# Patient Record
Sex: FEMALE | Race: BLACK OR AFRICAN AMERICAN | NOT HISPANIC OR LATINO | Employment: PART TIME | URBAN - METROPOLITAN AREA
[De-identification: names, ages, dates, MRNs, and addresses within clinical notes are randomized per-mention and may not be internally consistent; named-entity substitution may affect disease eponyms.]

---

## 2020-03-25 ENCOUNTER — HOSPITAL ENCOUNTER (EMERGENCY)
Facility: HOSPITAL | Age: 35
Discharge: HOME | End: 2020-03-26
Attending: EMERGENCY MEDICINE
Payer: MEDICAID

## 2020-03-25 DIAGNOSIS — R06.02 SHORTNESS OF BREATH: ICD-10-CM

## 2020-03-25 DIAGNOSIS — J40 BRONCHITIS: Primary | ICD-10-CM

## 2020-03-25 LAB
B-HCG UR QL: NEGATIVE
CTP QC/QA: YES

## 2020-03-25 PROCEDURE — 87502 INFLUENZA DNA AMP PROBE: CPT

## 2020-03-25 PROCEDURE — 81025 URINE PREGNANCY TEST: CPT | Performed by: EMERGENCY MEDICINE

## 2020-03-25 PROCEDURE — U0002 COVID-19 LAB TEST NON-CDC: HCPCS

## 2020-03-25 PROCEDURE — 93005 ELECTROCARDIOGRAM TRACING: CPT

## 2020-03-25 PROCEDURE — 99283 EMERGENCY DEPT VISIT LOW MDM: CPT | Mod: 25

## 2020-03-26 VITALS
HEIGHT: 69 IN | OXYGEN SATURATION: 100 % | WEIGHT: 240 LBS | RESPIRATION RATE: 20 BRPM | DIASTOLIC BLOOD PRESSURE: 72 MMHG | HEART RATE: 107 BPM | BODY MASS INDEX: 35.55 KG/M2 | SYSTOLIC BLOOD PRESSURE: 123 MMHG | TEMPERATURE: 99 F

## 2020-03-26 LAB
ALBUMIN SERPL BCP-MCNC: 3.6 G/DL (ref 3.5–5.2)
ALP SERPL-CCNC: 69 U/L (ref 55–135)
ALT SERPL W/O P-5'-P-CCNC: 18 U/L (ref 10–44)
ANION GAP SERPL CALC-SCNC: 13 MMOL/L (ref 8–16)
AST SERPL-CCNC: 20 U/L (ref 10–40)
BASOPHILS # BLD AUTO: 0.04 K/UL (ref 0–0.2)
BASOPHILS NFR BLD: 1 % (ref 0–1.9)
BILIRUB SERPL-MCNC: 0.3 MG/DL (ref 0.1–1)
BNP SERPL-MCNC: <10 PG/ML (ref 0–99)
BUN SERPL-MCNC: 6 MG/DL (ref 6–20)
CALCIUM SERPL-MCNC: 9.1 MG/DL (ref 8.7–10.5)
CHLORIDE SERPL-SCNC: 107 MMOL/L (ref 95–110)
CO2 SERPL-SCNC: 20 MMOL/L (ref 23–29)
CREAT SERPL-MCNC: 0.8 MG/DL (ref 0.5–1.4)
CTP QC/QA: YES
D DIMER PPP IA.FEU-MCNC: 0.31 MG/L FEU
DIFFERENTIAL METHOD: ABNORMAL
EOSINOPHIL # BLD AUTO: 0.2 K/UL (ref 0–0.5)
EOSINOPHIL NFR BLD: 3.8 % (ref 0–8)
ERYTHROCYTE [DISTWIDTH] IN BLOOD BY AUTOMATED COUNT: 13.4 % (ref 11.5–14.5)
EST. GFR  (AFRICAN AMERICAN): >60 ML/MIN/1.73 M^2
EST. GFR  (NON AFRICAN AMERICAN): >60 ML/MIN/1.73 M^2
GLUCOSE SERPL-MCNC: 98 MG/DL (ref 70–110)
HCT VFR BLD AUTO: 42.1 % (ref 37–48.5)
HGB BLD-MCNC: 13.5 G/DL (ref 12–16)
IMM GRANULOCYTES # BLD AUTO: 0.01 K/UL (ref 0–0.04)
IMM GRANULOCYTES NFR BLD AUTO: 0.2 % (ref 0–0.5)
LYMPHOCYTES # BLD AUTO: 2 K/UL (ref 1–4.8)
LYMPHOCYTES NFR BLD: 47.6 % (ref 18–48)
MCH RBC QN AUTO: 24.6 PG (ref 27–31)
MCHC RBC AUTO-ENTMCNC: 32.1 G/DL (ref 32–36)
MCV RBC AUTO: 77 FL (ref 82–98)
MONOCYTES # BLD AUTO: 0.5 K/UL (ref 0.3–1)
MONOCYTES NFR BLD: 12.7 % (ref 4–15)
NEUTROPHILS # BLD AUTO: 1.4 K/UL (ref 1.8–7.7)
NEUTROPHILS NFR BLD: 34.7 % (ref 38–73)
NRBC BLD-RTO: 0 /100 WBC
PLATELET # BLD AUTO: 228 K/UL (ref 150–350)
PMV BLD AUTO: 10.1 FL (ref 9.2–12.9)
POC MOLECULAR INFLUENZA A AGN: NEGATIVE
POC MOLECULAR INFLUENZA B AGN: NEGATIVE
POTASSIUM SERPL-SCNC: 4.2 MMOL/L (ref 3.5–5.1)
PROT SERPL-MCNC: 7.2 G/DL (ref 6–8.4)
RBC # BLD AUTO: 5.49 M/UL (ref 4–5.4)
SODIUM SERPL-SCNC: 140 MMOL/L (ref 136–145)
TROPONIN I SERPL DL<=0.01 NG/ML-MCNC: <0.006 NG/ML (ref 0–0.03)
WBC # BLD AUTO: 4.16 K/UL (ref 3.9–12.7)

## 2020-03-26 PROCEDURE — 94761 N-INVAS EAR/PLS OXIMETRY MLT: CPT

## 2020-03-26 PROCEDURE — 25000242 PHARM REV CODE 250 ALT 637 W/ HCPCS: Performed by: EMERGENCY MEDICINE

## 2020-03-26 PROCEDURE — 85379 FIBRIN DEGRADATION QUANT: CPT

## 2020-03-26 PROCEDURE — 94640 AIRWAY INHALATION TREATMENT: CPT

## 2020-03-26 PROCEDURE — 80053 COMPREHEN METABOLIC PANEL: CPT

## 2020-03-26 PROCEDURE — 83880 ASSAY OF NATRIURETIC PEPTIDE: CPT

## 2020-03-26 PROCEDURE — 85025 COMPLETE CBC W/AUTO DIFF WBC: CPT

## 2020-03-26 PROCEDURE — 84484 ASSAY OF TROPONIN QUANT: CPT

## 2020-03-26 RX ORDER — ALBUTEROL SULFATE 90 UG/1
1-2 AEROSOL, METERED RESPIRATORY (INHALATION) EVERY 6 HOURS PRN
Qty: 8 G | Refills: 0 | Status: SHIPPED | OUTPATIENT
Start: 2020-03-26 | End: 2020-12-03

## 2020-03-26 RX ORDER — IPRATROPIUM BROMIDE AND ALBUTEROL SULFATE 2.5; .5 MG/3ML; MG/3ML
3 SOLUTION RESPIRATORY (INHALATION)
Status: COMPLETED | OUTPATIENT
Start: 2020-03-26 | End: 2020-03-26

## 2020-03-26 RX ADMIN — IPRATROPIUM BROMIDE AND ALBUTEROL SULFATE 3 ML: .5; 3 SOLUTION RESPIRATORY (INHALATION) at 12:03

## 2020-03-26 NOTE — ED TRIAGE NOTES
Pt presents to ED via POV with c/o shortness of breath and cough, both which started yesterday. Pt reports a productive cough with green sputum. She also c/o midsternal chest tightness with the cough. Denies nausea but had an episode of emesis with her cough earlier today. She has been drinking hot tea with lemon and peppermint without relief in symptoms as well as taking tylenol. Denies any sick contacts. Denies fevers. Denies respiratory history but reports hx of CHF though is not currently on diuretics. Pt breathless during HPI and intake.

## 2020-03-26 NOTE — ED NOTES
Pt states she feels much better after breathing treatment. Pt now speaking in full sentences without difficulty or shortness of breath. Respirations even and unlabored.

## 2020-03-26 NOTE — ED PROVIDER NOTES
Encounter Date: 3/25/2020       History     Chief Complaint   Patient presents with    Shortness of Breath     Pt here with reports of SOB, cough and wheezing since yesterday, denies fever. Pt only able to talk in short sentences, unable to ambulate.    Cough     35-year-old female presents with cough, shortness of breath.  She states her symptoms started yesterday.  She noticed some wheezing.  She states she is becoming short of breath with going up stairs.  She has not had a fever, myalgias.  She states her cough was productive of light colored sputum.  She denies any lower extremity edema.  She denies previous history of DVT or PE.  She does report history of congestive heart failure several years ago, she is unsure why she was diagnosed with this.  She states she has not been on any diuretics for the past several years.  She has no other complaints.        Review of patient's allergies indicates:  No Known Allergies  History reviewed. No pertinent past medical history.  Past Surgical History:   Procedure Laterality Date     SECTION, CLASSIC      CHOLECYSTECTOMY      TUBAL LIGATION       History reviewed. No pertinent family history.  Social History     Tobacco Use    Smoking status: Never Smoker   Substance Use Topics    Alcohol use: Yes    Drug use: No     Review of Systems   Constitutional: Negative for chills and fever.   HENT: Negative for congestion and sore throat.    Eyes: Negative for visual disturbance.   Respiratory: Positive for cough and shortness of breath.    Cardiovascular: Negative for chest pain and leg swelling.   Gastrointestinal: Negative for abdominal pain, nausea and vomiting.   Genitourinary: Negative for dysuria and vaginal discharge.   Skin: Negative for rash.   Neurological: Negative for headaches.   Psychiatric/Behavioral: Negative for decreased concentration.       Physical Exam     Initial Vitals [20 2314]   BP Pulse Resp Temp SpO2   (!) 132/90 96 18 98.6 °F (37  °C) 98 %      MAP       --         Physical Exam    Nursing note and vitals reviewed.  Constitutional: She appears well-developed and well-nourished. She is not diaphoretic. No distress.   HENT:   Mouth/Throat: Oropharynx is clear and moist.   Eyes: Pupils are equal, round, and reactive to light.   Neck: Neck supple.   Cardiovascular: Normal rate and regular rhythm.   Pulmonary/Chest: Breath sounds normal. No respiratory distress. She has no wheezes. She has no rhonchi. She has no rales.   Patient does have slight tachypnea after giving her history.   Abdominal: Soft. There is no tenderness.   Musculoskeletal: She exhibits no edema.   No lower extremity edema, no posterior calf tenderness.   Neurological: She is alert and oriented to person, place, and time.   Skin: Skin is warm and dry.   Psychiatric: She has a normal mood and affect.         ED Course   Procedures  Labs Reviewed   CBC W/ AUTO DIFFERENTIAL - Abnormal; Notable for the following components:       Result Value    RBC 5.49 (*)     Mean Corpuscular Volume 77 (*)     Mean Corpuscular Hemoglobin 24.6 (*)     Gran # (ANC) 1.4 (*)     Gran% 34.7 (*)     All other components within normal limits   COMPREHENSIVE METABOLIC PANEL - Abnormal; Notable for the following components:    CO2 20 (*)     All other components within normal limits   B-TYPE NATRIURETIC PEPTIDE   D DIMER, QUANTITATIVE   TROPONIN I   SARS-COV-2 (COVID-19) QUALITATIVE PCR   POCT URINE PREGNANCY   POCT INFLUENZA A/B MOLECULAR     EKG Readings: (Independently Interpreted)   Normal sinus rhythm, rate 98 beats per minute, normal OK interval,  milliseconds.  No STEMI, no malignant dysrhythmia       Imaging Results          X-Ray Chest AP Portable (Final result)  Result time 03/26/20 00:19:36    Final result by Landon Hanna MD (03/26/20 00:19:36)                 Impression:      No acute cardiopulmonary process identified.      Electronically signed by: Landon Hanna  MD  Date:    03/26/2020  Time:    00:19             Narrative:    EXAMINATION:  XR CHEST AP PORTABLE    CLINICAL HISTORY:  shortness of breath;    TECHNIQUE:  Single frontal view of the chest was performed.    COMPARISON:  June 2016.    FINDINGS:  Cardiac silhouette is normal in size.  Lungs are symmetrically expanded.  No evidence of focal consolidative process, pneumothorax, or significant effusion.  No acute osseous abnormality identified.                                 Medical Decision Making:   Initial Assessment:   35-year-old female presenting with shortness of breath, cough.  On exam, she does have slight tachypnea when giving history, but otherwise appears well and not in distress.  I do not appreciate any wheezing or rales.  Given her history congestive heart failure, differential includes CHF exacerbation, other considerations PE, ACS, pneumonia, bronchitis.  Workup initiated with basic labs including cardiac markers, EKG, chest x-ray.  Although I do not hear any wheezing on her exam, will try DuoNeb to see if she has any symptomatic improvement.  ED Management:  Patient's labs reviewed, cardiac markers are negative, D-dimer negative.  No anemia.  No leukocytosis.  Her chest x-ray is nonfocal.  On reassessment, she reports feeling improved after DuoNeb.  I suspect bronchitis.  She is afebrile.  I will prescribe an albuterol inhaler to use if needed over the next several days, I reviewed strict return precautions including any worsening breathing or development of new or worsening symptoms.  Patient states she understands and agrees with plan.    Additional MDM:   PERC Rule:   Age is greater than or equal to 50 = 0.0  Heart Rate is greater than or equal to 100 = 0.0  SaO2 on room air < 95% = 0.0  Unilateral leg swelling = 0.0  Hemoptysis = 0.0  Recent surgery or trauma = 0.0  Prior PE or DVT =  0.0  Hormone use = 0.00  PERC Score = 0    Well's Criteria Score:  -Clinical symptoms of DVT (leg swelling, pain  with palpation) = 0.0  -Other diagnosis less likely than pulmonary embolism =            3.0  -Heart Rate >100 =   0.0  -Immobilization (= or > than 3 days) or surgery in the previous 4 weeks = 0.0  -Previous DVT/PE = 0.0  -Hemoptysis =          0.0  -Malignancy =           0.0  Well's Probability Score =    3                                    Clinical Impression:       ICD-10-CM ICD-9-CM   1. Bronchitis J40 490   2. Shortness of breath R06.02 786.05     This dictation has been generated using M-Modal Fluency Direct dictation; some phonetic errors may occur.           ED Disposition Condition    Discharge Stable        ED Prescriptions     Medication Sig Dispense Start Date End Date Auth. Provider    albuterol (PROVENTIL/VENTOLIN HFA) 90 mcg/actuation inhaler Inhale 1-2 puffs into the lungs every 6 (six) hours as needed for Wheezing or Shortness of Breath. Rescue 8 g 3/26/2020 3/26/2021 Annabelle Edwards MD        Follow-up Information     Follow up With Specialties Details Why Contact Info    Harsh Palmer MD Internal Medicine, Family Medicine Schedule an appointment as soon as possible for a visit on 3/30/2020 To recheck your symptoms (for virtual or phone visit if possible) [address]  [address]  [place] [address]  [phone]      [facility]  As needed, If symptoms worsen [address]  [facility] [address] [phone]                                     Annabelle Edwards MD  03/26/20 0100

## 2020-03-27 LAB — SARS-COV-2 RNA RESP QL NAA+PROBE: NOT DETECTED

## 2020-09-09 ENCOUNTER — HOSPITAL ENCOUNTER (EMERGENCY)
Facility: HOSPITAL | Age: 35
Discharge: HOME | End: 2020-09-09
Attending: EMERGENCY MEDICINE
Payer: MEDICAID

## 2020-09-09 VITALS
BODY MASS INDEX: 37.03 KG/M2 | HEART RATE: 89 BPM | OXYGEN SATURATION: 100 % | TEMPERATURE: 98 F | WEIGHT: 250 LBS | DIASTOLIC BLOOD PRESSURE: 71 MMHG | HEIGHT: 69 IN | RESPIRATION RATE: 18 BRPM | SYSTOLIC BLOOD PRESSURE: 128 MMHG

## 2020-09-09 DIAGNOSIS — R60.0 PERIPHERAL EDEMA: ICD-10-CM

## 2020-09-09 DIAGNOSIS — R07.9 CHEST PAIN: ICD-10-CM

## 2020-09-09 DIAGNOSIS — K21.9 GASTROESOPHAGEAL REFLUX DISEASE, ESOPHAGITIS PRESENCE NOT SPECIFIED: Primary | ICD-10-CM

## 2020-09-09 LAB
ALBUMIN SERPL BCP-MCNC: 3.8 G/DL (ref 3.5–5.2)
ALP SERPL-CCNC: 63 U/L (ref 55–135)
ALT SERPL W/O P-5'-P-CCNC: 16 U/L (ref 10–44)
ANION GAP SERPL CALC-SCNC: 11 MMOL/L (ref 8–16)
AST SERPL-CCNC: 16 U/L (ref 10–40)
B-HCG UR QL: NEGATIVE
BACTERIA #/AREA URNS HPF: ABNORMAL /HPF
BASOPHILS # BLD AUTO: 0.05 K/UL (ref 0–0.2)
BASOPHILS NFR BLD: 0.8 % (ref 0–1.9)
BILIRUB SERPL-MCNC: 0.3 MG/DL (ref 0.1–1)
BILIRUB UR QL STRIP: NEGATIVE
BNP SERPL-MCNC: 20 PG/ML (ref 0–99)
BUN SERPL-MCNC: 11 MG/DL (ref 6–20)
CALCIUM SERPL-MCNC: 9.3 MG/DL (ref 8.7–10.5)
CHLORIDE SERPL-SCNC: 105 MMOL/L (ref 95–110)
CLARITY UR: CLEAR
CO2 SERPL-SCNC: 23 MMOL/L (ref 23–29)
COLOR UR: ABNORMAL
CREAT SERPL-MCNC: 0.9 MG/DL (ref 0.5–1.4)
CTP QC/QA: YES
DIFFERENTIAL METHOD: ABNORMAL
EOSINOPHIL # BLD AUTO: 0.2 K/UL (ref 0–0.5)
EOSINOPHIL NFR BLD: 2.9 % (ref 0–8)
ERYTHROCYTE [DISTWIDTH] IN BLOOD BY AUTOMATED COUNT: 13.5 % (ref 11.5–14.5)
EST. GFR  (AFRICAN AMERICAN): >60 ML/MIN/1.73 M^2
EST. GFR  (NON AFRICAN AMERICAN): >60 ML/MIN/1.73 M^2
GLUCOSE SERPL-MCNC: 71 MG/DL (ref 70–110)
GLUCOSE UR QL STRIP: NEGATIVE
HCT VFR BLD AUTO: 38.5 % (ref 37–48.5)
HGB BLD-MCNC: 12.6 G/DL (ref 12–16)
HGB UR QL STRIP: ABNORMAL
IMM GRANULOCYTES # BLD AUTO: 0.01 K/UL (ref 0–0.04)
IMM GRANULOCYTES NFR BLD AUTO: 0.2 % (ref 0–0.5)
KETONES UR QL STRIP: NEGATIVE
LEUKOCYTE ESTERASE UR QL STRIP: NEGATIVE
LYMPHOCYTES # BLD AUTO: 2.8 K/UL (ref 1–4.8)
LYMPHOCYTES NFR BLD: 43.2 % (ref 18–48)
MAGNESIUM SERPL-MCNC: 2.2 MG/DL (ref 1.6–2.6)
MCH RBC QN AUTO: 25 PG (ref 27–31)
MCHC RBC AUTO-ENTMCNC: 32.7 G/DL (ref 32–36)
MCV RBC AUTO: 76 FL (ref 82–98)
MICROSCOPIC COMMENT: ABNORMAL
MONOCYTES # BLD AUTO: 0.5 K/UL (ref 0.3–1)
MONOCYTES NFR BLD: 7.4 % (ref 4–15)
NEUTROPHILS # BLD AUTO: 2.9 K/UL (ref 1.8–7.7)
NEUTROPHILS NFR BLD: 45.5 % (ref 38–73)
NITRITE UR QL STRIP: NEGATIVE
NRBC BLD-RTO: 0 /100 WBC
PH UR STRIP: 7 [PH] (ref 5–8)
PLATELET # BLD AUTO: 278 K/UL (ref 150–350)
PMV BLD AUTO: 10.2 FL (ref 9.2–12.9)
POTASSIUM SERPL-SCNC: 3.8 MMOL/L (ref 3.5–5.1)
PROT SERPL-MCNC: 7.2 G/DL (ref 6–8.4)
PROT UR QL STRIP: NEGATIVE
RBC # BLD AUTO: 5.04 M/UL (ref 4–5.4)
RBC #/AREA URNS HPF: 2 /HPF (ref 0–4)
SODIUM SERPL-SCNC: 139 MMOL/L (ref 136–145)
SP GR UR STRIP: 1 (ref 1–1.03)
SQUAMOUS #/AREA URNS HPF: 1 /HPF
TROPONIN I SERPL DL<=0.01 NG/ML-MCNC: <0.006 NG/ML (ref 0–0.03)
TSH SERPL DL<=0.005 MIU/L-ACNC: 0.83 UIU/ML (ref 0.4–4)
URN SPEC COLLECT METH UR: ABNORMAL
UROBILINOGEN UR STRIP-ACNC: NEGATIVE EU/DL
WBC # BLD AUTO: 6.46 K/UL (ref 3.9–12.7)

## 2020-09-09 PROCEDURE — 81025 URINE PREGNANCY TEST: CPT | Performed by: EMERGENCY MEDICINE

## 2020-09-09 PROCEDURE — 93010 EKG 12-LEAD: ICD-10-PCS | Mod: ,,, | Performed by: INTERNAL MEDICINE

## 2020-09-09 PROCEDURE — 83735 ASSAY OF MAGNESIUM: CPT

## 2020-09-09 PROCEDURE — 84484 ASSAY OF TROPONIN QUANT: CPT

## 2020-09-09 PROCEDURE — 99284 EMERGENCY DEPT VISIT MOD MDM: CPT | Mod: 25

## 2020-09-09 PROCEDURE — 93005 ELECTROCARDIOGRAM TRACING: CPT

## 2020-09-09 PROCEDURE — 83880 ASSAY OF NATRIURETIC PEPTIDE: CPT

## 2020-09-09 PROCEDURE — 80053 COMPREHEN METABOLIC PANEL: CPT

## 2020-09-09 PROCEDURE — 25000003 PHARM REV CODE 250: Performed by: EMERGENCY MEDICINE

## 2020-09-09 PROCEDURE — 85025 COMPLETE CBC W/AUTO DIFF WBC: CPT

## 2020-09-09 PROCEDURE — 93010 ELECTROCARDIOGRAM REPORT: CPT | Mod: ,,, | Performed by: INTERNAL MEDICINE

## 2020-09-09 PROCEDURE — 84443 ASSAY THYROID STIM HORMONE: CPT

## 2020-09-09 PROCEDURE — 81000 URINALYSIS NONAUTO W/SCOPE: CPT

## 2020-09-09 RX ORDER — HYDROCHLOROTHIAZIDE 25 MG/1
12.5 TABLET ORAL DAILY
Qty: 30 TABLET | Refills: 0 | Status: SHIPPED | OUTPATIENT
Start: 2020-09-09 | End: 2021-07-19

## 2020-09-09 RX ORDER — HYDROCHLOROTHIAZIDE 25 MG/1
25 TABLET ORAL
Status: COMPLETED | OUTPATIENT
Start: 2020-09-09 | End: 2020-09-09

## 2020-09-09 RX ADMIN — HYDROCHLOROTHIAZIDE 25 MG: 25 TABLET ORAL at 10:09

## 2020-09-09 RX ADMIN — LIDOCAINE HYDROCHLORIDE: 20 SOLUTION ORAL; TOPICAL at 10:09

## 2020-09-10 NOTE — ED PROVIDER NOTES
"Encounter Date: 2020    SCRIBE #1 NOTE: I, Dreauche Cheney, am scribing for, and in the presence of,  Jairo Gallego MD. I have scribed the following portions of the note - Other sections scribed: HPI, ROS, PE.       History     Chief Complaint   Patient presents with    Shortness of Breath     when lying flat, w/ swelling to the ankles     CC: Orthopnea; Chest pain    Patient is a 35 year old female who presents to the ED complaining of orthopnea that began 5 days ago. She reports associated swelling to bilateral ankles and feet since onset. She states she consumed water without improvement.  Admits to high salt diet.  Patient was previosuly taking diuretics. She is no longer taking this medication. Patient reports sleeping with her feet elevated above her hips, however, her swelling worsens throughout the night. She admits to consuming BBQ foods 4 days ago after swelling began. Patient also complains of constant, lower sternal pain that feels like a "pulling". Chest discomfort began 4 days ago. Pain worsens with eating. She admits to taking Motrin, Tylenol, Aleve without relief. Reports cough with clear phlegm. No rhinorrhea. No fever or chills. No known sick contact. Denies emesis, diarrhea, and any other myalgias. No other associated symptoms.  She has been evaluated for similar complaints in the past.  She had a normal echocardiogram in 2018.      The history is provided by the patient.     Review of patient's allergies indicates:  No Known Allergies  History reviewed. No pertinent past medical history.  Past Surgical History:   Procedure Laterality Date     SECTION, CLASSIC      CHOLECYSTECTOMY      TUBAL LIGATION       History reviewed. No pertinent family history.  Social History     Tobacco Use    Smoking status: Never Smoker   Substance Use Topics    Alcohol use: Yes    Drug use: No     Review of Systems   Constitutional: Negative for chills, diaphoresis and fever.   HENT: Negative for " congestion and sore throat.    Respiratory: Positive for cough and shortness of breath (orthopnea). Negative for wheezing and stridor.    Cardiovascular: Positive for chest pain and leg swelling.   Gastrointestinal: Negative for abdominal pain, diarrhea, nausea and vomiting.        No melena.   Genitourinary: Negative for dysuria, flank pain and hematuria.   Musculoskeletal: Positive for joint swelling. Negative for myalgias.   Skin: Negative for rash.   Neurological: Positive for headaches. Negative for dizziness, speech difficulty, weakness, light-headedness and numbness.   Psychiatric/Behavioral: Negative for confusion.   All other systems reviewed and are negative.      Physical Exam     Initial Vitals [09/09/20 2020]   BP Pulse Resp Temp SpO2   136/84 91 16 98.2 °F (36.8 °C) 99 %      MAP       --         Physical Exam    Nursing note and vitals reviewed.  Constitutional: She appears well-developed and well-nourished. She is not diaphoretic. No distress.   HENT:   Head: Normocephalic and atraumatic.   Nose: Nose normal.   Mouth/Throat: Oropharynx is clear and moist.   Eyes: Conjunctivae and EOM are normal. Pupils are equal, round, and reactive to light. Right eye exhibits no discharge. Left eye exhibits no discharge. No scleral icterus.   Neck: Normal range of motion. Neck supple. No tracheal deviation present. No JVD present.   Cardiovascular: Normal rate, regular rhythm and normal heart sounds.   No murmur heard.  Pulmonary/Chest: Breath sounds normal. No stridor. No respiratory distress. She has no wheezes. She has no rhonchi. She has no rales. She exhibits no tenderness.   Abdominal: Soft. She exhibits no distension. There is no abdominal tenderness. There is no rebound and no guarding.   Musculoskeletal: Normal range of motion. Edema present. No tenderness.      Comments: 1+ non-pitting of bilateral feet and ankles.   Neurological: She is alert and oriented to person, place, and time. She has normal  strength. No cranial nerve deficit. GCS score is 15. GCS eye subscore is 4. GCS verbal subscore is 5. GCS motor subscore is 6.   Skin: Skin is warm and dry. No rash noted. No erythema.   Psychiatric: She has a normal mood and affect. Her behavior is normal. Judgment and thought content normal.         ED Course   Procedures  Labs Reviewed   CBC W/ AUTO DIFFERENTIAL - Abnormal; Notable for the following components:       Result Value    Mean Corpuscular Volume 76 (*)     Mean Corpuscular Hemoglobin 25.0 (*)     All other components within normal limits   COMPREHENSIVE METABOLIC PANEL   MAGNESIUM   URINALYSIS, REFLEX TO URINE CULTURE   TSH   B-TYPE NATRIURETIC PEPTIDE   TROPONIN I   URINALYSIS MICROSCOPIC   POCT URINE PREGNANCY     EKG Readings: (Independently Interpreted)   Initial Reading: No STEMI. Rhythm: Normal Sinus Rhythm. Heart Rate: 90. Ectopy: No Ectopy. Conduction: Normal. ST Segments: Non-Specific ST Segment Depression. T Waves: Normal. Axis: Normal.   No acute ischemic changes.       Imaging Results    None          Medical Decision Making:   History:   Old Medical Records: I decided to obtain old medical records.  Initial Assessment:   Patient presents for evaluation of peripheral edema and orthopnea.  She also complains of lower left chest discomfort associated with eating.  Normal pulmonary exam.  Benign abdominal exam.  EKG does not show ischemic changes.  Blood pressure is not markedly elevated.  Oxygen saturations normal.  Patient respiratory distress.  Will check basic lab work with BNP and urinalysis.  Suspect benign etiology.  Differential Diagnosis:   Peripheral edema, venous insufficiency, GERD, CHF, renal failure  Independently Interpreted Test(s):   I have ordered and independently interpreted EKG Reading(s) - see prior notes  Clinical Tests:   Lab Tests: Ordered and Reviewed  The following lab test(s) were unremarkable: CBC, CMP, Urinalysis, UPT, Troponin and BNP  Medical Tests: Ordered and  Reviewed  ED Management:  2210:  No significant lab abnormalities.  Chest x-ray unremarkable.  EKG unremarkable.  No evidence of ACS.  Will place on diuretic for benign peripheral edema.  No evidence of renal failure or CHF..  Patient may follow up with primary care as needed.  Patient may take over-the-counter antacids for GERD symptoms.            Scribe Attestation:   Scribe #1: I performed the above scribed service and the documentation accurately describes the services I performed. I attest to the accuracy of the note.                      Clinical Impression:       ICD-10-CM ICD-9-CM   1. Gastroesophageal reflux disease, esophagitis presence not specified  K21.9 530.81   2. Chest pain  R07.9 786.50   3. Peripheral edema  R60.9 782.3         Disposition:   Disposition: Discharged  Condition: Stable               I, Jairo Gallego MD, personally performed the services described in this documentation. All medical record entries made by the scribe were at my direction and in my presence.  I have reviewed the chart and agree that the record reflects my personal performance and is accurate and complete.             Jairo Gallego MD  09/09/20 2211

## 2020-09-10 NOTE — ED NOTES
No s/s of distress. Pain at a 0 after taking meds. VSS. IV D/C'd without difficulty. Family at bedside. Verbalizes understanding of meds/instructions/RX.

## 2020-09-10 NOTE — ED NOTES
In POV. A/O X4. Ambulatory to room. VSS. No S/S of distress.     C/O emanuel ankle swelling with SOB when lying flat for 3 days. Pt notes having to lay on 6 pillows at night to sleep. Admits to some substernal Chest pain for 3 days. Denies N/V.     Hooked to monitor. VSS. Will continue to closely monitor.

## 2020-12-03 ENCOUNTER — HOSPITAL ENCOUNTER (EMERGENCY)
Facility: HOSPITAL | Age: 35
Discharge: HOME | End: 2020-12-03
Attending: EMERGENCY MEDICINE
Payer: MEDICAID

## 2020-12-03 VITALS
WEIGHT: 250 LBS | DIASTOLIC BLOOD PRESSURE: 65 MMHG | HEART RATE: 89 BPM | BODY MASS INDEX: 37.03 KG/M2 | HEIGHT: 69 IN | TEMPERATURE: 99 F | OXYGEN SATURATION: 98 % | SYSTOLIC BLOOD PRESSURE: 116 MMHG | RESPIRATION RATE: 18 BRPM

## 2020-12-03 DIAGNOSIS — M54.50 ACUTE BILATERAL LOW BACK PAIN WITHOUT SCIATICA: ICD-10-CM

## 2020-12-03 DIAGNOSIS — R42 DIZZINESS: ICD-10-CM

## 2020-12-03 DIAGNOSIS — U07.1 COVID-19 VIRUS INFECTION: Primary | ICD-10-CM

## 2020-12-03 DIAGNOSIS — R51.9 ACUTE NONINTRACTABLE HEADACHE, UNSPECIFIED HEADACHE TYPE: ICD-10-CM

## 2020-12-03 DIAGNOSIS — R05.9 COUGH: ICD-10-CM

## 2020-12-03 LAB
B-HCG UR QL: NEGATIVE
BILIRUB UR QL STRIP: NEGATIVE
CLARITY UR: ABNORMAL
COLOR UR: YELLOW
CTP QC/QA: YES
CTP QC/QA: YES
GLUCOSE UR QL STRIP: NEGATIVE
HGB UR QL STRIP: NEGATIVE
KETONES UR QL STRIP: NEGATIVE
LEUKOCYTE ESTERASE UR QL STRIP: NEGATIVE
MICROSCOPIC COMMENT: NORMAL
NITRITE UR QL STRIP: NEGATIVE
PH UR STRIP: 7 [PH] (ref 5–8)
PROT UR QL STRIP: NEGATIVE
RBC #/AREA URNS HPF: 1 /HPF (ref 0–4)
SARS-COV-2 RDRP RESP QL NAA+PROBE: POSITIVE
SP GR UR STRIP: 1.02 (ref 1–1.03)
SQUAMOUS #/AREA URNS HPF: NORMAL /HPF
URN SPEC COLLECT METH UR: ABNORMAL
UROBILINOGEN UR STRIP-ACNC: ABNORMAL EU/DL
WBC #/AREA URNS HPF: 1 /HPF (ref 0–5)

## 2020-12-03 PROCEDURE — 96372 THER/PROPH/DIAG INJ SC/IM: CPT

## 2020-12-03 PROCEDURE — 25000003 PHARM REV CODE 250: Performed by: PHYSICIAN ASSISTANT

## 2020-12-03 PROCEDURE — 81025 URINE PREGNANCY TEST: CPT | Performed by: PHYSICIAN ASSISTANT

## 2020-12-03 PROCEDURE — 99284 EMERGENCY DEPT VISIT MOD MDM: CPT | Mod: 25

## 2020-12-03 PROCEDURE — 63600175 PHARM REV CODE 636 W HCPCS: Performed by: PHYSICIAN ASSISTANT

## 2020-12-03 PROCEDURE — U0002 COVID-19 LAB TEST NON-CDC: HCPCS | Performed by: PHYSICIAN ASSISTANT

## 2020-12-03 PROCEDURE — 81000 URINALYSIS NONAUTO W/SCOPE: CPT

## 2020-12-03 RX ORDER — L. ACIDOPHILUS/L.BULGARICUS 100MM CELL
1 GRANULES IN PACKET (EA) ORAL 2 TIMES DAILY
COMMUNITY
End: 2021-07-19

## 2020-12-03 RX ORDER — CETIRIZINE HYDROCHLORIDE 10 MG/1
10 TABLET ORAL DAILY
Qty: 30 TABLET | Refills: 0 | Status: SHIPPED | OUTPATIENT
Start: 2020-12-03 | End: 2022-04-19

## 2020-12-03 RX ORDER — LIDOCAINE 50 MG/G
1 PATCH TOPICAL DAILY
Qty: 15 PATCH | Refills: 0 | Status: ON HOLD | OUTPATIENT
Start: 2020-12-03 | End: 2020-12-16 | Stop reason: SDUPTHER

## 2020-12-03 RX ORDER — ITRACONAZOLE 100 MG/1
200 CAPSULE ORAL DAILY
COMMUNITY
End: 2021-07-19

## 2020-12-03 RX ORDER — CHOLECALCIFEROL (VITAMIN D3) 25 MCG
1000 TABLET ORAL DAILY
COMMUNITY
End: 2021-07-19

## 2020-12-03 RX ORDER — BENZONATATE 100 MG/1
100 CAPSULE ORAL 3 TIMES DAILY PRN
Qty: 20 CAPSULE | Refills: 0 | Status: ON HOLD | OUTPATIENT
Start: 2020-12-03 | End: 2020-12-16

## 2020-12-03 RX ORDER — VITAMIN A 3000 MCG
10000 CAPSULE ORAL DAILY
Status: ON HOLD | COMMUNITY
End: 2020-12-16 | Stop reason: HOSPADM

## 2020-12-03 RX ORDER — KETOROLAC TROMETHAMINE 30 MG/ML
30 INJECTION, SOLUTION INTRAMUSCULAR; INTRAVENOUS
Status: COMPLETED | OUTPATIENT
Start: 2020-12-03 | End: 2020-12-03

## 2020-12-03 RX ORDER — DEXAMETHASONE SODIUM PHOSPHATE 4 MG/ML
8 INJECTION, SOLUTION INTRA-ARTICULAR; INTRALESIONAL; INTRAMUSCULAR; INTRAVENOUS; SOFT TISSUE
Status: COMPLETED | OUTPATIENT
Start: 2020-12-03 | End: 2020-12-03

## 2020-12-03 RX ORDER — LIDOCAINE 50 MG/G
1 PATCH TOPICAL
Status: DISCONTINUED | OUTPATIENT
Start: 2020-12-03 | End: 2020-12-03 | Stop reason: HOSPADM

## 2020-12-03 RX ORDER — IBUPROFEN 100 MG/5ML
1000 SUSPENSION, ORAL (FINAL DOSE FORM) ORAL DAILY
COMMUNITY
End: 2021-07-19

## 2020-12-03 RX ORDER — ALBUTEROL SULFATE 90 UG/1
1-2 AEROSOL, METERED RESPIRATORY (INHALATION) EVERY 6 HOURS PRN
Qty: 8 G | Refills: 0 | Status: SHIPPED | OUTPATIENT
Start: 2020-12-03 | End: 2021-07-19

## 2020-12-03 RX ADMIN — KETOROLAC TROMETHAMINE 30 MG: 30 INJECTION, SOLUTION INTRAMUSCULAR at 02:12

## 2020-12-03 RX ADMIN — DEXAMETHASONE SODIUM PHOSPHATE 8 MG: 4 INJECTION, SOLUTION INTRAMUSCULAR; INTRAVENOUS at 02:12

## 2020-12-03 RX ADMIN — LIDOCAINE 1 PATCH: 50 PATCH TOPICAL at 02:12

## 2020-12-03 NOTE — ED PROVIDER NOTES
Encounter Date: 12/3/2020    SCRIBE #1 NOTE: I, Drea Cheney, am scribing for, and in the presence of,  Cesar Greenwood PA-C. I have scribed the following portions of the note - Other sections scribed: HPI, ROS, PE.       History     Chief Complaint   Patient presents with    COVID-19 Concerns      POSITIVE on TUESDAY     Cough     started yesterday    Back Pain    Dizziness    Headache     CC: COVID-19 concerns; Back pain    35-year-old female presents to the ED complaining of COVID-19 concerns. She reports cough and HA since yesterday. Reports sick contact with  who tested positive for COVID-19 on 2020. Pt also reports fall in bathtub 1 month ago. She reports back pain since fall, that has Increased over the last few days. She is unsure whether the back pain is due to fall or COVID-19.  She denies fever, sore throat, abdominal pain, CP, and n/v/d. She reports allergy to Penicillins. Pt takes HCTZ. She is currently taking an antifungal for toe fungal infection.     The history is provided by the patient.     Review of patient's allergies indicates:   Allergen Reactions    Penicillins      No past medical history on file.  Past Surgical History:   Procedure Laterality Date     SECTION, CLASSIC      CHOLECYSTECTOMY      TUBAL LIGATION       No family history on file.  Social History     Tobacco Use    Smoking status: Never Smoker   Substance Use Topics    Alcohol use: Yes    Drug use: No     Review of Systems   Constitutional: Negative for fever.   HENT: Negative for sore throat.    Eyes: Negative for visual disturbance.   Respiratory: Positive for cough. Negative for shortness of breath.    Cardiovascular: Negative for chest pain.   Gastrointestinal: Negative for abdominal pain, diarrhea, nausea and vomiting.   Genitourinary: Negative for dysuria.   Musculoskeletal: Positive for back pain.   Skin: Negative for rash.   Neurological: Positive for headaches.       Physical Exam      Initial Vitals [12/03/20 1329]   BP Pulse Resp Temp SpO2   130/82 105 20 98.8 °F (37.1 °C) 99 %      MAP       --         Physical Exam    Nursing note and vitals reviewed.  Constitutional: She appears well-developed and well-nourished. She is cooperative.  Non-toxic appearance. No distress.   HENT:   Head: Normocephalic and atraumatic.   Right Ear: Tympanic membrane normal.   Left Ear: Tympanic membrane normal.   Nose: Nose normal.   Mouth/Throat: Uvula is midline, oropharynx is clear and moist and mucous membranes are normal.   Eyes: EOM are normal. Pupils are equal, round, and reactive to light.   Neck: Normal range of motion. Neck supple.   Cardiovascular: Normal rate, regular rhythm and normal heart sounds.   Pulmonary/Chest: Effort normal and breath sounds normal. No accessory muscle usage. No tachypnea. No respiratory distress.   Abdominal: Soft. There is no abdominal tenderness. There is no rigidity, no rebound, no guarding and no CVA tenderness.   Musculoskeletal: Normal range of motion. Tenderness present. No edema.      Lumbar back: She exhibits tenderness and pain. She exhibits no bony tenderness and no deformity.        Back:       Comments: Bilateral lumbar tenderness to palpation.    Neurological: She is alert and oriented to person, place, and time. She has normal strength. No cranial nerve deficit or sensory deficit. Coordination and gait normal.   Skin: Skin is warm, dry and intact. Capillary refill takes less than 2 seconds. No rash noted.         ED Course   Procedures  Labs Reviewed   URINALYSIS, REFLEX TO URINE CULTURE - Abnormal; Notable for the following components:       Result Value    Appearance, UA Hazy (*)     Urobilinogen, UA 2.0-3.0 (*)     All other components within normal limits    Narrative:     Specimen Source->Urine   SARS-COV-2 RDRP GENE - Abnormal; Notable for the following components:    POC Rapid COVID Positive (*)     All other components within normal limits    URINALYSIS MICROSCOPIC    Narrative:     Specimen Source->Urine   POCT URINE PREGNANCY          Imaging Results          X-Ray Chest AP Portable (Final result)  Result time 12/03/20 15:15:18    Final result by Saad Light MD (12/03/20 15:15:18)                 Impression:      No convincing radiographic evidence of pneumonia or other source of cough/shortness of breath on this limited single view, noting that early/mild viral pneumonia may be radiographically occult.      Electronically signed by: Saad Light MD  Date:    12/03/2020  Time:    15:15             Narrative:    EXAMINATION:  XR CHEST AP PORTABLE    CLINICAL HISTORY:  covid positive;    TECHNIQUE:  Single frontal view of the chest was performed.    COMPARISON:  Chest radiograph 03/26/2020    FINDINGS:  Resolution is somewhat limited by body habitus with underpenetration.  No detrimental change.The lungs are clear, with normal appearance of pulmonary vasculature and no pleural effusion or pneumothorax.    The cardiac silhouette is normal in size. The hilar and mediastinal contours are unremarkable.    Bones are intact.                               X-Ray Lumbar Spine Ap And Lateral (Final result)  Result time 12/03/20 15:18:46    Final result by Saad Light MD (12/03/20 15:18:46)                 Impression:      No displaced fracture-dislocation identified.      Electronically signed by: Saad Light MD  Date:    12/03/2020  Time:    15:18             Narrative:    EXAMINATION:  XR LUMBAR SPINE AP AND LATERAL    CLINICAL HISTORY:  Back pain or radiculopathy, trauma;    TECHNIQUE:  AP, lateral and spot images were performed of the lumbar spine.    COMPARISON:  Chest radiograph same day and 06/16/2020; CT renal stone study 10/22/2020    FINDINGS:  Cholecystectomy clips noted.  Bones are well mineralized.  5 non-rib-bearing lumbar type vertebral bodies.  Slight dextrocurvature likely related to positioning or muscle strain.  Lumbar lordosis is  maintained.  No displaced fracture, dislocation or significant listhesis.  Vertebral body and intervertebral disc space heights appear relatively maintained.  Minimal degenerative change.  No subcutaneous emphysema.                                 Medical Decision Making:   Clinical Tests:   Lab Tests: Ordered and Reviewed  Radiological Study: Ordered and Reviewed  ED Management:   Hemodynamically stable.  Nontoxic in no acute distress.  Patient is overall well-appearing, pleasant, conversational.  COVID positive.  Chest x-ray without acute process.  Patient maintaining  Oxygen saturations at rest and while ambulating without tachypnea or distress.  Will discharge home COVID discharge instructions and strict ED return precautions for any worsening or additional concerning symptoms.  Patient rolled in COVID home symptom monitoring program.  Patient verbalizes understanding and is agreeable with plan.            Scribe Attestation:   Scribe #1: I performed the above scribed service and the documentation accurately describes the services I performed. I attest to the accuracy of the note.                      Clinical Impression:     ICD-10-CM ICD-9-CM   1. COVID-19 virus infection  U07.1 079.89   2. Acute bilateral low back pain without sciatica  M54.5 724.2     338.19   3. Dizziness  R42 780.4   4. Acute nonintractable headache, unspecified headache type  R51.9 784.0   5. Cough  R05 786.2                      Disposition:   Disposition: Discharged  Condition: Stable     ED Disposition Condition    Discharge Stable        ED Prescriptions     Medication Sig Dispense Start Date End Date Auth. Provider    benzonatate (TESSALON) 100 MG capsule Take 1 capsule (100 mg total) by mouth 3 (three) times daily as needed. 20 capsule 12/3/2020 12/13/2020 Cesar Greenwood PA-C    lidocaine (LIDODERM) 5 % Place 1 patch onto the skin once daily. Remove & Discard patch within 12 hours or as directed by MD 15 patch 12/3/2020  Cesar  NATHALIA Pizano PA-C    cetirizine (ZYRTEC) 10 MG tablet Take 1 tablet (10 mg total) by mouth once daily. 30 tablet 12/3/2020 12/3/2021 Cesar Pizano PA-C    albuterol (PROVENTIL/VENTOLIN HFA) 90 mcg/actuation inhaler Inhale 1-2 puffs into the lungs every 6 (six) hours as needed for Wheezing. Rescue 8 g 12/3/2020 12/3/2021 Cesar Pizano PA-C        Follow-up Information     Follow up With Specialties Details Why Contact Info    [facility] Schedule an appointment as soon as possible for a visit   [facility] [address] [phone]      [facility] Go to  If symptoms worsen [address]  [facility] [address] [phone]                        I,   CESAR PIZANO, personally performed the services described in this documentation. All medical record entries made by the scribe were at my direction and in my presence.  I have reviewed the chart and agree that the record reflects my personal performance and is accurate and complete.                 Cesar Pizano PA-C  12/04/20 0015

## 2020-12-03 NOTE — DISCHARGE INSTRUCTIONS
Please take new medication as directed and follow discharge instructions provided.  Please make sure to follow-up with PCP to discuss today's emergency department visit and for further evaluation and management.  Please return emergency department immediately if her symptoms worsen or you develop any additional concerning symptoms.

## 2020-12-03 NOTE — Clinical Note
"Aniket "Turner Ac was seen and treated in our emergency department on 12/3/2020.     COVID-19 is present in our communities across the state. There is limited testing for COVID at this time, so not all patients can be tested. In this situation, your employee meets the following criteria:    Aniket Ac has met the criteria for COVID-19 testing and has a POSITIVE result. She can return to work once they are asymptomatic for 72 hours without the use of fever reducing medications AND at least ten days from the first positive result.     If you have any questions or concerns, or if I can be of further assistance, please do not hesitate to contact me.    Sincerely,             Cesar Greenwood PA-C"

## 2020-12-04 ENCOUNTER — NURSE TRIAGE (OUTPATIENT)
Dept: ADMINISTRATIVE | Facility: CLINIC | Age: 35
End: 2020-12-04

## 2020-12-04 NOTE — TELEPHONE ENCOUNTER
PCP is not in the system.  No message sent.    Reason for Disposition   [1] COVID-19 diagnosed by positive lab test AND [2] mild symptoms (e.g., cough, fever, others) AND [3] no complications or SOB    Additional Information   Negative: SEVERE difficulty breathing (e.g., struggling for each breath, speaks in single words)   Negative: Difficult to awaken or acting confused (e.g., disoriented, slurred speech)   Negative: Bluish (or gray) lips or face now   Negative: Shock suspected (e.g., cold/pale/clammy skin, too weak to stand, low BP, rapid pulse)   Negative: Sounds like a life-threatening emergency to the triager   Negative: [1] COVID-19 exposure AND [2] no symptoms   Negative: [1] Lives with someone known to have influenza (flu test positive) AND [2] flu-like symptoms (e.g., cough, runny nose, sore throat, SOB; with or without fever)   Negative: [1] Adult with possible COVID-19 symptoms AND [2] triager concerned about severity of symptoms or other causes   Negative: Immunization reaction suspected (e.g., fever, headache, muscle aches occurring during days 1-3 days after immunization)   Negative: COVID-19 and breastfeeding, questions about   Negative: SEVERE or constant chest pain or pressure (Exception: mild central chest pain, present only when coughing)   Negative: MODERATE difficulty breathing (e.g., speaks in phrases, SOB even at rest, pulse 100-120)   Negative: [1] Headache AND [2] stiff neck (can't touch chin to chest)   Negative: MILD difficulty breathing (e.g., minimal/no SOB at rest, SOB with walking, pulse <100)   Negative: Chest pain or pressure   Negative: Patient sounds very sick or weak to the triager   Negative: Fever > 103 F (39.4 C)   Negative: [1] Fever > 101 F (38.3 C) AND [2] age > 60   Negative: [1] Fever > 100.0 F (37.8 C) AND [2] bedridden (e.g., nursing home patient, CVA, chronic illness, recovering from surgery)   Negative: [1] HIGH RISK patient (e.g., age > 64 years,  diabetes, heart or lung disease, weak immune system) AND [2] new or worsening symptoms   Negative: [1] HIGH RISK patient AND [2] influenza is widespread in the community AND [3] ONE OR MORE respiratory symptoms: cough, sore throat, runny or stuffy nose   Negative: [1] HIGH RISK patient AND [2] influenza exposure within the last 7 days AND [3] ONE OR MORE respiratory symptoms: cough, sore throat, runny or stuffy nose   Negative: Fever present > 3 days (72 hours)   Negative: [1] Fever returns after gone for over 24 hours AND [2] symptoms worse or not improved   Negative: [1] Continuous (nonstop) coughing interferes with work or school AND [2] no improvement using cough treatment per protocol   Negative: [1] COVID-19 infection suspected by caller or triager AND [2] mild symptoms (cough, fever, or others) AND [3] no complications or SOB   Negative: Cough present > 3 weeks    Protocols used: CORONAVIRUS (COVID-19) DIAGNOSED OR [id number]

## 2020-12-05 ENCOUNTER — NURSE TRIAGE (OUTPATIENT)
Dept: ADMINISTRATIVE | Facility: CLINIC | Age: 35
End: 2020-12-05

## 2020-12-05 NOTE — TELEPHONE ENCOUNTER
"[id number] Call placed to [phone]: COVID-19 positive patient;  patient's identity verified with name and ; patient denies SOB, or difficulty breathing; patient states that she is having chest pain when she coughs; advised per CV-19 Chest Pain protocol to go to the nearest ER to be seen now; patient states that she is "scared" to go back into the hospital so she doesn't want to go to ER; advised per alternate disposition to contact PCP but patient says she can't reach her PCP until Monday; advised [facility] for virtual visit as alternate disposition per protocol; patient downloaded that martinez and started the request for a virtual visit prior to ending call; Advised to call back if further assistance is needed; with chest pains, SOB, or difficulty breathing to call 911 or go to the nearest ED, wear a mask and call ahead to alert them of COVID-19 status Patient verbalizes understanding and agrees to follow care advice given.       Reason for Disposition   Chest pain or pressure    Additional Information   Negative: SEVERE difficulty breathing (e.g., struggling for each breath, speaks in single words)   Negative: Difficult to awaken or acting confused (e.g., disoriented, slurred speech)   Negative: Bluish (or gray) lips or face now   Negative: Shock suspected (e.g., cold/pale/clammy skin, too weak to stand, low BP, rapid pulse)   Negative: Sounds like a life-threatening emergency to the triager   Negative: [1] COVID-19 exposure AND [2] no symptoms   Negative: [1] Lives with someone known to have influenza (flu test positive) AND [2] flu-like symptoms (e.g., cough, runny nose, sore throat, SOB; with or without fever)   Negative: [1] Adult with possible COVID-19 symptoms AND [2] triager concerned about severity of symptoms or other causes   Negative: Immunization reaction suspected (e.g., fever, headache, muscle aches occurring during days 1-3 days after immunization)   Negative: COVID-19 and " breastfeeding, questions about   Negative: SEVERE or constant chest pain or pressure (Exception: mild central chest pain, present only when coughing)   Negative: MODERATE difficulty breathing (e.g., speaks in phrases, SOB even at rest, pulse 100-120)   Negative: [1] Headache AND [2] stiff neck (can't touch chin to chest)   Negative: MILD difficulty breathing (e.g., minimal/no SOB at rest, SOB with walking, pulse <100)    Protocols used: CORONAVIRUS (COVID-19) DIAGNOSED OR [id number]

## 2020-12-07 ENCOUNTER — NURSE TRIAGE (OUTPATIENT)
Dept: ADMINISTRATIVE | Facility: CLINIC | Age: 35
End: 2020-12-07

## 2020-12-07 NOTE — TELEPHONE ENCOUNTER
Calling due to cough and general aches. States promethazine causes diarrhea.    Reason for Disposition   [1] COVID-19 diagnosed by positive lab test AND [2] mild symptoms (e.g., cough, fever, others) AND [3] no complications or SOB    Additional Information   Negative: Severe difficulty breathing (e.g., struggling for each breath, speaks in single words)   Negative: Difficult to awaken or acting confused (e.g., disoriented, slurred speech)   Negative: Bluish (or gray) lips or face now   Negative: Shock suspected (e.g., cold/pale/clammy skin, too weak to stand, low BP, rapid pulse)   Negative: Sounds like a life-threatening emergency to the triager   Negative: [1] COVID-19 exposure AND [2] no symptoms   Negative: COVID-19 and Breastfeeding, questions about   Negative: [1] Adult with possible COVID-19 symptoms AND [2] triager concerned about severity of symptoms or other causes   Negative: SEVERE or constant chest pain or pressure (Exception: mild central chest pain, present only when coughing)   Negative: MODERATE difficulty breathing (e.g., speaks in phrases, SOB even at rest, pulse 100-120)   Negative: MILD difficulty breathing (e.g., minimal/no SOB at rest, SOB with walking, pulse <100)   Negative: Chest pain   Negative: Patient sounds very sick or weak to the triager   Negative: Fever > 103 F (39.4 C)   Negative: [1] Fever > 101 F (38.3 C) AND [2] age > 60   Negative: [1] Fever > 100.0 F (37.8 C) AND [2] bedridden (e.g., nursing home patient, CVA, chronic illness, recovering from surgery)   Negative: HIGH RISK patient (e.g., age > 64 years, diabetes, heart or lung disease, weak immune system) (Exception: has already been evaluated by healthcare provider and has no new or worsening symptoms)   Negative: [1] COVID-19 infection suspected by caller or triager AND [2] mild symptoms (cough, fever, or others) AND [3] no complications or SOB   Negative: Fever present > 3 days (72 hours)   Negative:  [1] Fever returns after gone for over 24 hours AND [2] symptoms worse or not improved   Negative: [1] Continuous (nonstop) coughing interferes with work or school AND [2] no improvement using cough treatment per protocol   Negative: Cough present > 3 weeks    Protocols used: CORONAVIRUS (COVID-19) DIAGNOSED OR [id number]

## 2020-12-10 ENCOUNTER — HOSPITAL ENCOUNTER (INPATIENT)
Facility: HOSPITAL | Age: 35
LOS: 6 days | Discharge: HOME | DRG: 177 | End: 2020-12-16
Attending: EMERGENCY MEDICINE | Admitting: HOSPITALIST
Payer: MEDICAID

## 2020-12-10 DIAGNOSIS — M54.9 BACK PAIN, UNSPECIFIED BACK LOCATION, UNSPECIFIED BACK PAIN LATERALITY, UNSPECIFIED CHRONICITY: ICD-10-CM

## 2020-12-10 DIAGNOSIS — U07.1 COVID-19 VIRUS INFECTION: Primary | ICD-10-CM

## 2020-12-10 DIAGNOSIS — J06.9 ACUTE RESPIRATORY DISEASE DUE TO COVID-19 VIRUS: ICD-10-CM

## 2020-12-10 DIAGNOSIS — U07.1 ACUTE RESPIRATORY DISEASE DUE TO COVID-19 VIRUS: ICD-10-CM

## 2020-12-10 DIAGNOSIS — R07.89 CHEST DISCOMFORT: ICD-10-CM

## 2020-12-10 DIAGNOSIS — J96.01 ACUTE RESPIRATORY FAILURE WITH HYPOXIA: ICD-10-CM

## 2020-12-10 DIAGNOSIS — R09.02 HYPOXIA: ICD-10-CM

## 2020-12-10 DIAGNOSIS — R07.9 CHEST PAIN: ICD-10-CM

## 2020-12-10 DIAGNOSIS — I10 ESSENTIAL HYPERTENSION: Chronic | ICD-10-CM

## 2020-12-10 PROCEDURE — 93005 ELECTROCARDIOGRAM TRACING: CPT

## 2020-12-10 PROCEDURE — 12000002 HC ACUTE/MED SURGE SEMI-PRIVATE ROOM

## 2020-12-10 PROCEDURE — 96361 HYDRATE IV INFUSION ADD-ON: CPT

## 2020-12-10 PROCEDURE — 63600175 PHARM REV CODE 636 W HCPCS: Performed by: STUDENT IN AN ORGANIZED HEALTH CARE EDUCATION/TRAINING PROGRAM

## 2020-12-10 PROCEDURE — 99285 EMERGENCY DEPT VISIT HI MDM: CPT | Mod: 25

## 2020-12-10 PROCEDURE — 93010 EKG 12-LEAD: ICD-10-PCS | Mod: ,,, | Performed by: INTERNAL MEDICINE

## 2020-12-10 PROCEDURE — 99285 PR EMERGENCY DEPT VISIT,LEVEL V: ICD-10-PCS | Mod: ,,, | Performed by: EMERGENCY MEDICINE

## 2020-12-10 PROCEDURE — 93010 ELECTROCARDIOGRAM REPORT: CPT | Mod: 59,,, | Performed by: INTERNAL MEDICINE

## 2020-12-10 PROCEDURE — 93010 ELECTROCARDIOGRAM REPORT: CPT | Mod: ,,, | Performed by: INTERNAL MEDICINE

## 2020-12-10 PROCEDURE — 99285 EMERGENCY DEPT VISIT HI MDM: CPT | Mod: ,,, | Performed by: EMERGENCY MEDICINE

## 2020-12-10 PROCEDURE — 87502 INFLUENZA DNA AMP PROBE: CPT | Mod: 59

## 2020-12-10 RX ADMIN — DEXAMETHASONE 6 MG: 4 TABLET ORAL at 11:12

## 2020-12-11 PROBLEM — U07.1 COVID-19 VIRUS INFECTION: Status: ACTIVE | Noted: 2020-12-11

## 2020-12-11 PROBLEM — I10 ESSENTIAL HYPERTENSION: Chronic | Status: ACTIVE | Noted: 2020-12-11

## 2020-12-11 LAB
25(OH)D3+25(OH)D2 SERPL-MCNC: 29 NG/ML (ref 30–96)
ALBUMIN SERPL BCP-MCNC: 3.1 G/DL (ref 3.5–5.2)
ALP SERPL-CCNC: 51 U/L (ref 55–135)
ALT SERPL W/O P-5'-P-CCNC: 44 U/L (ref 10–44)
ANION GAP SERPL CALC-SCNC: 13 MMOL/L (ref 8–16)
ANISOCYTOSIS BLD QL SMEAR: SLIGHT
AST SERPL-CCNC: 56 U/L (ref 10–40)
B-HCG UR QL: NEGATIVE
BACTERIA #/AREA URNS AUTO: NORMAL /HPF
BASOPHILS # BLD AUTO: 0.03 K/UL (ref 0–0.2)
BASOPHILS NFR BLD: 0.6 % (ref 0–1.9)
BILIRUB SERPL-MCNC: 0.4 MG/DL (ref 0.1–1)
BILIRUB UR QL STRIP: NEGATIVE
BNP SERPL-MCNC: <10 PG/ML (ref 0–99)
BUN SERPL-MCNC: 15 MG/DL (ref 6–20)
CALCIUM SERPL-MCNC: 8.7 MG/DL (ref 8.7–10.5)
CHLORIDE SERPL-SCNC: 96 MMOL/L (ref 95–110)
CK SERPL-CCNC: 157 U/L (ref 20–180)
CLARITY UR REFRACT.AUTO: ABNORMAL
CO2 SERPL-SCNC: 23 MMOL/L (ref 23–29)
COLOR UR AUTO: YELLOW
CREAT SERPL-MCNC: 0.9 MG/DL (ref 0.5–1.4)
CRP SERPL-MCNC: 126.2 MG/L (ref 0–8.2)
CTP QC/QA: YES
D DIMER PPP IA.FEU-MCNC: 0.64 MG/L FEU
DIFFERENTIAL METHOD: ABNORMAL
EOSINOPHIL # BLD AUTO: 0 K/UL (ref 0–0.5)
EOSINOPHIL NFR BLD: 0 % (ref 0–8)
ERYTHROCYTE [DISTWIDTH] IN BLOOD BY AUTOMATED COUNT: 14.4 % (ref 11.5–14.5)
ERYTHROCYTE [SEDIMENTATION RATE] IN BLOOD BY WESTERGREN METHOD: 89 MM/HR (ref 0–36)
EST. GFR  (AFRICAN AMERICAN): >60 ML/MIN/1.73 M^2
EST. GFR  (NON AFRICAN AMERICAN): >60 ML/MIN/1.73 M^2
FERRITIN SERPL-MCNC: 1900 NG/ML (ref 20–300)
GLUCOSE SERPL-MCNC: 153 MG/DL (ref 70–110)
GLUCOSE UR QL STRIP: NEGATIVE
HCT VFR BLD AUTO: 46.8 % (ref 37–48.5)
HGB BLD-MCNC: 15.3 G/DL (ref 12–16)
HGB UR QL STRIP: NEGATIVE
HYALINE CASTS UR QL AUTO: 0 /LPF
HYPOCHROMIA BLD QL SMEAR: ABNORMAL
IMM GRANULOCYTES # BLD AUTO: 0.01 K/UL (ref 0–0.04)
IMM GRANULOCYTES NFR BLD AUTO: 0.2 % (ref 0–0.5)
INFLUENZA A, MOLECULAR: NEGATIVE
INFLUENZA B, MOLECULAR: NEGATIVE
INR PPP: 0.9 (ref 0.8–1.2)
KETONES UR QL STRIP: ABNORMAL
LACTATE SERPL-SCNC: 0.7 MMOL/L (ref 0.5–2.2)
LACTATE SERPL-SCNC: 2.7 MMOL/L (ref 0.5–2.2)
LDH SERPL L TO P-CCNC: 508 U/L (ref 110–260)
LEUKOCYTE ESTERASE UR QL STRIP: NEGATIVE
LYMPHOCYTES # BLD AUTO: 1.6 K/UL (ref 1–4.8)
LYMPHOCYTES NFR BLD: 31.9 % (ref 18–48)
MCH RBC QN AUTO: 24.4 PG (ref 27–31)
MCHC RBC AUTO-ENTMCNC: 32.7 G/DL (ref 32–36)
MCV RBC AUTO: 75 FL (ref 82–98)
MICROSCOPIC COMMENT: NORMAL
MONOCYTES # BLD AUTO: 0.3 K/UL (ref 0.3–1)
MONOCYTES NFR BLD: 5.3 % (ref 4–15)
NEUTROPHILS # BLD AUTO: 3.1 K/UL (ref 1.8–7.7)
NEUTROPHILS NFR BLD: 62 % (ref 38–73)
NITRITE UR QL STRIP: NEGATIVE
NRBC BLD-RTO: 0 /100 WBC
PH UR STRIP: 6 [PH] (ref 5–8)
PLATELET # BLD AUTO: 227 K/UL (ref 150–350)
PLATELET BLD QL SMEAR: ABNORMAL
PMV BLD AUTO: 10.6 FL (ref 9.2–12.9)
POTASSIUM SERPL-SCNC: 3.5 MMOL/L (ref 3.5–5.1)
PROCALCITONIN SERPL IA-MCNC: 0.22 NG/ML
PROT SERPL-MCNC: 7.5 G/DL (ref 6–8.4)
PROT UR QL STRIP: ABNORMAL
PROTHROMBIN TIME: 10.2 SEC (ref 9–12.5)
RBC # BLD AUTO: 6.28 M/UL (ref 4–5.4)
RBC #/AREA URNS AUTO: 1 /HPF (ref 0–4)
SODIUM SERPL-SCNC: 132 MMOL/L (ref 136–145)
SP GR UR STRIP: 1.02 (ref 1–1.03)
SPECIMEN SOURCE: NORMAL
SQUAMOUS #/AREA URNS AUTO: 2 /HPF
TROPONIN I SERPL DL<=0.01 NG/ML-MCNC: 0.01 NG/ML (ref 0–0.03)
URN SPEC COLLECT METH UR: ABNORMAL
WBC # BLD AUTO: 4.95 K/UL (ref 3.9–12.7)
WBC #/AREA URNS AUTO: 2 /HPF (ref 0–5)

## 2020-12-11 PROCEDURE — 87040 BLOOD CULTURE FOR BACTERIA: CPT | Mod: 59

## 2020-12-11 PROCEDURE — 80053 COMPREHEN METABOLIC PANEL: CPT

## 2020-12-11 PROCEDURE — 25000003 PHARM REV CODE 250: Performed by: HOSPITALIST

## 2020-12-11 PROCEDURE — 81025 URINE PREGNANCY TEST: CPT | Performed by: EMERGENCY MEDICINE

## 2020-12-11 PROCEDURE — 85652 RBC SED RATE AUTOMATED: CPT

## 2020-12-11 PROCEDURE — 36415 COLL VENOUS BLD VENIPUNCTURE: CPT

## 2020-12-11 PROCEDURE — 83880 ASSAY OF NATRIURETIC PEPTIDE: CPT

## 2020-12-11 PROCEDURE — 85610 PROTHROMBIN TIME: CPT

## 2020-12-11 PROCEDURE — 96366 THER/PROPH/DIAG IV INF ADDON: CPT

## 2020-12-11 PROCEDURE — 82306 VITAMIN D 25 HYDROXY: CPT

## 2020-12-11 PROCEDURE — 20600001 HC STEP DOWN PRIVATE ROOM

## 2020-12-11 PROCEDURE — 99220 PR INITIAL OBSERVATION CARE,LEVL III: ICD-10-PCS | Mod: ,,, | Performed by: PHYSICIAN ASSISTANT

## 2020-12-11 PROCEDURE — 96376 TX/PRO/DX INJ SAME DRUG ADON: CPT

## 2020-12-11 PROCEDURE — 82550 ASSAY OF CK (CPK): CPT

## 2020-12-11 PROCEDURE — 94799 UNLISTED PULMONARY SVC/PX: CPT

## 2020-12-11 PROCEDURE — 27000646 HC AEROBIKA DEVICE

## 2020-12-11 PROCEDURE — 83605 ASSAY OF LACTIC ACID: CPT

## 2020-12-11 PROCEDURE — 85379 FIBRIN DEGRADATION QUANT: CPT

## 2020-12-11 PROCEDURE — 86140 C-REACTIVE PROTEIN: CPT

## 2020-12-11 PROCEDURE — 87070 CULTURE OTHR SPECIMN AEROBIC: CPT

## 2020-12-11 PROCEDURE — 94664 DEMO&/EVAL PT USE INHALER: CPT

## 2020-12-11 PROCEDURE — 85025 COMPLETE CBC W/AUTO DIFF WBC: CPT

## 2020-12-11 PROCEDURE — 84145 PROCALCITONIN (PCT): CPT

## 2020-12-11 PROCEDURE — G0378 HOSPITAL OBSERVATION PER HR: HCPCS

## 2020-12-11 PROCEDURE — 96375 TX/PRO/DX INJ NEW DRUG ADDON: CPT

## 2020-12-11 PROCEDURE — 25000003 PHARM REV CODE 250: Performed by: PHYSICIAN ASSISTANT

## 2020-12-11 PROCEDURE — 82728 ASSAY OF FERRITIN: CPT

## 2020-12-11 PROCEDURE — 87205 SMEAR GRAM STAIN: CPT

## 2020-12-11 PROCEDURE — 63600175 PHARM REV CODE 636 W HCPCS: Performed by: PHYSICIAN ASSISTANT

## 2020-12-11 PROCEDURE — 81001 URINALYSIS AUTO W/SCOPE: CPT

## 2020-12-11 PROCEDURE — 25000003 PHARM REV CODE 250: Performed by: EMERGENCY MEDICINE

## 2020-12-11 PROCEDURE — 83615 LACTATE (LD) (LDH) ENZYME: CPT

## 2020-12-11 PROCEDURE — 96361 HYDRATE IV INFUSION ADD-ON: CPT

## 2020-12-11 PROCEDURE — 96372 THER/PROPH/DIAG INJ SC/IM: CPT

## 2020-12-11 PROCEDURE — 83605 ASSAY OF LACTIC ACID: CPT | Mod: 91

## 2020-12-11 PROCEDURE — 87502 INFLUENZA DNA AMP PROBE: CPT

## 2020-12-11 PROCEDURE — 99900035 HC TECH TIME PER 15 MIN (STAT)

## 2020-12-11 PROCEDURE — 84484 ASSAY OF TROPONIN QUANT: CPT

## 2020-12-11 PROCEDURE — 27000221 HC OXYGEN, UP TO 24 HOURS

## 2020-12-11 PROCEDURE — 94761 N-INVAS EAR/PLS OXIMETRY MLT: CPT

## 2020-12-11 PROCEDURE — 63600175 PHARM REV CODE 636 W HCPCS: Performed by: EMERGENCY MEDICINE

## 2020-12-11 PROCEDURE — 99900031 HC PATIENT EDUCATION (STAT)

## 2020-12-11 PROCEDURE — A4216 STERILE WATER/SALINE, 10 ML: HCPCS | Performed by: PHYSICIAN ASSISTANT

## 2020-12-11 PROCEDURE — 99220 PR INITIAL OBSERVATION CARE,LEVL III: CPT | Mod: ,,, | Performed by: PHYSICIAN ASSISTANT

## 2020-12-11 PROCEDURE — 96365 THER/PROPH/DIAG IV INF INIT: CPT

## 2020-12-11 RX ORDER — CEFTRIAXONE 1 G/1
1 INJECTION, POWDER, FOR SOLUTION INTRAMUSCULAR; INTRAVENOUS
Status: COMPLETED | OUTPATIENT
Start: 2020-12-11 | End: 2020-12-11

## 2020-12-11 RX ORDER — ACETAMINOPHEN 325 MG/1
650 TABLET ORAL
Status: COMPLETED | OUTPATIENT
Start: 2020-12-11 | End: 2020-12-11

## 2020-12-11 RX ORDER — ASCORBIC ACID 500 MG
500 TABLET ORAL 2 TIMES DAILY
Status: DISCONTINUED | OUTPATIENT
Start: 2020-12-11 | End: 2020-12-16 | Stop reason: HOSPADM

## 2020-12-11 RX ORDER — ONDANSETRON 2 MG/ML
4 INJECTION INTRAMUSCULAR; INTRAVENOUS EVERY 8 HOURS PRN
Status: DISCONTINUED | OUTPATIENT
Start: 2020-12-11 | End: 2020-12-16 | Stop reason: HOSPADM

## 2020-12-11 RX ORDER — HYDROCHLOROTHIAZIDE 12.5 MG/1
12.5 TABLET ORAL DAILY
Status: DISCONTINUED | OUTPATIENT
Start: 2020-12-11 | End: 2020-12-15

## 2020-12-11 RX ORDER — CEFTRIAXONE 1 G/1
1 INJECTION, POWDER, FOR SOLUTION INTRAMUSCULAR; INTRAVENOUS NIGHTLY
Status: COMPLETED | OUTPATIENT
Start: 2020-12-12 | End: 2020-12-14

## 2020-12-11 RX ORDER — ENOXAPARIN SODIUM 100 MG/ML
40 INJECTION SUBCUTANEOUS EVERY 24 HOURS
Status: DISCONTINUED | OUTPATIENT
Start: 2020-12-11 | End: 2020-12-16 | Stop reason: HOSPADM

## 2020-12-11 RX ORDER — AZITHROMYCIN 250 MG/1
500 TABLET, FILM COATED ORAL
Status: DISCONTINUED | OUTPATIENT
Start: 2020-12-12 | End: 2020-12-11

## 2020-12-11 RX ORDER — AZITHROMYCIN 250 MG/1
500 TABLET, FILM COATED ORAL
Status: COMPLETED | OUTPATIENT
Start: 2020-12-12 | End: 2020-12-13

## 2020-12-11 RX ORDER — CETIRIZINE HYDROCHLORIDE 10 MG/1
10 TABLET ORAL DAILY
Status: DISCONTINUED | OUTPATIENT
Start: 2020-12-11 | End: 2020-12-16 | Stop reason: HOSPADM

## 2020-12-11 RX ORDER — ALBUTEROL SULFATE 90 UG/1
2 AEROSOL, METERED RESPIRATORY (INHALATION) EVERY 6 HOURS PRN
Status: DISCONTINUED | OUTPATIENT
Start: 2020-12-11 | End: 2020-12-11

## 2020-12-11 RX ORDER — LIDOCAINE 50 MG/G
1 PATCH TOPICAL
Status: DISCONTINUED | OUTPATIENT
Start: 2020-12-11 | End: 2020-12-16 | Stop reason: HOSPADM

## 2020-12-11 RX ORDER — KETOROLAC TROMETHAMINE 30 MG/ML
15 INJECTION, SOLUTION INTRAMUSCULAR; INTRAVENOUS
Status: COMPLETED | OUTPATIENT
Start: 2020-12-11 | End: 2020-12-11

## 2020-12-11 RX ORDER — IBUPROFEN 200 MG
24 TABLET ORAL
Status: DISCONTINUED | OUTPATIENT
Start: 2020-12-11 | End: 2020-12-16 | Stop reason: HOSPADM

## 2020-12-11 RX ORDER — KETOROLAC TROMETHAMINE 30 MG/ML
15 INJECTION, SOLUTION INTRAMUSCULAR; INTRAVENOUS EVERY 6 HOURS PRN
Status: DISPENSED | OUTPATIENT
Start: 2020-12-11 | End: 2020-12-14

## 2020-12-11 RX ORDER — BENZONATATE 100 MG/1
100 CAPSULE ORAL 3 TIMES DAILY PRN
Status: DISCONTINUED | OUTPATIENT
Start: 2020-12-11 | End: 2020-12-16 | Stop reason: HOSPADM

## 2020-12-11 RX ORDER — HYDROXYZINE HYDROCHLORIDE 25 MG/1
25 TABLET, FILM COATED ORAL 3 TIMES DAILY PRN
Status: DISCONTINUED | OUTPATIENT
Start: 2020-12-11 | End: 2020-12-16 | Stop reason: HOSPADM

## 2020-12-11 RX ORDER — SODIUM CHLORIDE 0.9 % (FLUSH) 0.9 %
10 SYRINGE (ML) INJECTION
Status: DISCONTINUED | OUTPATIENT
Start: 2020-12-11 | End: 2020-12-16 | Stop reason: HOSPADM

## 2020-12-11 RX ORDER — GLUCAGON 1 MG
1 KIT INJECTION
Status: DISCONTINUED | OUTPATIENT
Start: 2020-12-11 | End: 2020-12-16 | Stop reason: HOSPADM

## 2020-12-11 RX ORDER — LOPERAMIDE HYDROCHLORIDE 2 MG/1
2 CAPSULE ORAL EVERY 6 HOURS PRN
Status: DISCONTINUED | OUTPATIENT
Start: 2020-12-11 | End: 2020-12-16 | Stop reason: HOSPADM

## 2020-12-11 RX ORDER — BENZONATATE 100 MG/1
200 CAPSULE ORAL
Status: COMPLETED | OUTPATIENT
Start: 2020-12-11 | End: 2020-12-11

## 2020-12-11 RX ORDER — ACETAMINOPHEN 325 MG/1
650 TABLET ORAL EVERY 4 HOURS PRN
Status: DISCONTINUED | OUTPATIENT
Start: 2020-12-11 | End: 2020-12-16 | Stop reason: HOSPADM

## 2020-12-11 RX ORDER — GUAIFENESIN/DEXTROMETHORPHAN 100-10MG/5
5 SYRUP ORAL EVERY 4 HOURS PRN
Status: DISCONTINUED | OUTPATIENT
Start: 2020-12-11 | End: 2020-12-16 | Stop reason: HOSPADM

## 2020-12-11 RX ORDER — CHOLECALCIFEROL (VITAMIN D3) 25 MCG
1000 TABLET ORAL DAILY
Status: DISCONTINUED | OUTPATIENT
Start: 2020-12-11 | End: 2020-12-16 | Stop reason: HOSPADM

## 2020-12-11 RX ORDER — TALC
6 POWDER (GRAM) TOPICAL NIGHTLY PRN
Status: DISCONTINUED | OUTPATIENT
Start: 2020-12-11 | End: 2020-12-16 | Stop reason: HOSPADM

## 2020-12-11 RX ORDER — ALBUTEROL SULFATE 90 UG/1
2 AEROSOL, METERED RESPIRATORY (INHALATION) EVERY 6 HOURS PRN
Status: DISCONTINUED | OUTPATIENT
Start: 2020-12-11 | End: 2020-12-16 | Stop reason: HOSPADM

## 2020-12-11 RX ORDER — IBUPROFEN 200 MG
16 TABLET ORAL
Status: DISCONTINUED | OUTPATIENT
Start: 2020-12-11 | End: 2020-12-16 | Stop reason: HOSPADM

## 2020-12-11 RX ADMIN — HYDROCHLOROTHIAZIDE 12.5 MG: 12.5 TABLET ORAL at 08:12

## 2020-12-11 RX ADMIN — GUAIFENESIN AND DEXTROMETHORPHAN 5 ML: 100; 10 SYRUP ORAL at 10:12

## 2020-12-11 RX ADMIN — KETOROLAC TROMETHAMINE 15 MG: 30 INJECTION, SOLUTION INTRAMUSCULAR at 08:12

## 2020-12-11 RX ADMIN — SODIUM CHLORIDE 1000 ML: 0.9 INJECTION, SOLUTION INTRAVENOUS at 12:12

## 2020-12-11 RX ADMIN — CEFTRIAXONE SODIUM 1 G: 1 INJECTION, POWDER, FOR SOLUTION INTRAMUSCULAR; INTRAVENOUS at 01:12

## 2020-12-11 RX ADMIN — KETOROLAC TROMETHAMINE 15 MG: 30 INJECTION, SOLUTION INTRAMUSCULAR at 02:12

## 2020-12-11 RX ADMIN — OXYCODONE HYDROCHLORIDE AND ACETAMINOPHEN 500 MG: 500 TABLET ORAL at 08:12

## 2020-12-11 RX ADMIN — CHOLECALCIFEROL (VITAMIN D3) 25 MCG (1,000 UNIT) TABLET 1000 UNITS: TABLET at 08:12

## 2020-12-11 RX ADMIN — CETIRIZINE HYDROCHLORIDE 10 MG: 10 TABLET, FILM COATED ORAL at 08:12

## 2020-12-11 RX ADMIN — KETOROLAC TROMETHAMINE 15 MG: 30 INJECTION, SOLUTION INTRAMUSCULAR at 10:12

## 2020-12-11 RX ADMIN — Medication 10 ML: at 08:12

## 2020-12-11 RX ADMIN — Medication 1 TABLET: at 08:12

## 2020-12-11 RX ADMIN — BENZONATATE 200 MG: 100 CAPSULE ORAL at 01:12

## 2020-12-11 RX ADMIN — MELATONIN TAB 3 MG 6 MG: 3 TAB at 04:12

## 2020-12-11 RX ADMIN — ENOXAPARIN SODIUM 40 MG: 40 INJECTION SUBCUTANEOUS at 08:12

## 2020-12-11 RX ADMIN — LIDOCAINE 1 PATCH: 50 PATCH TOPICAL at 04:12

## 2020-12-11 RX ADMIN — HYDROXYZINE HYDROCHLORIDE 25 MG: 25 TABLET, FILM COATED ORAL at 04:12

## 2020-12-11 RX ADMIN — ACETAMINOPHEN 650 MG: 325 TABLET ORAL at 01:12

## 2020-12-11 RX ADMIN — DEXAMETHASONE 6 MG: 4 TABLET ORAL at 08:12

## 2020-12-11 RX ADMIN — REMDESIVIR 200 MG: 100 INJECTION, POWDER, LYOPHILIZED, FOR SOLUTION INTRAVENOUS at 10:12

## 2020-12-11 RX ADMIN — GUAIFENESIN AND DEXTROMETHORPHAN 5 ML: 100; 10 SYRUP ORAL at 08:12

## 2020-12-11 RX ADMIN — AZITHROMYCIN MONOHYDRATE 500 MG: 500 INJECTION, POWDER, LYOPHILIZED, FOR SOLUTION INTRAVENOUS at 02:12

## 2020-12-11 NOTE — H&P
[facility] Medicine  History and Physical  [facility]      Patient Name: Aniket Ac  MRN:  [id number]  Hospital Medicine Team: Networked reference to record PCT  Alisa Xiong PA-C  Date of Admission:  12/10/2020     Length of Stay:  LOS: 0 days     Principal Problem: COVID-19 Virus Infection    Chief complaint    SOB    HPI    Patient is a 35 year old lady with a h/o HTN.  She presents with worsening SOB and cough.  She was diagnosed with COVID on 12/3/2020 after experiencing cough and HA in the setting of her SO's positive COVID test.  On initial diagnosis, she received steroids and was discharged with an inhaler.  She states she initially felt better, but over the past few days symptoms have worsened.  She states the albuterol inhaler has not helped, but made her have palpitations.  She complains of pleuritic chest pain, back pain, N/V/D, fatigue, fever/chills, decreased appetite, decreased sense of smell and taste.  She is tearful on exam.    Review of Systems    Constitutional: Positive for fever, chills, fatigue, poor appetite   HENT: Negative for trouble swallowing.    Eyes: Negative for photophobia, visual disturbance.   Respiratory: Positive for cough, shortness of breath  Cardiovascular: Negative for chest pain, palpitations, leg swelling.   Gastrointestinal: Positive for nausea, vomiting, and diarrhea. Negative for abdominal pain, constipation.   Endocrine: Negative for cold intolerance, heat intolerance.   Genitourinary: Negative for dysuria, frequency.   Musculoskeletal: Negative for arthralgias, myalgias.   Skin: Negative for rash  Neurological: Negative for dizziness, syncope, light-headedness.   Psychiatric/Behavioral: Negative for confusion, hallucinations, anxiety    Past Medical History:   Diagnosis Date    Essential hypertension 2020     Past Surgical History:   Procedure Laterality Date     SECTION, CLASSIC      CHOLECYSTECTOMY      TUBAL  LIGATION       History reviewed. No pertinent family history.  Social History     Socioeconomic History    Marital status:      Spouse name: Not on file    Number of children: Not on file    Years of education: Not on file    Highest education level: Not on file   Occupational History    Not on file   Social Needs    Financial resource strain: Not on file    Food insecurity     Worry: Not on file     Inability: Not on file    Transportation needs     Medical: Not on file     Non-medical: Not on file   Tobacco Use    Smoking status: Never Smoker   Substance and Sexual Activity    Alcohol use: Yes    Drug use: No    Sexual activity: Not on file   Lifestyle    Physical activity     Days per week: Not on file     Minutes per session: Not on file    Stress: Not on file   Relationships    Social connections     Talks on phone: Not on file     Gets together: Not on file     Attends [employer] service: Not on file     Active member of club or organization: Not on file     Attends meetings of clubs or organizations: Not on file     Relationship status: Not on file   Other Topics Concern    Not on file   Social History Narrative    Not on file       Medications  No current facility-administered medications on file prior to encounter.      Current Outpatient Medications on File Prior to Encounter   Medication Sig Dispense Refill    ACETAMINOPHEN WITH CODEINE (TYLENOL-CODEINE #3 ORAL) Take by mouth.      albuterol (PROVENTIL/VENTOLIN HFA) 90 mcg/actuation inhaler Inhale 1-2 puffs into the lungs every 6 (six) hours as needed for Wheezing. Rescue 8 g 0    ascorbic acid, vitamin C, (VITAMIN C) 1000 MG tablet Take 1,000 mg by mouth once daily.      benzonatate (TESSALON) 100 MG capsule Take 1 capsule (100 mg total) by mouth 3 (three) times daily as needed. 20 capsule 0    cetirizine (ZYRTEC) 10 MG tablet Take 1 tablet (10 mg total) by mouth once daily. 30 tablet 0    hydroCHLOROthiazide (HYDRODIURIL) 25  MG tablet Take 0.5 tablets (12.5 mg total) by mouth once daily. 30 tablet 0    itraconazole (SPORANOX) 100 mg Cap Take 200 mg by mouth once daily.      lactobacillus acidophilus & bulgar (LACTINEX) 100 million cell packet Take 1 tablet by mouth 2 (two) times daily.      lidocaine (LIDODERM) 5 % Place 1 patch onto the skin once daily. Remove & Discard patch within 12 hours or as directed by MD 15 patch 0    TURMERIC ROOT EXTRACT ORAL Take 500 mg by mouth.      vitamin A 89468 UNIT capsule Take 10,000 Units by mouth once daily.      vitamin D (VITAMIN D3) 1000 units Tab Take 1,000 Units by mouth once daily.         Allergies  Penicillins    Physical Examination  Temp:  [100.3 °F (37.9 °C)]   Pulse:  []   Resp:  [18-21]   BP: (106-141)/(66-91)   SpO2:  [93 %-100 %]     Gen: NAD, conversant, anxious  Head: NC, AT  Eyes: PERRLA, EOMI  Throat: MMM, OP clear  CV: RRR, no M/R/G, no peripheral edema, no JVD  Resp: coarse bilateral breath sounds, no increased work of breathing.  GI: Soft, NT, ND, +BS  Ext: MAEW, no c/c/e  Neuro: AAOx3, CN grossly intact, no focal neurologic deficits  Psychiatry: Normal mood, normal affect    Laboratory:  Recent Labs   Lab 12/11/20  0018   WBC 4.95   LYMPH 31.9  1.6   HGB 15.3   HCT 46.8        No results for input(s): NA, K, CL, CO2, BUN, CREATININE, GLU, CALCIUM, MG, PHOS in the last 168 hours.  No results for input(s): ALKPHOS, ALT, AST, ALBUMIN, PROT, BILITOT, INR in the last 168 hours.   Recent Labs     12/11/20  0018   *   LACTATE 2.7*       All labs within the last 24 hours were reviewed.     Microbiology:  Lab Results   Component Value Date    [id number] Positive (A) 12/03/2020       Microbiology Results (last 7 days)     ** No results found for the last 168 hours. **            Imaging      No results found for this or any previous visit.    X-Ray Chest AP Portable  Narrative: EXAMINATION:  AP PORTABLE CHEST    CLINICAL HISTORY:  covid;    TECHNIQUE:  AP  portable chest radiograph was submitted.    COMPARISON:  12/03/2020    FINDINGS:  AP portable chest radiograph demonstrates mild enlargement of the cardiac silhouette.  There is subtle asymmetric density in the left retrocardiac region.  Findings may represent atelectasis or infiltrate.  There is no pneumothorax or pleural effusion.  Cholecystectomy clips are present.  Impression: As above described.    Electronically signed by: Sahara Lee  Date:    12/11/2020  Time:    00:41      All imaging within the last 24 hours was reviewed.       Assessment and Plan:    Active Hospital Problems    Diagnosis  POA    COVID-19 virus infection [U07.1]  Yes    Essential hypertension [I10]  Yes     Chronic      Resolved Hospital Problems   No resolved problems to display.       COVID-19 Virus Infection  Viral Pneumonia due to COVID-19  - COVID-19 testing: Collection Date: 3/25/2020 Collection Time:  11:39 PM   - Isolation: Airborne/Droplet. Surgical mask on patient. Notify Infection Control  - Diagnostics: Trend Q48hrs if stable, more frequently if patient decompensating       - CBC:  See above       - CMP:  Pending       - Mg:  Pending       - D-dimer:  Pending       - Ferritin:  Pending       - CRP:  Pending       - CPK:  Pending       - LDH:  508       - Vitamin D:  Pending       - BNP:  Pending       - Troponin:  Pending       - Procalcitonin:  Pending       - Rapid influenza:  Pending       - Legionella antigen:  Pending       - Sputum Culture:  Pending       - Blood Culture:  Pending       - Urinalysis with reflex culture:  Pending       - ECG:  Sinus tachycardia, HR 103bpm       - CXR:  AP portable chest radiograph demonstrates mild enlargement of the cardiac silhouette.  There is subtle asymmetric density in the left retrocardiac region.  Findings may represent atelectasis or infiltrate.  There is no pneumothorax or pleural effusion.  Cholecystectomy clips are present.  Lymphopenia, hyponatremia, hyperferritinemia,  "elevated troponin, elevated d-dimer, age, and medical comorbidities are significant predictors of poor clinical outcome    - Management: Per [facility] COVID Treatment Protocol    - Telemetry & Continuous Pulse Oximetry    - Nutrition:        - Multivitamin PO daily       - Boost supplement       - Vitamin D 1000IU daily if deficient       - Ascorbic acid 500mg PO bid    - Supportive Care:       - acetaminophen 650mg PO Q6hr PRN fever/headache       - loperamide PRN viral diarrhea       - IVF if indicated, restrictive strategy preferred, no maintenance IV if able       - VTE PPx: enoxaparin or heparin SQ unless contraindicated    - Antibiotics:       - ceftriaxone 1g Q24h x 5 days       - azithromycin 500mg po x1, then 250mg po daily x 4 days        - Dexamethasone 6mg daily    Acute Hypoxemic Respiratory Failure    - Order RT consult via Respiratory Communication for COVID Protocols    - Incentive Spirometer Q4h, Flutter Valve Q4h    - Continuous Pulse Oximetry, goal SpO2 92-96%    - Supplemental O2 via LFNC, VentiMask, or HFNC (see Respiratory Support Oxygen Therapies)    - If wheezing, albuterol INH Q6h scheduled & PRN    - Proning Protocol if patient is a candidate (see  Proning Protocol)   - GCS >13, able to self-prone    - If deterioration, may warrant trial of NIPPV in neg pressure room or immediate ICU consult    HTN  - Continue HCTZ 12.5mg daily.    Advance Care Planning  Full code  If patient transitions to Comfort-Focused Care, please place "Nurse Communication: End of Life Care, family members allowed to visit, including spouse/partner and adult children [please list names]. Please ask family to visit as a group and leave as a group.         VTE High Risk Prophylaxis:   VTE Risk Mitigation (From admission, onward)         Ordered     enoxaparin injection 40 mg  Every 24 hours      12/11/20 0241     IP VTE HIGH RISK PATIENT  Once      12/11/20 0241     Place INOCENCIO hose  Until discontinued      12/11/20 0241 "     Place sequential compression device  Until discontinued      12/11/20 1371              Alisa Xiong, [facility], PA-C  Hospital Medicine Department  Staff:  Dr. Lee

## 2020-12-11 NOTE — NURSING
Per report from E.D nurse pt slipped and fell in the E.D while going to the bedside toilet and landed on her sacrum. He stated they administered a dose of toradol and were not going to xray her at that time. When pt arrived to room 14666 she c/o pain 10/10. She states she has baseline back pain at home and takes tylenol normally but it is a lot worse now. She also states she is having new tingling to her feet that began throughout the night. Med team B juventino   Spoke with Dr Chahal at 0752, she states she will order an Xray

## 2020-12-11 NOTE — ED PROVIDER NOTES
Encounter Date: 12/10/2020       History     Chief Complaint   Patient presents with    COVID-19 Concerns     pt tested positive for COVID on 12/3/2020. pt c/o fever, fatigue, dehydration, chest pain, and lower back pain.      36 y/o F presents to the ED complaining of worsening cough over the the past week. She tested positive for covid 7 days ago, given decadron, and felt better initially. Then began worsening with cough, pleuritic chest pain, and back pain. She also complains of nausea, vomiting, and diarrhea. She reports subjective fever and generalized weakness.  She reports high levels of anxiety and hopelessness related to her covid diagnosis.        Review of patient's allergies indicates:   Allergen Reactions    Penicillins      Past Medical History:   Diagnosis Date    Essential hypertension 2020     Past Surgical History:   Procedure Laterality Date     SECTION, CLASSIC      CHOLECYSTECTOMY      TUBAL LIGATION       History reviewed. No pertinent family history.  Social History     Tobacco Use    Smoking status: Never Smoker   Substance Use Topics    Alcohol use: Yes    Drug use: No     Review of Systems   Constitutional: Positive for appetite change and fever.   HENT: Negative for congestion and trouble swallowing.    Respiratory: Positive for cough. Negative for shortness of breath.    Cardiovascular: Positive for chest pain. Negative for palpitations and leg swelling.   Gastrointestinal: Positive for diarrhea, nausea and vomiting. Negative for abdominal distention and abdominal pain.   Genitourinary: Negative for difficulty urinating.   Musculoskeletal: Positive for back pain. Negative for myalgias.   Neurological: Positive for weakness. Negative for dizziness.   Psychiatric/Behavioral: Negative for agitation and confusion. The patient is nervous/anxious.    All other systems reviewed and are negative.      Physical Exam     Initial Vitals [12/10/20 2139]   BP Pulse Resp Temp  SpO2   106/66 (!) 116 18 100.3 °F (37.9 °C) (!) 94 %      MAP       --         Physical Exam    Nursing note and vitals reviewed.  Constitutional: She appears well-developed and well-nourished. She is not diaphoretic. She appears distressed (tearful, anxious, actively coughing).   HENT:   Head: Normocephalic and atraumatic.   Right Ear: External ear normal.   Left Ear: External ear normal.   Eyes: Conjunctivae are normal.   Cardiovascular: Regular rhythm, normal heart sounds and intact distal pulses. Tachycardia present.    No murmur heard.  Pulmonary/Chest: Breath sounds normal. No respiratory distress. She has no wheezes. She has no rhonchi. She has no rales.   Lung exam difficult due to habitus   Abdominal: Soft. She exhibits no distension. There is no abdominal tenderness. There is no rebound and no guarding.   Musculoskeletal: No edema.   Lymphadenopathy:     She has no cervical adenopathy.   Neurological: She is alert and oriented to person, place, and time. GCS score is 15. GCS eye subscore is 4. GCS verbal subscore is 5. GCS motor subscore is 6.   Able to ambulate but does not put much effort into it.   Skin: Skin is warm and dry.   Psychiatric: Her mood appears anxious. She exhibits a depressed mood.         ED Course   Procedures  Labs Reviewed   CBC W/ AUTO DIFFERENTIAL - Abnormal; Notable for the following components:       Result Value    RBC 6.28 (*)     MCV 75 (*)     MCH 24.4 (*)     All other components within normal limits   C-REACTIVE PROTEIN - Abnormal; Notable for the following components:    .2 (*)     All other components within normal limits   LACTATE DEHYDROGENASE - Abnormal; Notable for the following components:     (*)     All other components within normal limits   LACTIC ACID, PLASMA - Abnormal; Notable for the following components:    Lactate (Lactic Acid) 2.7 (*)     All other components within normal limits   CULTURE, RESPIRATORY   CK   TROPONIN I   POCT URINE PREGNANCY         ECG Results          EKG 12-lead (Final result)  Result time 12/11/20 15:51:01    Final result by Interface, Lab In [facility] (12/11/20 15:51:01)                 Narrative:    Test Reason : R07.9,    Vent. Rate : 103 BPM     Atrial Rate : 103 BPM     P-R Int : 166 ms          QRS Dur : 090 ms      QT Int : 336 ms       P-R-T Axes : 075 084 047 degrees     QTc Int : 440 ms    Sinus tachycardia  Otherwise normal ECG  When compared with ECG of 10-DEC-2020 21:53,  No significant change was found  Confirmed by SARAH MUÑOZ MD ([id number]) on 12/11/2020 3:50:50 PM    Referred By: [email or web address]   SELF           Confirmed By:SARAH MUÑOZ MD                             EKG 12-lead (Final result)  Result time 12/11/20 15:47:54    Final result by Interface, Lab In [facility] (12/11/20 15:47:54)                 Narrative:    Test Reason : U07.1,J06.9,    Vent. Rate : 110 BPM     Atrial Rate : 110 BPM     P-R Int : 156 ms          QRS Dur : 084 ms      QT Int : 310 ms       P-R-T Axes : 065 092 021 degrees     QTc Int : 419 ms    Sinus tachycardia  Right atrial enlargement  Rightward axis  Borderline Abnormal ECG  When compared with ECG of 09-SEP-2020 20:18,  Nonspecific T wave abnormality no longer evident in Lateral leads  Confirmed by SARAH MUÑOZ MD ([id number]) on 12/11/2020 3:47:45 PM    Referred By: [email or web address]   SELF           Confirmed By:SARAH MUÑOZ MD                            Imaging Results          X-Ray Chest AP Portable (Final result)  Result time 12/11/20 00:41:50    Final result by Sahara Lee MD (12/11/20 00:41:50)                 Impression:      As above described.      Electronically signed by: Sahara Lee  Date:    12/11/2020  Time:    00:41             Narrative:    EXAMINATION:  AP PORTABLE CHEST    CLINICAL HISTORY:  covid;    TECHNIQUE:  AP portable chest radiograph was submitted.    COMPARISON:  12/03/2020    FINDINGS:  AP portable chest radiograph demonstrates mild enlargement of the cardiac  silhouette.  There is subtle asymmetric density in the left retrocardiac region.  Findings may represent atelectasis or infiltrate.  There is no pneumothorax or pleural effusion.  Cholecystectomy clips are present.                                 Medical Decision Making:   History:   Old Medical Records: I decided to obtain old medical records.  Old Records Summarized: other records.       <> Summary of Records: Diagnosed with COVID-19 on 12/03/2020  Initial Assessment:   36 y/o F presents with cough, chest pain, and back pain associated with covid dx 7 days ago. She is hypoxic upon ambulation. Will admit.  Differential Diagnosis:   Covid, flu, pneumonia, URI, bronchitis  Independently Interpreted Test(s):   I have ordered and independently interpreted X-rays - see summary below.       <> Summary of X-Ray Reading(s): CXR with bilateral infiltrates on my read.  Clinical Tests:   Lab Tests: Ordered and Reviewed  Radiological Study: Ordered and Reviewed  Medical Tests: Ordered and Reviewed  Other:   I have discussed this case with another health care provider.       <> Summary of the Discussion: Hospital medicine       APC / Resident Notes:   Patient seen and examined, will give decadron, obtain basic labs, crp, ck, trop, flu swab.    0103 Will admit for hypoxia 2/2 covid. Started abx for patchy infiltrates on cxr.         Attending Attestation:   Physician Attestation Statement for Resident:  As the supervising MD   Physician Attestation Statement: I have personally seen and examined this patient.   I agree with the above history. -:   As the supervising MD I agree with the above PE.    As the supervising MD I agree with the above treatment, course, plan, and disposition.   -:     Tachycardic.  Afebrile.  Here with worsening COVID-19.  Has had decreased p.o. intake.  Appears very depressed.  Does not seem to have much interest in getting better.  However she does not appear to be acute threat to self.  She is not  suicidal.  I personally ambulated the patient to obtain O2 sats.  They dropped to 91% when she sat back down.  However she only briefly walk around as she did not wish to put in the effort.  Labs obtained.  1 L normal saline fluid bolus given.  Decadron IV given.  CXR obtained as well.    CXR with bilateral infiltrates.  Rocephin and azithromycin given as well.  Per the nurse, patient fell when trying to use the bedside commode.  Complaining of tailbone pain.  Given Tylenol and Toradol.    Discussed with hospital medicine who admitted patient.        I have reviewed and agree with the residents interpretation of the following: lab data and x-rays.  I have reviewed the following: old records at this facility.                    ED Course as of Dec 11 1742   Thu Dec 10, 2020   2219 SpO2(!): 94 % [DE]      ED Course User Index  [DE] Valeri Swanson MD            Clinical Impression:       ICD-10-CM ICD-9-CM   1. COVID-19 virus infection  U07.1 079.89   2. Chest pain  R07.9 786.50   3. Acute respiratory disease due to COVID-19 virus  U07.1 465.9    J06.9 079.89   4. Hypoxia  R09.02 799.02                      Disposition:   Disposition: Placed in Observation  Condition: Stable     ED Disposition Condition    Observation                             Valeri Swanson MD  Resident  12/11/20 0107       Valeri Swanson MD  Resident  12/11/20 0112       Philippe Roman MD  12/11/20 1742

## 2020-12-11 NOTE — ED TRIAGE NOTES
Pt presents to ED tonight with SOB and fatigue. Pt states  works at [employer] and a few students tested positive for COVID. Pt states she was tested a few days ago and was positive. Pt states that tonight she just became more fatigued and SOB. Pt denies C/P.

## 2020-12-11 NOTE — PROGRESS NOTES
Hospital Medicine  Progress Note  [facility]      Patient Name: Aniket Ac  MRN:  [id number]  Hospital Medicine Team: [facility] [place], [place] HOSP MED B Katheryn Chahal MD  Date of Admission:  12/10/2020     Length of Stay:  LOS: 0 days     Principal Problem:    Shortness of breath    Summary: Patient is a 35 year old F with a h/o HTN who presented with worsening SOB and cough.  She was diagnosed with COVID on 12/3/2020 after experiencing cough and HA in the setting of her SO's positive COVID test.  On initial diagnosis, she received steroids and was discharged with an inhaler.  She states she initially felt better, but over the past few days symptoms have worsened.  She states the albuterol inhaler has not helped, but made her have palpitations.  She complains of pleuritic chest pain, back pain, N/V/D, fatigue, fever/chills, decreased appetite, decreased sense of smell and taste.  She is tearful on exam. Patient admitted for evaluation of acute respiratory failure with hypoxia secondary to COVID-19.  Patient started on dexamethasone, remdesivir, azithromycin, ceftriaxone, breathing treatments.  Patient reports she suffered a fall while walking to the bathroom in the ED, and since then has had acute on chronic worsening back pain.    Interval History:     Patient noted to be hemodynamically stable. Overnight, remained stable on 1 L O2 via nasal cannula but saturing around 92-92% on it.  Patient continues to complain of some shortness of breath, especially dyspnea on exertion.  Patient continues to have cough with minimal sputum production.  Appetite has been low, but patient has been able to tolerate p.o. intake without any nausea, vomiting.  Pt would benefit from ongoing tx with remdesivir, steroids, CAP abx. Encourage use of incentive spirometer, albuterol inhaler, p.r.n. antitussives.  Attempt to get out of bed, sit up in chair and walk around the room if possible.  Complains of lower back pain, Order  x-ray hip/pelvis. Her numbness and tingling has now resolved.     Review of Systems:  ROS (Positive in Bold, otherwise negative)  Constitutional: fever, chills, night sweats, malaise, weakness  CV: chest pain, edema, palpitations  Resp: SOB, cough, sputum production  GI: changes in appetite, NVDC, pain, melena, hematochezia, GERD, hematemesis  : Dysuria, hematuria, urinary urgency, frequency  MSK: arthralgia/myalgia, joint swelling  Neuro/Psych: anxiety, depression    Inpatient Medications:    Current Facility-Administered Medications:     acetaminophen tablet 650 mg, 650 mg, Oral, Q4H PRN, Alisa Xiong PA-C    albuterol inhaler 2 puff, 2 puff, Inhalation, Q6H PRN, Katheryn Chahal MD    ascorbic acid (vitamin C) tablet 500 mg, 500 mg, Oral, BID, Alisa Xiong PA-C    [START ON 12/12/2020] azithromycin tablet 500 mg, 500 mg, Oral, Q24H, Katheryn Chahal MD    benzonatate capsule 100 mg, 100 mg, Oral, TID PRN, Alisa Xiong PA-C    [START ON 12/12/2020] cefTRIAXone injection 1 g, 1 g, Intravenous, QHS, Alisa Xiong PA-C    cetirizine tablet 10 mg, 10 mg, Oral, Daily, Alisa Xiong PA-C    dexAMETHasone tablet 6 mg, 6 mg, Oral, Daily, Alisa Xiong PA-C    dextromethorphan-guaifenesin  mg/5 ml liquid 5 mL, 5 mL, Oral, Q4H PRN, Katheryn Chahal MD    dextrose 50% injection 12.5 g, 12.5 g, Intravenous, PRN, Alisa Xiong PA-C    dextrose 50% injection 25 g, 25 g, Intravenous, PRN, Alisa Xiong PA-C    enoxaparin injection 40 mg, 40 mg, Subcutaneous, Q24H, Alisa Xiong PA-C    glucagon (human recombinant) injection 1 mg, 1 mg, Intramuscular, PRN, Alisa Xiong PA-C    glucose chewable tablet 16 g, 16 g, Oral, PRN, Alisa Xiong PA-C    glucose chewable tablet 24 g, 24 g, Oral, PRN, Alisa Xiong PA-C    hydroCHLOROthiazide tablet 12.5 mg, 12.5 mg, Oral, Daily, Alisa Xiong PA-C    hydrOXYzine HCL  "tablet 25 mg, 25 mg, Oral, TID PRN, Alisa Xiong PA-C, 25 mg at 12/11/20 0436    ketorolac injection 15 mg, 15 mg, Intravenous, Q6H PRN, Alisa Xiong PA-C    lidocaine 5 % patch 1 patch, 1 patch, Transdermal, Q24H, Alisa Xiong PA-C, 1 patch at 12/11/20 0435    loperamide capsule 2 mg, 2 mg, Oral, Q6H PRN, Alisa Xiong PA-C    melatonin tablet 6 mg, 6 mg, Oral, Nightly PRN, Alisa Xiong PA-C, 6 mg at 12/11/20 0435    multivitamin tablet, 1 tablet, Oral, Daily, Alisa Xiong PA-C    ondansetron injection 4 mg, 4 mg, Intravenous, Q8H PRN, Alisa Xiong PA-C    promethazine (PHENERGAN) 6.25 mg in dextrose 5 % 50 mL IVPB, 6.25 mg, Intravenous, Q6H PRN, Alisa iXong PA-C    remdesivir 200 mg in sodium chloride 0.9% 250 mL infusion, 200 mg, Intravenous, Q24H **FOLLOWED BY** [START ON 12/12/2020] remdesivir 100 mg in sodium chloride 0.9% 250 mL infusion, 100 mg, Intravenous, Q24H, Katheryn Chahal MD    sodium chloride 0.9% flush 10 mL, 10 mL, Intravenous, PRN, Alisa Xiong PA-C    vitamin D 1000 units tablet 1,000 Units, 1,000 Units, Oral, Daily, Alisa Xiong PA-C      Physical Exam:    No intake or output data in the 24 hours ending 12/11/20 0847  Wt Readings from Last 3 Encounters:   12/11/20 112.3 kg (247 lb 9.2 oz)   12/03/20 113.4 kg (250 lb)   09/09/20 113.4 kg (250 lb)       /84   Pulse 78   Temp 97.8 °F (36.6 °C) (Oral)   Resp 20   Ht 5' 9" (1.753 m)   Wt 112.3 kg (247 lb 9.2 oz)   SpO2 96%   Breastfeeding No   BMI 36.56 kg/m²     GEN: NAD, conversant  Resp: coarse bilateral breath sounds, no wheezes or rales, normal work of breathing   CV: RRR, no m/r/g, no edema  GI: soft, NTND  Skin: no rash    Laboratory:  Lab Results   Component Value Date    [id number] Positive (A) 12/03/2020       Recent Labs   Lab 12/11/20  0018   WBC 4.95   LYMPH 31.9  1.6   HGB 15.3   HCT 46.8        No results for input(s): " NA, K, CL, CO2, BUN, CREATININE, GLU, CALCIUM, MG, PHOS, LIPASE, AMYLASE in the last 168 hours.  No results for input(s): ALKPHOS, ALT, AST, ALBUMIN, PROT, BILITOT, INR in the last 168 hours.     Recent Labs     12/11/20  0018   FERRITIN 1,900*   .2*   *   TROPONINI 0.009       All labs within the last 24 hours were reviewed.     Microbiology:  Microbiology Results (last 7 days)     Procedure Component Value Units Date/Time    Culture, Respiratory with Gram Stain [[id number]]     Order Status: No result Specimen: Sputum, Expectorated     Influenza A & B by Molecular [[id number]]     Order Status: No result Specimen: Nasopharyngeal Swab     Blood culture (site 1) [[id number]]     Order Status: Sent Specimen: Blood     Blood culture (site 2) [[id number]]     Order Status: Sent Specimen: Blood             Imaging  ECG Results          EKG 12-lead (In process)  Result time 12/11/20 07:46:05    In process by Interface, Lab In [facility] (12/11/20 07:46:05)                 Narrative:    Test Reason : R07.9,    Vent. Rate : 103 BPM     Atrial Rate : 103 BPM     P-R Int : 166 ms          QRS Dur : 090 ms      QT Int : 336 ms       P-R-T Axes : 075 084 047 degrees     QTc Int : 440 ms    Sinus tachycardia  Otherwise normal ECG  When compared with ECG of 10-DEC-2020 21:53,  No significant change was found    Referred By: [email or web address]   SELF           Confirmed By:                              EKG 12-lead (In process)  Result time 12/11/20 08:23:39    In process by Interface, Lab In [facility] (12/11/20 08:23:39)                 Narrative:    Test Reason : U07.1,J06.9,    Vent. Rate : 110 BPM     Atrial Rate : 110 BPM     P-R Int : 156 ms          QRS Dur : 084 ms      QT Int : 310 ms       P-R-T Axes : 065 092 021 degrees     QTc Int : 419 ms    Sinus tachycardia  Right atrial enlargement  Rightward axis  Borderline Abnormal ECG  When compared with ECG of 09-SEP-2020 20:18,  Nonspecific T wave abnormality no longer evident  in Lateral leads    Referred By: AAAREFERR   SELF           Confirmed By:                               No results found for this or any previous visit.    X-Ray Chest AP Portable  Narrative: EXAMINATION:  AP PORTABLE CHEST    CLINICAL HISTORY:  covid;    TECHNIQUE:  AP portable chest radiograph was submitted.    COMPARISON:  12/03/2020    FINDINGS:  AP portable chest radiograph demonstrates mild enlargement of the cardiac silhouette.  There is subtle asymmetric density in the left retrocardiac region.  Findings may represent atelectasis or infiltrate.  There is no pneumothorax or pleural effusion.  Cholecystectomy clips are present.  Impression: As above described.    Electronically signed by: Sahara Lee  Date:    12/11/2020  Time:    00:41      All imaging within the last 24 hours was reviewed.     Assessment and Plan:    Active Hospital Problems    Diagnosis  POA    COVID-19 virus infection [U07.1]  Yes    Essential hypertension [I10]  Yes     Chronic      Resolved Hospital Problems   No resolved problems to display.       COVID-19 Virus Infection:  Viral Pneumonia due to COVID-19:  -Pt tested for COVID-19 and noted to be positive on 12/3  -Isolation: Airborne/Droplet. Surgical mask on patient. Notify Infection Control  -Management: per [facility] COVID Treatment Protocol (4/15/20)  -Monitoring: Telemetry & Continuous Pulse Oximetry  -Antibiotics: started on ceftriaxone 1g Q24h x 5 days and azithromycin 500mg po x1, then 250mg po daily x 4 days  -Nutrition:    -Multivitamin PO daily   -Add Boost supplement   -Vitamin D 1000IU daily if deficient   -Ascorbic acid 500mg PO bid  -Supportive Care:   -Acetaminophen 650mg PO Q6hr PRN fever/headache   -Loperamide PRN viral diarrhea   -Robitussin, tessalon perls for cough   -IVF if indicated, restrictive strategy preferred, no maintenance IV if able   -Investigational Therapies:   -Due to hypoxia, pt started on dexamethasone 6mg PO daily up to 10 days or until pt is  discharged from hospital (whichever is sooner)   -Pt meets criteria for remdesivir. Pt provided verbal consent to start this medication and it being ordered/dosed per ID.     Acute Hypoxemic Respiratory Failure:  -currently requiring 1 L O2 via nasal cannula  -RT consult via Respiratory Communication for COVID Protocols  -If wheezing, albuterol INH Q6h scheduled & PRN  -Incentive Spirometer Q4h  -Flutter Valve Q4h  -Continuous pulse oximetry; titrate oxygen to keep sats between 92-96%  -Wean off O2 as tolerated    -Supplemental O2 via LFNC, VentiMask, or HFNC (see Respiratory Support Oxygen Therapies)  -Proning Protocol if patient is a candidate with GCS >13 and able to self-prone (see HM Proning Protocol)  -If deterioration, may warrant trial of NIPPV and transfer to [facility] pressure room or immediate ICU consult    Hypertension:  -stable  -Continue PTA  hydrochlorothiazide 12.5 mg p.o. daily  -monitor vitals q4h  -SBP goal of <160 in hospital    Fall/back pain:  -reports she suffered a mechanical fall while walking to the bathroom in the ED and broke the fall with her hip  -will order x-ray pelvis/hip  -consider MRI if symptoms persist  -pain control with lidocaine patch, Tylenol, Toradol p.r.n.    Goals of care, counseling/discussion  -Reviewed the typical clinical course of COVID19 with pt, including the potential for acute decompensation requiring intubation and mechanical ventilation  -Discussed again as part of routine daily evaluation, patient/POA maintains code status of full      VTE High Risk Prophylaxis: enoxaparin 40mg sq QHS @ 2100 (bundled care) if GFR >30    Dispo: DC home once medically ready.     Subsequent Hospital Care:   Level 3 53830 Total visit time was 35 minutes or greater with greater than 50% of time spent in counseling and coordination of care.     Katheryn Chahal MD  12/11/2020   Department of Hospital medicine

## 2020-12-12 PROBLEM — M54.9 BACK PAIN: Status: ACTIVE | Noted: 2020-12-12

## 2020-12-12 PROBLEM — E66.09 CLASS 2 OBESITY DUE TO EXCESS CALORIES WITH BODY MASS INDEX (BMI) OF 36.0 TO 36.9 IN ADULT: Status: ACTIVE | Noted: 2019-01-15

## 2020-12-12 LAB
ALBUMIN SERPL BCP-MCNC: 3.2 G/DL (ref 3.5–5.2)
ALP SERPL-CCNC: 60 U/L (ref 55–135)
ALT SERPL W/O P-5'-P-CCNC: 42 U/L (ref 10–44)
ANION GAP SERPL CALC-SCNC: 16 MMOL/L (ref 8–16)
AST SERPL-CCNC: 46 U/L (ref 10–40)
BILIRUB SERPL-MCNC: 0.3 MG/DL (ref 0.1–1)
BUN SERPL-MCNC: 22 MG/DL (ref 6–20)
CALCIUM SERPL-MCNC: 8.7 MG/DL (ref 8.7–10.5)
CHLORIDE SERPL-SCNC: 100 MMOL/L (ref 95–110)
CO2 SERPL-SCNC: 22 MMOL/L (ref 23–29)
CREAT SERPL-MCNC: 0.8 MG/DL (ref 0.5–1.4)
EST. GFR  (AFRICAN AMERICAN): >60 ML/MIN/1.73 M^2
EST. GFR  (NON AFRICAN AMERICAN): >60 ML/MIN/1.73 M^2
GLUCOSE SERPL-MCNC: 107 MG/DL (ref 70–110)
MAGNESIUM SERPL-MCNC: 3.2 MG/DL (ref 1.6–2.6)
PHOSPHATE SERPL-MCNC: 4.5 MG/DL (ref 2.7–4.5)
POTASSIUM SERPL-SCNC: 3.3 MMOL/L (ref 3.5–5.1)
PROT SERPL-MCNC: 7.8 G/DL (ref 6–8.4)
SODIUM SERPL-SCNC: 138 MMOL/L (ref 136–145)

## 2020-12-12 PROCEDURE — 99900035 HC TECH TIME PER 15 MIN (STAT)

## 2020-12-12 PROCEDURE — 83735 ASSAY OF MAGNESIUM: CPT

## 2020-12-12 PROCEDURE — 80053 COMPREHEN METABOLIC PANEL: CPT

## 2020-12-12 PROCEDURE — 27000221 HC OXYGEN, UP TO 24 HOURS

## 2020-12-12 PROCEDURE — 25000003 PHARM REV CODE 250: Performed by: INTERNAL MEDICINE

## 2020-12-12 PROCEDURE — G0378 HOSPITAL OBSERVATION PER HR: HCPCS

## 2020-12-12 PROCEDURE — 63700000 PHARM REV CODE 250 ALT 637 W/O HCPCS: Performed by: HOSPITALIST

## 2020-12-12 PROCEDURE — 63600175 PHARM REV CODE 636 W HCPCS: Performed by: PHYSICIAN ASSISTANT

## 2020-12-12 PROCEDURE — 25000003 PHARM REV CODE 250: Performed by: PHYSICIAN ASSISTANT

## 2020-12-12 PROCEDURE — 94761 N-INVAS EAR/PLS OXIMETRY MLT: CPT

## 2020-12-12 PROCEDURE — 99226 PR SUBSEQUENT OBSERVATION CARE,LEVEL III: CPT | Mod: 95,,, | Performed by: INTERNAL MEDICINE

## 2020-12-12 PROCEDURE — 84100 ASSAY OF PHOSPHORUS: CPT

## 2020-12-12 PROCEDURE — 94664 DEMO&/EVAL PT USE INHALER: CPT

## 2020-12-12 PROCEDURE — 36415 COLL VENOUS BLD VENIPUNCTURE: CPT

## 2020-12-12 PROCEDURE — 20600001 HC STEP DOWN PRIVATE ROOM

## 2020-12-12 PROCEDURE — 99226 PR SUBSEQUENT OBSERVATION CARE,LEVEL III: ICD-10-PCS | Mod: 95,,, | Performed by: INTERNAL MEDICINE

## 2020-12-12 PROCEDURE — 25000003 PHARM REV CODE 250: Performed by: HOSPITALIST

## 2020-12-12 RX ORDER — TRAMADOL HYDROCHLORIDE 50 MG/1
50 TABLET ORAL EVERY 6 HOURS PRN
Status: DISCONTINUED | OUTPATIENT
Start: 2020-12-12 | End: 2020-12-14

## 2020-12-12 RX ORDER — PANTOPRAZOLE SODIUM 40 MG/1
40 TABLET, DELAYED RELEASE ORAL DAILY
Status: DISCONTINUED | OUTPATIENT
Start: 2020-12-12 | End: 2020-12-16 | Stop reason: HOSPADM

## 2020-12-12 RX ORDER — METHOCARBAMOL 500 MG/1
500 TABLET, FILM COATED ORAL 4 TIMES DAILY
Status: DISCONTINUED | OUTPATIENT
Start: 2020-12-12 | End: 2020-12-16 | Stop reason: HOSPADM

## 2020-12-12 RX ADMIN — CETIRIZINE HYDROCHLORIDE 10 MG: 10 TABLET, FILM COATED ORAL at 08:12

## 2020-12-12 RX ADMIN — METHOCARBAMOL TABLETS 500 MG: 500 TABLET, COATED ORAL at 12:12

## 2020-12-12 RX ADMIN — CEFTRIAXONE SODIUM 1 G: 1 INJECTION, POWDER, FOR SOLUTION INTRAMUSCULAR; INTRAVENOUS at 08:12

## 2020-12-12 RX ADMIN — DEXAMETHASONE 6 MG: 4 TABLET ORAL at 12:12

## 2020-12-12 RX ADMIN — OXYCODONE HYDROCHLORIDE AND ACETAMINOPHEN 500 MG: 500 TABLET ORAL at 08:12

## 2020-12-12 RX ADMIN — METHOCARBAMOL TABLETS 500 MG: 500 TABLET, COATED ORAL at 04:12

## 2020-12-12 RX ADMIN — METHOCARBAMOL TABLETS 500 MG: 500 TABLET, COATED ORAL at 08:12

## 2020-12-12 RX ADMIN — ENOXAPARIN SODIUM 40 MG: 40 INJECTION SUBCUTANEOUS at 08:12

## 2020-12-12 RX ADMIN — Medication 1 TABLET: at 08:12

## 2020-12-12 RX ADMIN — REMDESIVIR 100 MG: 100 INJECTION, POWDER, LYOPHILIZED, FOR SOLUTION INTRAVENOUS at 04:12

## 2020-12-12 RX ADMIN — PANTOPRAZOLE SODIUM 40 MG: 40 TABLET, DELAYED RELEASE ORAL at 04:12

## 2020-12-12 RX ADMIN — HYDROCHLOROTHIAZIDE 12.5 MG: 12.5 TABLET ORAL at 08:12

## 2020-12-12 RX ADMIN — AZITHROMYCIN MONOHYDRATE 500 MG: 250 TABLET ORAL at 06:12

## 2020-12-12 RX ADMIN — KETOROLAC TROMETHAMINE 15 MG: 30 INJECTION, SOLUTION INTRAMUSCULAR at 04:12

## 2020-12-12 RX ADMIN — CHOLECALCIFEROL (VITAMIN D3) 25 MCG (1,000 UNIT) TABLET 1000 UNITS: TABLET at 08:12

## 2020-12-12 RX ADMIN — CEFTRIAXONE SODIUM 0.5 G: 1 INJECTION, POWDER, FOR SOLUTION INTRAMUSCULAR; INTRAVENOUS at 03:12

## 2020-12-12 RX ADMIN — TRAMADOL HYDROCHLORIDE 50 MG: 50 TABLET, FILM COATED ORAL at 10:12

## 2020-12-12 NOTE — CONSULTS
[facility] Medicine  Telemedicine Consult Note    Patient Name: Aniket Ac  MRN: [id number]  Admission Date: 12/10/2020  Hospital Length of Stay: 0 days  Attending Physician: Katheryn Chahal MD   Primary Care Provider: LINDA García           Aniket Ac has been accepted for transfer to [facility] and will be followed through telemedicine services beginning 12/12/20 at 7 AM.          Shirley Ta MD  Department of Hospital Medicine   [facility]

## 2020-12-12 NOTE — PLAN OF CARE
Pt AAOx4. Pt has called for assistance before exiting the bed. No new inpatient falls while on unit.Pt continues to receive Remdesivir for tx.

## 2020-12-12 NOTE — PROGRESS NOTES
[facility] - ICU 15 [facility] Medicine  Telemedicine Progress Note    Patient Name: Aniket Ac  MRN: [id number]  Patient Class: OP- Observation   Admission Date: 12/10/2020  Length of Stay: 0 days  Attending Physician: Shirley Ta MD  Primary Care Provider: ESTEFANÍA GarcíaKOFFI    [facility] Medicine Team: [facility] VIRTUAL TEAM 10 Shirley Ta MD  Virtual Telemedicine Progress Note  Patient was transferred to the telemedicine service on: 12/12/2020  Chief complaint: <principal problem not specified>  The patient location is: [id number]  The patient arrived at: 12/10/2020  9:47 PM  Start time: 1517  End time:  1526  Total time spent with patient: 9 min  Present with the patient at the time of the telemed/virtual assessment: n/a  I have assessed findings virtually using a telemedicine platform and with assistance of the bedside nurse or telemedicine presenter.  The attending portion of this evaluation, treatment, and documentation was performed per Shirley Ta MD via audiovisual.    Subjective:     Admission CC:   Chief Complaint   Patient presents with    COVID-19 Concerns     pt tested positive for COVID on 12/3/2020. pt c/o fever, fatigue, dehydration, chest pain, and lower back pain.      Follow up visit for: COVID-19 virus infection    Interval History / Events Overnight:   The patient is able to provide adequate history. Additional history was obtained from past medical records and chart review. No significant events reported by Nursing.  Patient complains of back pain. Symptoms have been unchanged since yesterday. Associated symptoms include: pleuritic chest pain and fatigue. Symptoms are stable. Alleviating factors include: rest.  SpO2 92% on 1 L NC    Data reviewed 12/12/2020: Lab test(s) reviewed: hypokalemia    Review of Systems   Constitutional: Positive for fatigue. Negative for fever.   Respiratory: Positive for shortness of breath.    Cardiovascular: Positive for  chest pain.     Objective:     Vital Signs (Most Recent):  Temp: 98.1 °F (36.7 °C) (12/12/20 0745)  Pulse: 76 (12/12/20 1500)  Resp: 18 (12/12/20 1017)  BP: 117/73 (12/12/20 0745)  SpO2: (!) 92 % (12/12/20 0745) Vital Signs (24h Range):  Temp:  [97.7 °F (36.5 °C)-98.6 °F (37 °C)] 98.1 °F (36.7 °C)  Pulse:  [76-92] 76  Resp:  [18-20] 18  SpO2:  [91 %-98 %] 92 %  BP: ()/(60-77) 117/73     Weight: 113.2 kg (249 lb 9 oz)  Body mass index is 36.85 kg/m².    Intake/Output Summary (Last 24 hours) at 12/12/2020 1527  Last data filed at 12/12/2020 1017  Gross per 24 hour   Intake 340 ml   Output 850 ml   Net -510 ml      Physical Exam  Constitutional:       General: She is not in acute distress.     Appearance: Normal appearance. She is not diaphoretic.   Eyes:      General: Lids are normal. No scleral icterus.        Right eye: No discharge.         Left eye: No discharge.      Conjunctiva/sclera: Conjunctivae normal.   Neck:      Trachea: Phonation normal.   Cardiovascular:      Rate and Rhythm: Normal rate.   Pulmonary:      Effort: Pulmonary effort is normal. No tachypnea, accessory muscle usage or respiratory distress.   Abdominal:      General: There is no distension.   Neurological:      Mental Status: She is alert. She is not disoriented.   Psychiatric:         Attention and Perception: Attention normal.         Mood and Affect: Affect normal.         Behavior: Behavior is cooperative.         Significant Labs:   .  Recent Labs   Lab 12/11/20  0018   WBC 4.95   HGB 15.3   HCT 46.8        Recent Labs   Lab 12/11/20  0018   GRAN 62.0  3.1   LYMPH 31.9  1.6   MONO 5.3  0.3   EOS 0.0     Recent Labs   Lab 12/11/20  0901 12/12/20  0622   * 138   K 3.5 3.3*   CL 96 100   CO2 23 22*   BUN 15 22*   CREATININE 0.9 0.8   * 107   CALCIUM 8.7 8.7   ALBUMIN 3.1* 3.2*   MG  --  3.2*   PHOS  --  4.5     Recent Labs   Lab 12/11/20  0901 12/11/20  0902 12/12/20  0622   ALKPHOS 51*  --  60   ALT 44  --  42    AST 56*  --  46*   PROT 7.5  --  7.8   BILITOT 0.4  --  0.3   INR  --  0.9  --      Procalcitonin (ng/mL)   Date Value   12/11/2020 0.22     Lactate (Lactic Acid) (mmol/L)   Date Value   12/11/2020 0.7   12/11/2020 2.7 (H)     BNP (pg/mL)   Date Value   12/11/2020 <10   09/09/2020 20   03/26/2020 <10     CRP (mg/L)   Date Value   12/11/2020 126.2 (H)     Sed Rate (mm/Hr)   Date Value   12/11/2020 89 (H)     D-Dimer (mg/L FEU)   Date Value   12/11/2020 0.64 (H)   03/26/2020 0.31     Ferritin (ng/mL)   Date Value   12/11/2020 1,900 (H)     LD (U/L)   Date Value   12/11/2020 508 (H)     Troponin I (ng/mL)   Date Value   12/11/2020 0.009   09/09/2020 <0.006   03/26/2020 <0.006     CPK (U/L)   Date Value   12/11/2020 157     Results for orders placed or performed during the hospital encounter of 12/10/20   Vitamin D   Result Value Ref Range    Vit D, 25-Hydroxy 29 (L) 30 - 96 ng/mL     SARS-CoV2 (COVID-19) Qualitative PCR (no units)   Date Value   03/25/2020 Not Detected     POC Rapid COVID (no units)   Date Value   12/03/2020 Positive (A)     ECG Results          EKG 12-lead (Final result)  Result time 12/11/20 15:51:01    Final result by Interface, Lab In [facility] (12/11/20 15:51:01)                 Narrative:    Test Reason : R07.9,    Vent. Rate : 103 BPM     Atrial Rate : 103 BPM     P-R Int : 166 ms          QRS Dur : 090 ms      QT Int : 336 ms       P-R-T Axes : 075 084 047 degrees     QTc Int : 440 ms    Sinus tachycardia  Otherwise normal ECG  When compared with ECG of 10-DEC-2020 21:53,  No significant change was found  Confirmed by SARAH MUÑOZ MD ([id number]) on 12/11/2020 3:50:50 PM    Referred By: [email or web address]   SELF           Confirmed By:SARAH MUÑOZ MD                             EKG 12-lead (Final result)  Result time 12/11/20 15:47:54    Final result by Interface, Lab In [facility] (12/11/20 15:47:54)                 Narrative:    Test Reason : U07.1,J06.9,    Vent. Rate : 110 BPM     Atrial Rate : 110 BPM      P-R Int : 156 ms          QRS Dur : 084 ms      QT Int : 310 ms       P-R-T Axes : 065 092 021 degrees     QTc Int : 419 ms    Sinus tachycardia  Right atrial enlargement  Rightward axis  Borderline Abnormal ECG  When compared with ECG of 09-SEP-2020 20:18,  Nonspecific T wave abnormality no longer evident in Lateral leads  Confirmed by SARAH MUÑOZ MD ([id number]) on 12/11/2020 3:47:45 PM    Referred By: [email or web address]   SELF           Confirmed By:SARAH MUÑOZ MD                            X-Ray Hips Bilateral 2 View Inc AP Pelvis  Narrative: EXAMINATION:  XR HIPS BILATERAL 2 VIEW INCL AP PELVIS    CLINICAL HISTORY:  fall, lower back pain;    TECHNIQUE:  AP view of the pelvis and frogleg lateral views of both hips were performed.    COMPARISON:  None.    FINDINGS:  No acute fractures.  No malalignment.  Preserved right and left SI joints and hip joint spaces.  Preserved right and left femoral head contours.  Impression: No fracture.    Electronically signed by: Gurmeet Lozano  Date:    12/11/2020  Time:    11:46  X-Ray Chest AP Portable  Narrative: EXAMINATION:  AP PORTABLE CHEST    CLINICAL HISTORY:  covid;    TECHNIQUE:  AP portable chest radiograph was submitted.    COMPARISON:  12/03/2020    FINDINGS:  AP portable chest radiograph demonstrates mild enlargement of the cardiac silhouette.  There is subtle asymmetric density in the left retrocardiac region.  Findings may represent atelectasis or infiltrate.  There is no pneumothorax or pleural effusion.  Cholecystectomy clips are present.  Impression: As above described.    Electronically signed by: Sahara Lee  Date:    12/11/2020  Time:    00:41      Assessment/Plan:      Active Diagnoses:    Diagnosis Date Noted POA    COVID-19 virus infection [U07.1] 12/11/2020 Yes    Essential hypertension [I10] 12/11/2020 Yes     Chronic      Problems Resolved During this Admission:       Overview / ICU Course:    Aniket Ac is a 35 y.o. female admitted for COVID-19 virus  infection.    Inpatient Medications Prescribed for Management of Current Problems:     Scheduled Meds:    ascorbic acid (vitamin C)  500 mg Oral BID    azithromycin  500 mg Oral Q24H    cefTRIAXone (ROCEPHIN) IVPB  1 g Intravenous QHS    cetirizine  10 mg Oral Daily    dexAMETHasone  6 mg Oral Daily    enoxaparin  40 mg Subcutaneous Q24H    hydroCHLOROthiazide  12.5 mg Oral Daily    lidocaine  1 patch Transdermal Q24H    methocarbamoL  500 mg Oral QID    multivitamin  1 tablet Oral Daily    pantoprazole  40 mg Oral Daily    remdesivir infusion  100 mg Intravenous Q24H    vitamin D  1,000 Units Oral Daily     Continuous Infusions:   As Needed: acetaminophen, albuterol, benzonatate, dextromethorphan-guaifenesin  mg/5 ml, dextrose 50%, dextrose 50%, glucagon (human recombinant), glucose, glucose, hydrOXYzine HCL, ketorolac, loperamide, melatonin, ondansetron, sodium chloride 0.9%, traMADoL    Assessment and Plan by Problem    COVID-19 Virus Infection:  Viral Pneumonia due to COVID-19:  -Pt tested for COVID-19 and noted to be positive on 12/3  -Isolation: Airborne/Droplet. Surgical mask on patient. Notify Infection Control  -[facility]  -Monitoring: Telemetry & Continuous Pulse Oximetry  -Antibiotics: started on ceftriaxone 1g Q24h x 5 days and azithromycin 500mg po x1, then 250mg po daily x 4 days  -Nutrition:               -Multivitamin PO daily              -Add Boost supplement              -Vitamin D 1000IU daily if deficient              -Ascorbic acid 500mg PO bid  -Supportive Care:              -Acetaminophen 650mg PO Q6hr PRN fever/headache              -Loperamide PRN viral diarrhea              -Robitussin, tessalon perls for cough              -IVF if indicated, restrictive strategy preferred, no maintenance IV if able   -Investigational Therapies:              -Due to hypoxia, pt started on dexamethasone 6mg PO daily up to 10 days or until  pt is discharged from hospital (whichever is sooner)              -Pt meets criteria for remdesivir.     Acute Hypoxemic Respiratory Failure:  -currently requiring 1 L O2 via nasal cannula  -RT consult via Respiratory Communication for COVID Protocols  -If wheezing, albuterol INH Q6h scheduled & PRN  -Incentive Spirometer Q4h  -Flutter Valve Q4h  -Continuous pulse oximetry; titrate oxygen to keep sats between 92-96%  -Wean off O2 as tolerated    -Supplemental O2 via LFNC, VentiMask, or HFNC (see Respiratory Support Oxygen Therapies)  -Proning Protocol if patient is a candidate with GCS >13 and able to self-prone (see HM Proning Protocol)  -If deterioration, may warrant trial of NIPPV and transfer to [facility] pressure room or immediate ICU consult     Hypertension:  -stable  -Continue hydrochlorothiazide 12.5 mg p.o. daily  -monitor vitals q4h  -SBP goal of <160 in hospital     Fall/back pain:  -reports she suffered a mechanical fall while walking to the bathroom in the ED and broke the fall with her hip  -will order x-ray pelvis/hip  -consider MRI if symptoms persist  -pain control with lidocaine patch, Tylenol, Toradol p.r.n.       Diet: Diet Adult Regular (IDDSI Level 7)  GI Prophylaxis: Indicated due to multiple minor risk factors  Significant LDAs:   IV Access Type: Peripheral  Urinary Catheter Indication if present: Patient Does Not Have Urinary Catheter  Other Lines/Tubes/Drains:    HIGH RISK CONDITION(S):   Patient has a condition that poses threat to life and bodily function: Severe Respiratory Distress     Goals of Care:    Previous admission:  12/3/20  Likely prognosis:  Fair  Code Status: Full Code  Comfort Only: No  Hospice: No  Goals at discharge: remain at home, with physician follow-up    Discharge Planning   XIAO: 12/15/2020     Code Status: Full Code   Is the patient medically ready for discharge?: No    Reason for patient still in hospital (select all that apply): Patient trending condition and  Treatment  Discharge Plan A: Home        VTE Risk Mitigation (From admission, onward)         Ordered     enoxaparin injection 40 mg  Every 24 hours      12/11/20 0241     IP VTE HIGH RISK PATIENT  Once      12/11/20 0241     Place INOCENCIO hose  Until discontinued      12/11/20 0241     Place sequential compression device  Until discontinued      12/11/20 0241                   Shirley Ta MD  Department of Hospital Medicine   [facility] - ICU 15 [facility]

## 2020-12-12 NOTE — NURSING
Paged med team B to notify pt lost IV access and we have been unable to start new IV after 2 nurses assessment and attempts. Pt only received half dose of ceftriaxone this a.m due to this issue.  I have consulted Midline team

## 2020-12-12 NOTE — NURSING
"Pt states she "has been in a lot of pain". I asked her if she had called as I had not been notified and she said she had not. She stated she was frustrated that we had taken her IV out and she isn't able to get IV pain med and states she does not want any pills for pain because they take too long to work and "pills don't help". I educated her that the IV was unsafe to keep in and reminded her that midline team is consulted and should see her this morning. I asked again if she wanted me to call to get a stronger pain med than tylenol and she said no. I reminded her that a lidocaine patch is ordered and offered again to apply it and she said she did not want it. Pt repositioned herself and denied wanting any other interventions.   "

## 2020-12-13 PROBLEM — J96.01 ACUTE RESPIRATORY FAILURE WITH HYPOXIA: Status: ACTIVE | Noted: 2020-12-13

## 2020-12-13 LAB
ALBUMIN SERPL BCP-MCNC: 3 G/DL (ref 3.5–5.2)
ALP SERPL-CCNC: 54 U/L (ref 55–135)
ALT SERPL W/O P-5'-P-CCNC: 34 U/L (ref 10–44)
ANION GAP SERPL CALC-SCNC: 16 MMOL/L (ref 8–16)
ANISOCYTOSIS BLD QL SMEAR: SLIGHT
AST SERPL-CCNC: 30 U/L (ref 10–40)
BASOPHILS # BLD AUTO: 0.02 K/UL (ref 0–0.2)
BASOPHILS NFR BLD: 0.3 % (ref 0–1.9)
BILIRUB SERPL-MCNC: 0.4 MG/DL (ref 0.1–1)
BUN SERPL-MCNC: 25 MG/DL (ref 6–20)
BURR CELLS BLD QL SMEAR: ABNORMAL
CALCIUM SERPL-MCNC: 8.9 MG/DL (ref 8.7–10.5)
CHLORIDE SERPL-SCNC: 100 MMOL/L (ref 95–110)
CO2 SERPL-SCNC: 22 MMOL/L (ref 23–29)
CREAT SERPL-MCNC: 0.9 MG/DL (ref 0.5–1.4)
CRP SERPL-MCNC: 68 MG/L (ref 0–8.2)
DIFFERENTIAL METHOD: ABNORMAL
EOSINOPHIL # BLD AUTO: 0 K/UL (ref 0–0.5)
EOSINOPHIL NFR BLD: 0 % (ref 0–8)
ERYTHROCYTE [DISTWIDTH] IN BLOOD BY AUTOMATED COUNT: 14 % (ref 11.5–14.5)
EST. GFR  (AFRICAN AMERICAN): >60 ML/MIN/1.73 M^2
EST. GFR  (NON AFRICAN AMERICAN): >60 ML/MIN/1.73 M^2
GLUCOSE SERPL-MCNC: 100 MG/DL (ref 70–110)
HCT VFR BLD AUTO: 42.8 % (ref 37–48.5)
HGB BLD-MCNC: 14 G/DL (ref 12–16)
HYPOCHROMIA BLD QL SMEAR: ABNORMAL
IMM GRANULOCYTES # BLD AUTO: 0.02 K/UL (ref 0–0.04)
IMM GRANULOCYTES NFR BLD AUTO: 0.3 % (ref 0–0.5)
LYMPHOCYTES # BLD AUTO: 1.2 K/UL (ref 1–4.8)
LYMPHOCYTES NFR BLD: 21.5 % (ref 18–48)
MAGNESIUM SERPL-MCNC: 2.8 MG/DL (ref 1.6–2.6)
MCH RBC QN AUTO: 24.3 PG (ref 27–31)
MCHC RBC AUTO-ENTMCNC: 32.7 G/DL (ref 32–36)
MCV RBC AUTO: 74 FL (ref 82–98)
MONOCYTES # BLD AUTO: 0.3 K/UL (ref 0.3–1)
MONOCYTES NFR BLD: 4.5 % (ref 4–15)
NEUTROPHILS # BLD AUTO: 4.2 K/UL (ref 1.8–7.7)
NEUTROPHILS NFR BLD: 73.4 % (ref 38–73)
NRBC BLD-RTO: 0 /100 WBC
OVALOCYTES BLD QL SMEAR: ABNORMAL
PHOSPHATE SERPL-MCNC: 5.4 MG/DL (ref 2.7–4.5)
PLATELET # BLD AUTO: 300 K/UL (ref 150–350)
PLATELET BLD QL SMEAR: ABNORMAL
PMV BLD AUTO: 10.6 FL (ref 9.2–12.9)
POIKILOCYTOSIS BLD QL SMEAR: SLIGHT
POTASSIUM SERPL-SCNC: 3.1 MMOL/L (ref 3.5–5.1)
PROT SERPL-MCNC: 7.4 G/DL (ref 6–8.4)
RBC # BLD AUTO: 5.75 M/UL (ref 4–5.4)
SODIUM SERPL-SCNC: 138 MMOL/L (ref 136–145)
WBC # BLD AUTO: 5.72 K/UL (ref 3.9–12.7)

## 2020-12-13 PROCEDURE — 83735 ASSAY OF MAGNESIUM: CPT

## 2020-12-13 PROCEDURE — 85025 COMPLETE CBC W/AUTO DIFF WBC: CPT

## 2020-12-13 PROCEDURE — 25000003 PHARM REV CODE 250: Performed by: HOSPITALIST

## 2020-12-13 PROCEDURE — 99900035 HC TECH TIME PER 15 MIN (STAT)

## 2020-12-13 PROCEDURE — 63700000 PHARM REV CODE 250 ALT 637 W/O HCPCS: Performed by: HOSPITALIST

## 2020-12-13 PROCEDURE — 25000003 PHARM REV CODE 250: Performed by: INTERNAL MEDICINE

## 2020-12-13 PROCEDURE — 84100 ASSAY OF PHOSPHORUS: CPT

## 2020-12-13 PROCEDURE — 94761 N-INVAS EAR/PLS OXIMETRY MLT: CPT

## 2020-12-13 PROCEDURE — 20600001 HC STEP DOWN PRIVATE ROOM

## 2020-12-13 PROCEDURE — 63600175 PHARM REV CODE 636 W HCPCS: Performed by: PHYSICIAN ASSISTANT

## 2020-12-13 PROCEDURE — 99233 SBSQ HOSP IP/OBS HIGH 50: CPT | Mod: 95,,, | Performed by: INTERNAL MEDICINE

## 2020-12-13 PROCEDURE — 25000003 PHARM REV CODE 250: Performed by: PHYSICIAN ASSISTANT

## 2020-12-13 PROCEDURE — 94664 DEMO&/EVAL PT USE INHALER: CPT

## 2020-12-13 PROCEDURE — 86140 C-REACTIVE PROTEIN: CPT

## 2020-12-13 PROCEDURE — 36415 COLL VENOUS BLD VENIPUNCTURE: CPT

## 2020-12-13 PROCEDURE — 99233 PR SUBSEQUENT HOSPITAL CARE,LEVL III: ICD-10-PCS | Mod: 95,,, | Performed by: INTERNAL MEDICINE

## 2020-12-13 PROCEDURE — 80053 COMPREHEN METABOLIC PANEL: CPT

## 2020-12-13 RX ORDER — CALCIUM CARBONATE 200(500)MG
1000 TABLET,CHEWABLE ORAL 3 TIMES DAILY PRN
Status: DISCONTINUED | OUTPATIENT
Start: 2020-12-13 | End: 2020-12-16 | Stop reason: HOSPADM

## 2020-12-13 RX ADMIN — POTASSIUM BICARBONATE 50 MEQ: 978 TABLET, EFFERVESCENT ORAL at 08:12

## 2020-12-13 RX ADMIN — METHOCARBAMOL TABLETS 500 MG: 500 TABLET, COATED ORAL at 08:12

## 2020-12-13 RX ADMIN — METHOCARBAMOL TABLETS 500 MG: 500 TABLET, COATED ORAL at 11:12

## 2020-12-13 RX ADMIN — METHOCARBAMOL TABLETS 500 MG: 500 TABLET, COATED ORAL at 04:12

## 2020-12-13 RX ADMIN — OXYCODONE HYDROCHLORIDE AND ACETAMINOPHEN 500 MG: 500 TABLET ORAL at 08:12

## 2020-12-13 RX ADMIN — KETOROLAC TROMETHAMINE 15 MG: 30 INJECTION, SOLUTION INTRAMUSCULAR at 08:12

## 2020-12-13 RX ADMIN — AZITHROMYCIN MONOHYDRATE 500 MG: 250 TABLET ORAL at 06:12

## 2020-12-13 RX ADMIN — REMDESIVIR 100 MG: 100 INJECTION, POWDER, LYOPHILIZED, FOR SOLUTION INTRAVENOUS at 04:12

## 2020-12-13 RX ADMIN — KETOROLAC TROMETHAMINE 15 MG: 30 INJECTION, SOLUTION INTRAMUSCULAR at 11:12

## 2020-12-13 RX ADMIN — TRAMADOL HYDROCHLORIDE 50 MG: 50 TABLET, FILM COATED ORAL at 07:12

## 2020-12-13 RX ADMIN — LIDOCAINE 1 PATCH: 50 PATCH TOPICAL at 03:12

## 2020-12-13 RX ADMIN — ENOXAPARIN SODIUM 40 MG: 40 INJECTION SUBCUTANEOUS at 08:12

## 2020-12-13 RX ADMIN — CALCIUM CARBONATE (ANTACID) CHEW TAB 500 MG 1000 MG: 500 CHEW TAB at 06:12

## 2020-12-13 RX ADMIN — CETIRIZINE HYDROCHLORIDE 10 MG: 10 TABLET, FILM COATED ORAL at 09:12

## 2020-12-13 RX ADMIN — HYDROCHLOROTHIAZIDE 12.5 MG: 12.5 TABLET ORAL at 08:12

## 2020-12-13 RX ADMIN — DEXAMETHASONE 6 MG: 4 TABLET ORAL at 08:12

## 2020-12-13 RX ADMIN — CHOLECALCIFEROL (VITAMIN D3) 25 MCG (1,000 UNIT) TABLET 1000 UNITS: TABLET at 08:12

## 2020-12-13 RX ADMIN — CEFTRIAXONE SODIUM 1 G: 1 INJECTION, POWDER, FOR SOLUTION INTRAMUSCULAR; INTRAVENOUS at 08:12

## 2020-12-13 RX ADMIN — KETOROLAC TROMETHAMINE 15 MG: 30 INJECTION, SOLUTION INTRAMUSCULAR at 01:12

## 2020-12-13 RX ADMIN — PANTOPRAZOLE SODIUM 40 MG: 40 TABLET, DELAYED RELEASE ORAL at 08:12

## 2020-12-13 RX ADMIN — Medication 1 TABLET: at 09:12

## 2020-12-13 NOTE — PLAN OF CARE
Pt AAOx4. Remains free of hospital acquired illness and no recent falls. Plan of care reviewed with pt; verbalized understanding.

## 2020-12-13 NOTE — PROGRESS NOTES
[facility] - ICU 15 [facility] Medicine  Telemedicine Progress Note    Patient Name: Aniket Ac  MRN: [id number]  Patient Class: IP- Inpatient   Admission Date: 12/10/2020  Length of Stay: 0 days  Attending Physician: Shirley Ta MD  Primary Care Provider: ESTEFANÍA GarcíaKOFFI    [facility] Medicine Team: [facility] [place] VIRTUAL TEAM 10 Shirley Ta MD  Virtual Telemedicine Progress Note  Patient was transferred to the telemedicine service on: 12/12/2020  Chief complaint: COVID-19 virus infection  The patient location is: [facility]/[id number] A  The patient arrived at: 12/10/2020  9:47 PM  Start time: 1529  End time:  1534  Total time spent with patient: 5 min  Present with the patient at the time of the telemed/virtual assessment: n/a  I have assessed findings virtually using a telemedicine platform and with assistance of the bedside nurse or telemedicine presenter.  The attending portion of this evaluation, treatment, and documentation was performed per Shirley Ta MD via audiovisual.    Subjective:     Admission CC:   Chief Complaint   Patient presents with    COVID-19 Concerns     pt tested positive for COVID on 12/3/2020. pt c/o fever, fatigue, dehydration, chest pain, and lower back pain.      Follow up visit for: COVID-19 virus infection    Interval History / Events Overnight:   The patient is able to provide adequate history. Additional history was obtained from past medical records and chart review. No significant events reported by Nursing.  Patient complains of back pain. Symptoms have improved since yesterday. Associated symptoms include: pleuritic chest pain and fatigue. Symptoms are stable. Alleviating factors include: medication: Robaxin and rest.  SpO2 96% on 1 L NC    Data reviewed 12/13/2020: Lab test(s) reviewed: hypokalemia    Review of Systems   Constitutional: Positive for fatigue. Negative for fever.   Respiratory: Positive for shortness of breath.    Cardiovascular: Positive  for chest pain.     Objective:     Vital Signs (Most Recent):  Temp: 98 °F (36.7 °C) (12/13/20 1155)  Pulse: 68 (12/13/20 1155)  Resp: 20 (12/13/20 1155)  BP: 101/64 (12/13/20 1155)  SpO2: 96 % (12/13/20 1155) Vital Signs (24h Range):  Temp:  [97.7 °F (36.5 °C)-98.7 °F (37.1 °C)] 98 °F (36.7 °C)  Pulse:  [58-83] 68  Resp:  [18-20] 20  SpO2:  [92 %-98 %] 96 %  BP: ()/(64-69) 101/64     Weight: 114 kg (251 lb 6.4 oz)  Body mass index is 37.13 kg/m².    Intake/Output Summary (Last 24 hours) at 12/13/2020 1536  Last data filed at 12/13/2020 0805  Gross per 24 hour   Intake 480 ml   Output --   Net 480 ml      Physical Exam  Constitutional:       General: She is not in acute distress.     Appearance: Normal appearance. She is not diaphoretic.   Eyes:      General: Lids are normal. No scleral icterus.        Right eye: No discharge.         Left eye: No discharge.      Conjunctiva/sclera: Conjunctivae normal.   Neck:      Trachea: Phonation normal.   Cardiovascular:      Rate and Rhythm: Normal rate.   Pulmonary:      Effort: Pulmonary effort is normal. No tachypnea, accessory muscle usage or respiratory distress.   Abdominal:      General: There is no distension.   Neurological:      Mental Status: She is alert. She is not disoriented.   Psychiatric:         Attention and Perception: Attention normal.         Mood and Affect: Affect normal.         Behavior: Behavior is cooperative.         Significant Labs:   .  Recent Labs   Lab 12/11/20  0018 12/13/20  0035   WBC 4.95 5.72   HGB 15.3 14.0   HCT 46.8 42.8    300     Recent Labs   Lab 12/11/20  0018 12/13/20  0035   GRAN 62.0  3.1 73.4*  4.2   LYMPH 31.9  1.6 21.5  1.2   MONO 5.3  0.3 4.5  0.3   EOS 0.0 0.0     Recent Labs   Lab 12/11/20  0901 12/12/20  0622 12/13/20  0510   * 138 138   K 3.5 3.3* 3.1*   CL 96 100 100   CO2 23 22* 22*   BUN 15 22* 25*   CREATININE 0.9 0.8 0.9   * 107 100   CALCIUM 8.7 8.7 8.9   ALBUMIN 3.1* 3.2* 3.0*   MG   --  3.2* 2.8*   PHOS  --  4.5 5.4*     Recent Labs   Lab 12/11/20  0901 12/11/20  0902 12/12/20  0622 12/13/20  0510   ALKPHOS 51*  --  60 54*   ALT 44  --  42 34   AST 56*  --  46* 30   PROT 7.5  --  7.8 7.4   BILITOT 0.4  --  0.3 0.4   INR  --  0.9  --   --      Procalcitonin (ng/mL)   Date Value   12/11/2020 0.22     Lactate (Lactic Acid) (mmol/L)   Date Value   12/11/2020 0.7   12/11/2020 2.7 (H)     BNP (pg/mL)   Date Value   12/11/2020 <10   09/09/2020 20   03/26/2020 <10     CRP (mg/L)   Date Value   12/13/2020 68.0 (H)   12/11/2020 126.2 (H)     Sed Rate (mm/Hr)   Date Value   12/11/2020 89 (H)     D-Dimer (mg/L FEU)   Date Value   12/11/2020 0.64 (H)   03/26/2020 0.31     Ferritin (ng/mL)   Date Value   12/11/2020 1,900 (H)     LD (U/L)   Date Value   12/11/2020 508 (H)     Troponin I (ng/mL)   Date Value   12/11/2020 0.009   09/09/2020 <0.006   03/26/2020 <0.006     CPK (U/L)   Date Value   12/11/2020 157     Results for orders placed or performed during the hospital encounter of 12/10/20   Vitamin D   Result Value Ref Range    Vit D, 25-Hydroxy 29 (L) 30 - 96 ng/mL     SARS-CoV2 (COVID-19) Qualitative PCR (no units)   Date Value   03/25/2020 Not Detected     POC Rapid COVID (no units)   Date Value   12/03/2020 Positive (A)     ECG Results          EKG 12-lead (Final result)  Result time 12/11/20 15:51:01    Final result by Interface, Lab In [facility] (12/11/20 15:51:01)                 Narrative:    Test Reason : R07.9,    Vent. Rate : 103 BPM     Atrial Rate : 103 BPM     P-R Int : 166 ms          QRS Dur : 090 ms      QT Int : 336 ms       P-R-T Axes : 075 084 047 degrees     QTc Int : 440 ms    Sinus tachycardia  Otherwise normal ECG  When compared with ECG of 10-DEC-2020 21:53,  No significant change was found  Confirmed by SARAH MUÑOZ MD ([id number]) on 12/11/2020 3:50:50 PM    Referred By: [email or web address]   SELF           Confirmed By:SARAH MUÑOZ MD                             EKG 12-lead (Final result)   Result time 12/11/20 15:47:54    Final result by Interface, Lab In [facility] (12/11/20 15:47:54)                 Narrative:    Test Reason : U07.1,J06.9,    Vent. Rate : 110 BPM     Atrial Rate : 110 BPM     P-R Int : 156 ms          QRS Dur : 084 ms      QT Int : 310 ms       P-R-T Axes : 065 092 021 degrees     QTc Int : 419 ms    Sinus tachycardia  Right atrial enlargement  Rightward axis  Borderline Abnormal ECG  When compared with ECG of 09-SEP-2020 20:18,  Nonspecific T wave abnormality no longer evident in Lateral leads  Confirmed by SARAH MUÑOZ MD ([id number]) on 12/11/2020 3:47:45 PM    Referred By: [email or web address]   SELF           Confirmed By:SARAH MUÑOZ MD                            X-Ray Hips Bilateral 2 View Inc AP Pelvis  Narrative: EXAMINATION:  XR HIPS BILATERAL 2 VIEW INCL AP PELVIS    CLINICAL HISTORY:  fall, lower back pain;    TECHNIQUE:  AP view of the pelvis and frogleg lateral views of both hips were performed.    COMPARISON:  None.    FINDINGS:  No acute fractures.  No malalignment.  Preserved right and left SI joints and hip joint spaces.  Preserved right and left femoral head contours.  Impression: No fracture.    Electronically signed by: Gurmeet Lozano  Date:    12/11/2020  Time:    11:46  X-Ray Chest AP Portable  Narrative: EXAMINATION:  AP PORTABLE CHEST    CLINICAL HISTORY:  covid;    TECHNIQUE:  AP portable chest radiograph was submitted.    COMPARISON:  12/03/2020    FINDINGS:  AP portable chest radiograph demonstrates mild enlargement of the cardiac silhouette.  There is subtle asymmetric density in the left retrocardiac region.  Findings may represent atelectasis or infiltrate.  There is no pneumothorax or pleural effusion.  Cholecystectomy clips are present.  Impression: As above described.    Electronically signed by: Sahara Lee  Date:    12/11/2020  Time:    00:41      Assessment/Plan:      Active Diagnoses:    Diagnosis Date Noted POA    PRINCIPAL PROBLEM:  COVID-19 virus infection  [U07.1] 12/11/2020 Yes    Acute respiratory failure with hypoxia [J96.01] 12/13/2020 Yes    Back pain [M54.9] 12/12/2020 Yes    Essential hypertension [I10] 12/11/2020 Yes     Chronic      Problems Resolved During this Admission:       Overview / ICU Course:    Aniket Ac is a 35 y.o. female admitted for COVID-19 virus infection.    Inpatient Medications Prescribed for Management of Current Problems:     Scheduled Meds:    ascorbic acid (vitamin C)  500 mg Oral BID    cefTRIAXone (ROCEPHIN) IVPB  1 g Intravenous QHS    cetirizine  10 mg Oral Daily    dexAMETHasone  6 mg Oral Daily    enoxaparin  40 mg Subcutaneous Q24H    hydroCHLOROthiazide  12.5 mg Oral Daily    lidocaine  1 patch Transdermal Q24H    methocarbamoL  500 mg Oral QID    multivitamin  1 tablet Oral Daily    pantoprazole  40 mg Oral Daily    remdesivir infusion  100 mg Intravenous Q24H    vitamin D  1,000 Units Oral Daily     Continuous Infusions:   As Needed: acetaminophen, albuterol, benzonatate, calcium carbonate, dextromethorphan-guaifenesin  mg/5 ml, dextrose 50%, dextrose 50%, glucagon (human recombinant), glucose, glucose, hydrOXYzine HCL, ketorolac, loperamide, melatonin, ondansetron, sodium chloride 0.9%, traMADoL    Assessment and Plan by Problem    COVID-19 Virus Infection:  Viral Pneumonia due to COVID-19:  -Pt tested for COVID-19 and noted to be positive on 12/3  -Isolation: Airborne/Droplet. Surgical mask on patient. Notify Infection Control  -Management: per [facility] COVID Treatment Protocol (4/15/20)  -Monitoring: Telemetry & Continuous Pulse Oximetry  -Antibiotics: started on ceftriaxone 1g Q24h x 5 days and azithromycin 500mg po x1, then 250mg po daily x 4 days  -Nutrition:               -Multivitamin PO daily              -Add Boost supplement              -Vitamin D 1000IU daily if deficient              -Ascorbic acid 500mg PO bid  -Supportive Care:              -Acetaminophen 650mg PO Q6hr PRN  fever/headache              -Loperamide PRN viral diarrhea              -Robitussin, tessalon perls for cough              -IVF if indicated, restrictive strategy preferred, no maintenance IV if able   -Investigational Therapies:              -Due to hypoxia, pt started on dexamethasone 6mg PO daily up to 10 days or until pt is discharged from hospital (whichever is sooner)              -Pt meets criteria for remdesivir - started     Acute Hypoxemic Respiratory Failure:  -currently requiring 1 L O2 via nasal cannula  -RT consult via Respiratory Communication for COVID Protocols  -If wheezing, albuterol INH Q6h scheduled & PRN  -Incentive Spirometer Q4h  -Flutter Valve Q4h  -Continuous pulse oximetry; titrate oxygen to keep sats between 92-96%  -Wean off O2 as tolerated    -Supplemental O2 via LFNC, VentiMask, or HFNC (see Respiratory Support Oxygen Therapies)  -Proning Protocol if patient is a candidate with GCS >13 and able to self-prone (see HM Proning Protocol)  -If deterioration, may warrant trial of NIPPV and transfer to neg pressure room or immediate ICU consult     Hypertension:  -stable  -Continue hydrochlorothiazide 12.5 mg p.o. daily  -monitor vitals q4h  -SBP goal of <160 in hospital     Fall/back pain:  -reports she suffered a mechanical fall while walking to the bathroom in the ED and broke the fall with her hip  -will order x-ray pelvis/hip  -consider MRI if symptoms persist  -pain control with lidocaine patch, Tylenol, Toradol p.r.n.  -Improved with Robaxin.   -OT/PT       Diet: Diet Adult Regular (IDDSI Level 7)  GI Prophylaxis: Indicated due to multiple minor risk factors  Significant LDAs:   IV Access Type: Peripheral  Urinary Catheter Indication if present: Patient Does Not Have Urinary Catheter  Other Lines/Tubes/Drains:    HIGH RISK CONDITION(S):   Patient has a condition that poses threat to life and bodily function: Severe Respiratory Distress     Goals of Care:    Previous admission:   12/3/20  Likely prognosis:  Fair  Code Status: Full Code  Comfort Only: No  Hospice: No  Goals at discharge: remain at home, with physician follow-up    Discharge Planning   XIAO: 12/15/2020     Code Status: Full Code   Is the patient medically ready for discharge?: No    Reason for patient still in hospital (select all that apply): Patient trending condition and Treatment  Discharge Plan A: Home        VTE Risk Mitigation (From admission, onward)         Ordered     enoxaparin injection 40 mg  Every 24 hours      12/11/20 0241     IP VTE HIGH RISK PATIENT  Once      12/11/20 0241     Place INOCENCIO hose  Until discontinued      12/11/20 0241     Place sequential compression device  Until discontinued      12/11/20 0241                   Shirley Ta MD  Department of Hospital Medicine   [facility] - ICU 15 [facility]

## 2020-12-13 NOTE — PLAN OF CARE
Problem: Fall Injury Risk  Goal: Absence of Fall and Fall-Related Injury  Outcome: Ongoing, Progressing     Patient reports pain in her left flank prn pain med given per orders. No distress noted. Rested well once pain had subsided. Free from falls. Wheels locked. Bed in lowest position. Call bell in reach.

## 2020-12-14 LAB
ALBUMIN SERPL BCP-MCNC: 3 G/DL (ref 3.5–5.2)
ALP SERPL-CCNC: 49 U/L (ref 55–135)
ALT SERPL W/O P-5'-P-CCNC: 29 U/L (ref 10–44)
ANION GAP SERPL CALC-SCNC: 13 MMOL/L (ref 8–16)
ANISOCYTOSIS BLD QL SMEAR: SLIGHT
AST SERPL-CCNC: 28 U/L (ref 10–40)
BACTERIA SPEC AEROBE CULT: NORMAL
BACTERIA SPEC AEROBE CULT: NORMAL
BASOPHILS # BLD AUTO: 0 K/UL (ref 0–0.2)
BASOPHILS NFR BLD: 0 % (ref 0–1.9)
BILIRUB SERPL-MCNC: 0.4 MG/DL (ref 0.1–1)
BUN SERPL-MCNC: 24 MG/DL (ref 6–20)
CALCIUM SERPL-MCNC: 8.8 MG/DL (ref 8.7–10.5)
CHLORIDE SERPL-SCNC: 99 MMOL/L (ref 95–110)
CO2 SERPL-SCNC: 28 MMOL/L (ref 23–29)
CREAT SERPL-MCNC: 1 MG/DL (ref 0.5–1.4)
CRP SERPL-MCNC: 36.1 MG/L (ref 0–8.2)
D DIMER PPP IA.FEU-MCNC: 0.65 MG/L FEU
DIFFERENTIAL METHOD: ABNORMAL
EOSINOPHIL # BLD AUTO: 0 K/UL (ref 0–0.5)
EOSINOPHIL NFR BLD: 0 % (ref 0–8)
ERYTHROCYTE [DISTWIDTH] IN BLOOD BY AUTOMATED COUNT: 13.9 % (ref 11.5–14.5)
EST. GFR  (AFRICAN AMERICAN): >60 ML/MIN/1.73 M^2
EST. GFR  (NON AFRICAN AMERICAN): >60 ML/MIN/1.73 M^2
FERRITIN SERPL-MCNC: 647 NG/ML (ref 20–300)
GLUCOSE SERPL-MCNC: 87 MG/DL (ref 70–110)
GRAM STN SPEC: NORMAL
HCT VFR BLD AUTO: 41.7 % (ref 37–48.5)
HGB BLD-MCNC: 13.4 G/DL (ref 12–16)
IMM GRANULOCYTES # BLD AUTO: 0.03 K/UL (ref 0–0.04)
IMM GRANULOCYTES NFR BLD AUTO: 0.6 % (ref 0–0.5)
LYMPHOCYTES # BLD AUTO: 1.7 K/UL (ref 1–4.8)
LYMPHOCYTES NFR BLD: 37.4 % (ref 18–48)
MAGNESIUM SERPL-MCNC: 2.9 MG/DL (ref 1.6–2.6)
MCH RBC QN AUTO: 24.2 PG (ref 27–31)
MCHC RBC AUTO-ENTMCNC: 32.1 G/DL (ref 32–36)
MCV RBC AUTO: 75 FL (ref 82–98)
MONOCYTES # BLD AUTO: 0.3 K/UL (ref 0.3–1)
MONOCYTES NFR BLD: 6.9 % (ref 4–15)
NEUTROPHILS # BLD AUTO: 2.5 K/UL (ref 1.8–7.7)
NEUTROPHILS NFR BLD: 55.1 % (ref 38–73)
NRBC BLD-RTO: 0 /100 WBC
OVALOCYTES BLD QL SMEAR: ABNORMAL
PHOSPHATE SERPL-MCNC: 5 MG/DL (ref 2.7–4.5)
PLATELET # BLD AUTO: 335 K/UL (ref 150–350)
PLATELET BLD QL SMEAR: ABNORMAL
PMV BLD AUTO: 10.8 FL (ref 9.2–12.9)
POIKILOCYTOSIS BLD QL SMEAR: SLIGHT
POTASSIUM SERPL-SCNC: 3.5 MMOL/L (ref 3.5–5.1)
PROT SERPL-MCNC: 7.5 G/DL (ref 6–8.4)
RBC # BLD AUTO: 5.53 M/UL (ref 4–5.4)
SODIUM SERPL-SCNC: 140 MMOL/L (ref 136–145)
WBC # BLD AUTO: 4.62 K/UL (ref 3.9–12.7)

## 2020-12-14 PROCEDURE — 84100 ASSAY OF PHOSPHORUS: CPT

## 2020-12-14 PROCEDURE — 25000003 PHARM REV CODE 250: Performed by: INTERNAL MEDICINE

## 2020-12-14 PROCEDURE — 25000003 PHARM REV CODE 250: Performed by: PHYSICIAN ASSISTANT

## 2020-12-14 PROCEDURE — 82728 ASSAY OF FERRITIN: CPT

## 2020-12-14 PROCEDURE — 97162 PT EVAL MOD COMPLEX 30 MIN: CPT

## 2020-12-14 PROCEDURE — 94799 UNLISTED PULMONARY SVC/PX: CPT

## 2020-12-14 PROCEDURE — 86140 C-REACTIVE PROTEIN: CPT

## 2020-12-14 PROCEDURE — 94761 N-INVAS EAR/PLS OXIMETRY MLT: CPT

## 2020-12-14 PROCEDURE — 80053 COMPREHEN METABOLIC PANEL: CPT

## 2020-12-14 PROCEDURE — 36415 COLL VENOUS BLD VENIPUNCTURE: CPT

## 2020-12-14 PROCEDURE — 97165 OT EVAL LOW COMPLEX 30 MIN: CPT

## 2020-12-14 PROCEDURE — 83735 ASSAY OF MAGNESIUM: CPT

## 2020-12-14 PROCEDURE — 63600175 PHARM REV CODE 636 W HCPCS: Performed by: INTERNAL MEDICINE

## 2020-12-14 PROCEDURE — 99233 PR SUBSEQUENT HOSPITAL CARE,LEVL III: ICD-10-PCS | Mod: 95,,, | Performed by: INTERNAL MEDICINE

## 2020-12-14 PROCEDURE — 25000003 PHARM REV CODE 250: Performed by: HOSPITALIST

## 2020-12-14 PROCEDURE — 20600001 HC STEP DOWN PRIVATE ROOM

## 2020-12-14 PROCEDURE — 85025 COMPLETE CBC W/AUTO DIFF WBC: CPT

## 2020-12-14 PROCEDURE — 94664 DEMO&/EVAL PT USE INHALER: CPT

## 2020-12-14 PROCEDURE — 99233 SBSQ HOSP IP/OBS HIGH 50: CPT | Mod: 95,,, | Performed by: INTERNAL MEDICINE

## 2020-12-14 PROCEDURE — 27000221 HC OXYGEN, UP TO 24 HOURS

## 2020-12-14 PROCEDURE — 99900035 HC TECH TIME PER 15 MIN (STAT)

## 2020-12-14 PROCEDURE — 63600175 PHARM REV CODE 636 W HCPCS: Performed by: PHYSICIAN ASSISTANT

## 2020-12-14 PROCEDURE — 85379 FIBRIN DEGRADATION QUANT: CPT

## 2020-12-14 RX ORDER — MUPIROCIN 20 MG/G
OINTMENT TOPICAL 2 TIMES DAILY
Status: DISCONTINUED | OUTPATIENT
Start: 2020-12-14 | End: 2020-12-16 | Stop reason: HOSPADM

## 2020-12-14 RX ORDER — KETOROLAC TROMETHAMINE 15 MG/ML
15 INJECTION, SOLUTION INTRAMUSCULAR; INTRAVENOUS EVERY 6 HOURS PRN
Status: DISCONTINUED | OUTPATIENT
Start: 2020-12-14 | End: 2020-12-16 | Stop reason: HOSPADM

## 2020-12-14 RX ADMIN — CHOLECALCIFEROL (VITAMIN D3) 25 MCG (1,000 UNIT) TABLET 1000 UNITS: TABLET at 08:12

## 2020-12-14 RX ADMIN — TRAMADOL HYDROCHLORIDE 50 MG: 50 TABLET, FILM COATED ORAL at 08:12

## 2020-12-14 RX ADMIN — CETIRIZINE HYDROCHLORIDE 10 MG: 10 TABLET, FILM COATED ORAL at 08:12

## 2020-12-14 RX ADMIN — OXYCODONE HYDROCHLORIDE AND ACETAMINOPHEN 500 MG: 500 TABLET ORAL at 08:12

## 2020-12-14 RX ADMIN — DEXAMETHASONE 6 MG: 4 TABLET ORAL at 08:12

## 2020-12-14 RX ADMIN — ENOXAPARIN SODIUM 40 MG: 40 INJECTION SUBCUTANEOUS at 08:12

## 2020-12-14 RX ADMIN — METHOCARBAMOL TABLETS 500 MG: 500 TABLET, COATED ORAL at 08:12

## 2020-12-14 RX ADMIN — METHOCARBAMOL TABLETS 500 MG: 500 TABLET, COATED ORAL at 04:12

## 2020-12-14 RX ADMIN — PANTOPRAZOLE SODIUM 40 MG: 40 TABLET, DELAYED RELEASE ORAL at 08:12

## 2020-12-14 RX ADMIN — REMDESIVIR 100 MG: 100 INJECTION, POWDER, LYOPHILIZED, FOR SOLUTION INTRAVENOUS at 04:12

## 2020-12-14 RX ADMIN — KETOROLAC TROMETHAMINE 15 MG: 15 INJECTION, SOLUTION INTRAMUSCULAR; INTRAVENOUS at 10:12

## 2020-12-14 RX ADMIN — METHOCARBAMOL TABLETS 500 MG: 500 TABLET, COATED ORAL at 01:12

## 2020-12-14 RX ADMIN — MUPIROCIN: 20 OINTMENT TOPICAL at 08:12

## 2020-12-14 RX ADMIN — MUPIROCIN: 20 OINTMENT TOPICAL at 11:12

## 2020-12-14 RX ADMIN — CEFTRIAXONE SODIUM 1 G: 1 INJECTION, POWDER, FOR SOLUTION INTRAMUSCULAR; INTRAVENOUS at 08:12

## 2020-12-14 RX ADMIN — TRAMADOL HYDROCHLORIDE 50 MG: 50 TABLET, FILM COATED ORAL at 12:12

## 2020-12-14 RX ADMIN — KETOROLAC TROMETHAMINE 15 MG: 15 INJECTION, SOLUTION INTRAMUSCULAR; INTRAVENOUS at 01:12

## 2020-12-14 RX ADMIN — Medication 1 TABLET: at 08:12

## 2020-12-14 RX ADMIN — HYDROCHLOROTHIAZIDE 12.5 MG: 12.5 TABLET ORAL at 08:12

## 2020-12-14 NOTE — PLAN OF CARE
Problem: Fall Injury Risk  Goal: Absence of Fall and Fall-Related Injury  Outcome: Ongoing, Progressing     Patient rested well through the night. Free from falls. Wheels locked. Bed in lowest position. Call bell in reach.

## 2020-12-14 NOTE — PLAN OF CARE
Eval completed and POC established     Luca Schofield, PT, DPT  2020     Problem: Physical Therapy Goal  Goal: Physical Therapy Goal  Description: Goals to be met by: 2021    Patient will increase functional independence with mobility by performin. Supine to sit with Modified [place]  2. Sit to supine with Modified [place]  3. Sit to stand transfer with Modified [place]  4. Gait  x 100 feet with Stand-by Assistance using LRAD  5. Lower extremity exercise program x15 reps, with independence     Outcome: Ongoing, Progressing

## 2020-12-14 NOTE — PROGRESS NOTES
[facility] Medicine  Telemedicine Progress Note    Patient Name: Aniket Ac  MRN: [id number]  Patient Class: IP- Inpatient   Admission Date: 12/10/2020  Length of Stay: 1 days  Attending Physician: Shirley Ta MD  Primary Care Provider: ESTEFANÍA GarcíaKOFFI    [facility] Medicine Team: [facility] [place] 10 Shirley Ta MD  Virtual Telemedicine Progress Note  Patient was transferred to the telemedicine service on: 12/12/2020  Chief complaint: COVID-19 virus infection  The [facility] location is: [facility]  The patient arrived at: 12/10/2020  9:47 PM  Start time: 1356  End time:  1406  Total time spent with patient: 10 min  Present with the patient at the time of the telemed/virtual assessment: n/a  I have assessed findings virtually using a telemedicine platform and with assistance of the bedside nurse or telemedicine presenter.  The attending portion of this evaluation, treatment, and documentation was performed per Shirley Ta MD via audiovisual.    Subjective:     Admission CC:   Chief Complaint   Patient presents with    COVID-19 Concerns     pt tested positive for COVID on 12/3/2020. pt c/o fever, fatigue, dehydration, chest pain, and lower back pain.      Follow up visit for: COVID-19 virus infection    Interval History / Events Overnight:   The patient is able to provide adequate history. Additional history was obtained from past medical records and chart review. No significant events reported by Nursing.  Patient complains of back pain. Symptoms have improved since yesterday. Associated symptoms include: pleuritic chest pain and fatigue. Symptoms are stable. Alleviating factors include: medication: Robaxin and rest.  SpO2 98% on 1 L NC    Data reviewed 12/14/2020: Lab test(s) reviewed: CRP improved    Review of Systems   Constitutional: Positive for fatigue. Negative for fever.   Respiratory: Positive for shortness of breath.    Cardiovascular: Positive  for chest pain.     Objective:     Vital Signs (Most Recent):  Temp: 97.5 °F (36.4 °C) (12/14/20 1151)  Pulse: 73 (12/14/20 1151)  Resp: 18 (12/14/20 1151)  BP: 124/63 (12/14/20 1151)  SpO2: 96 % (12/14/20 1151) Vital Signs (24h Range):  Temp:  [97.5 °F (36.4 °C)-98.8 °F (37.1 °C)] 97.5 °F (36.4 °C)  Pulse:  [54-85] 73  Resp:  [18-20] 18  SpO2:  [95 %-100 %] 96 %  BP: ()/(55-64) 124/63     Weight: 114 kg (251 lb 5.2 oz)  Body mass index is 37.11 kg/m².    Intake/Output Summary (Last 24 hours) at 12/14/2020 1440  Last data filed at 12/14/2020 1240  Gross per 24 hour   Intake 480 ml   Output --   Net 480 ml      Physical Exam  Constitutional:       General: She is not in acute distress.     Appearance: Normal appearance. She is not diaphoretic.   Eyes:      General: Lids are normal. No scleral icterus.        Right eye: No discharge.         Left eye: No discharge.      Conjunctiva/sclera: Conjunctivae normal.   Neck:      Trachea: Phonation normal.   Cardiovascular:      Rate and Rhythm: Normal rate.   Pulmonary:      Effort: Pulmonary effort is normal. No tachypnea, accessory muscle usage or respiratory distress.   Abdominal:      General: There is no distension.   Neurological:      Mental Status: She is alert. She is not disoriented.   Psychiatric:         Attention and Perception: Attention normal.         Mood and Affect: Affect normal.         Behavior: Behavior is cooperative.         Significant Labs:   .  Recent Labs   Lab 12/11/20  0018 12/13/20  0035 12/14/20  0422   WBC 4.95 5.72 4.62   HGB 15.3 14.0 13.4   HCT 46.8 42.8 41.7    300 335     Recent Labs   Lab 12/11/20  0018 12/13/20  0035 12/14/20  0422   GRAN 62.0  3.1 73.4*  4.2 55.1  2.5   LYMPH 31.9  1.6 21.5  1.2 37.4  1.7   MONO 5.3  0.3 4.5  0.3 6.9  0.3   EOS 0.0 0.0 0.0     Recent Labs   Lab 12/12/20  0622 12/13/20  0510 12/14/20  0422    138 140   K 3.3* 3.1* 3.5    100 99   CO2 22* 22* 28   BUN 22* 25* 24*    CREATININE 0.8 0.9 1.0    100 87   CALCIUM 8.7 8.9 8.8   ALBUMIN 3.2* 3.0* 3.0*   MG 3.2* 2.8* 2.9*   PHOS 4.5 5.4* 5.0*     Recent Labs   Lab 12/11/20  0902 12/12/20  0622 12/13/20  0510 12/14/20  0422   ALKPHOS  --  60 54* 49*   ALT  --  42 34 29   AST  --  46* 30 28   PROT  --  7.8 7.4 7.5   BILITOT  --  0.3 0.4 0.4   INR 0.9  --   --   --      Procalcitonin (ng/mL)   Date Value   12/11/2020 0.22     Lactate (Lactic Acid) (mmol/L)   Date Value   12/11/2020 0.7   12/11/2020 2.7 (H)     BNP (pg/mL)   Date Value   12/11/2020 <10   09/09/2020 20   03/26/2020 <10     CRP (mg/L)   Date Value   12/14/2020 36.1 (H)   12/13/2020 68.0 (H)   12/11/2020 126.2 (H)     Sed Rate (mm/Hr)   Date Value   12/11/2020 89 (H)     D-Dimer (mg/L FEU)   Date Value   12/14/2020 0.65 (H)   12/11/2020 0.64 (H)   03/26/2020 0.31     Ferritin (ng/mL)   Date Value   12/14/2020 647 (H)   12/11/2020 1,900 (H)     LD (U/L)   Date Value   12/11/2020 508 (H)     Troponin I (ng/mL)   Date Value   12/11/2020 0.009   09/09/2020 <0.006   03/26/2020 <0.006     CPK (U/L)   Date Value   12/11/2020 157     Results for orders placed or performed during the hospital encounter of 12/10/20   Vitamin D   Result Value Ref Range    Vit D, 25-Hydroxy 29 (L) 30 - 96 ng/mL     SARS-CoV2 (COVID-19) Qualitative PCR (no units)   Date Value   03/25/2020 Not Detected     POC Rapid COVID (no units)   Date Value   12/03/2020 Positive (A)     ECG Results          EKG 12-lead (Final result)  Result time 12/11/20 15:51:01    Final result by Interface, Lab In [facility] (12/11/20 15:51:01)                 Narrative:    Test Reason : R07.9,    Vent. Rate : 103 BPM     Atrial Rate : 103 BPM     P-R Int : 166 ms          QRS Dur : 090 ms      QT Int : 336 ms       P-R-T Axes : 075 084 047 degrees     QTc Int : 440 ms    Sinus tachycardia  Otherwise normal ECG  When compared with ECG of 10-DEC-2020 21:53,  No significant change was found  Confirmed by SARAH MUÑOZ MD ([id number])  on 12/11/2020 3:50:50 PM    Referred By: JAYCE   SELF           Confirmed By:SARAH MUÑOZ MD                             EKG 12-lead (Final result)  Result time 12/11/20 15:47:54    Final result by Interface, Lab In [facility] (12/11/20 15:47:54)                 Narrative:    Test Reason : U07.1,J06.9,    Vent. Rate : 110 BPM     Atrial Rate : 110 BPM     P-R Int : 156 ms          QRS Dur : 084 ms      QT Int : 310 ms       P-R-T Axes : 065 092 021 degrees     QTc Int : 419 ms    Sinus tachycardia  Right atrial enlargement  Rightward axis  Borderline Abnormal ECG  When compared with ECG of 09-SEP-2020 20:18,  Nonspecific T wave abnormality no longer evident in Lateral leads  Confirmed by SARAH MUÑOZ MD ([id number]) on 12/11/2020 3:47:45 PM    Referred By: JAYCE   SELF           Confirmed By:SARAH MUÑOZ MD                            X-Ray Hips Bilateral 2 View Inc AP Pelvis  Narrative: EXAMINATION:  XR HIPS BILATERAL 2 VIEW INCL AP PELVIS    CLINICAL HISTORY:  fall, lower back pain;    TECHNIQUE:  AP view of the pelvis and frogleg lateral views of both hips were performed.    COMPARISON:  None.    FINDINGS:  No acute fractures.  No malalignment.  Preserved right and left SI joints and hip joint spaces.  Preserved right and left femoral head contours.  Impression: No fracture.    Electronically signed by: Gurmeet Lozano  Date:    12/11/2020  Time:    11:46  X-Ray Chest AP Portable  Narrative: EXAMINATION:  AP PORTABLE CHEST    CLINICAL HISTORY:  covid;    TECHNIQUE:  AP portable chest radiograph was submitted.    COMPARISON:  12/03/2020    FINDINGS:  AP portable chest radiograph demonstrates mild enlargement of the cardiac silhouette.  There is subtle asymmetric density in the left retrocardiac region.  Findings may represent atelectasis or infiltrate.  There is no pneumothorax or pleural effusion.  Cholecystectomy clips are present.  Impression: As above described.    Electronically signed by: Sahara  Jesus  Date:    12/11/2020  Time:    00:41      Assessment/Plan:      Active Diagnoses:    Diagnosis Date Noted POA    PRINCIPAL PROBLEM:  COVID-19 virus infection [U07.1] 12/11/2020 Yes    Acute respiratory failure with hypoxia [J96.01] 12/13/2020 Yes    Back pain [M54.9] 12/12/2020 Yes    Essential hypertension [I10] 12/11/2020 Yes     Chronic      Problems Resolved During this Admission:       Overview / ICU Course:    Aniket Ac is a 35 y.o. female admitted for COVID-19 virus infection.    Inpatient Medications Prescribed for Management of Current Problems:     Scheduled Meds:    ascorbic acid (vitamin C)  500 mg Oral BID    cefTRIAXone (ROCEPHIN) IVPB  1 g Intravenous QHS    cetirizine  10 mg Oral Daily    dexAMETHasone  6 mg Oral Daily    enoxaparin  40 mg Subcutaneous Q24H    hydroCHLOROthiazide  12.5 mg Oral Daily    lidocaine  1 patch Transdermal Q24H    methocarbamoL  500 mg Oral QID    multivitamin  1 tablet Oral Daily    mupirocin   Nasal BID    pantoprazole  40 mg Oral Daily    remdesivir infusion  100 mg Intravenous Q24H    vitamin D  1,000 Units Oral Daily     Continuous Infusions:   As Needed: acetaminophen, albuterol, benzonatate, calcium carbonate, dextromethorphan-guaifenesin  mg/5 ml, dextrose 50%, dextrose 50%, glucagon (human recombinant), glucose, glucose, hydrOXYzine HCL, ketorolac, loperamide, melatonin, ondansetron, sodium chloride 0.9%    Assessment and Plan by Problem    COVID-19 Virus Infection:  Viral Pneumonia due to COVID-19:  -Pt tested for COVID-19 and noted to be positive on 12/3  -Isolation: Airborne/Droplet. Surgical mask on patient. Notify Infection Control  -Management: per [facility] COVID Treatment Protocol (4/15/20)  -Monitoring: Telemetry & Continuous Pulse Oximetry  -Antibiotics: started on ceftriaxone 1g Q24h x 5 days and azithromycin 500mg po x1, then 250mg po daily x 4 days  -Nutrition:               -Multivitamin PO daily              -Add  Boost supplement              -Vitamin D 1000IU daily if deficient              -Ascorbic acid 500mg PO bid  -Supportive Care:              -Acetaminophen 650mg PO Q6hr PRN fever/headache              -Loperamide PRN viral diarrhea              -Robitussin, tessalon perls for cough              -IVF if indicated, restrictive strategy preferred, no maintenance IV if able   -Investigational Therapies:              -Due to hypoxia, pt started on dexamethasone 6mg PO daily up to 10 days or until pt is discharged from hospital (whichever is sooner)              -Pt meets criteria for remdesivir - started. Improvig     Acute Hypoxemic Respiratory Failure:  -currently requiring 1 L O2 via nasal cannula  -RT consult via Respiratory Communication for COVID Protocols  -If wheezing, albuterol INH Q6h scheduled & PRN  -Incentive Spirometer Q4h  -Flutter Valve Q4h  -Continuous pulse oximetry; titrate oxygen to keep sats between 92-96%  -Wean off O2 as tolerated    -Supplemental O2 via LFNC, VentiMask, or HFNC (see Respiratory Support Oxygen Therapies)  -Proning Protocol if patient is a candidate with GCS >13 and able to self-prone (see HM Proning Protocol)  -If deterioration, may warrant trial of NIPPV and transfer to neg pressure room or immediate ICU consult     Hypertension:  -stable  -Continue hydrochlorothiazide 12.5 mg p.o. daily  -monitor vitals q4h  -SBP goal of <160 in hospital     Fall/back pain:  -reports she suffered a mechanical fall while walking to the bathroom in the ED and broke the fall with her hip  -will order x-ray pelvis/hip  -consider MRI if symptoms persist  -pain control with lidocaine patch, Tylenol, Toradol p.r.n.  -Improved with Robaxin.   -OT/PT       Diet: Diet Adult Regular (IDDSI Level 7)  GI Prophylaxis: Indicated due to multiple minor risk factors  Significant LDAs:   IV Access Type: Peripheral  Urinary Catheter Indication if present: Patient Does Not Have Urinary Catheter  Other  Lines/Tubes/Drains:    HIGH RISK CONDITION(S):   Patient has a condition that poses threat to life and bodily function: Severe Respiratory Distress     Goals of Care:    Previous admission:  12/3/20  Likely prognosis:  Fair  Code Status: Full Code  Comfort Only: No  Hospice: No  Goals at discharge: remain at home, with physician follow-up    Discharge Planning   XIAO: 12/15/2020     Code Status: Full Code   Is the patient medically ready for discharge?: No    Reason for patient still in hospital (select all that apply): Patient trending condition and Treatment  Discharge Plan A: Home        VTE Risk Mitigation (From admission, onward)         Ordered     enoxaparin injection 40 mg  Every 24 hours      12/11/20 0241     IP VTE HIGH RISK PATIENT  Once      12/11/20 0241     Place INOCENCIO hose  Until discontinued      12/11/20 0241     Place sequential compression device  Until discontinued      12/11/20 0241                   Shirley Ta MD  Department of Hospital Medicine   [facility] - ICU 15 [facility]

## 2020-12-14 NOTE — PLAN OF CARE
Pt AAOx4. Remains free of hospital acquired illness and without unit falls. Continues to receive IV antiviral medication.

## 2020-12-14 NOTE — PT/OT/SLP EVAL
"Occupational Therapy   Co-Evaluation    Name: Aniket Ac  MRN: [id number]  Admitting Diagnosis:  COVID-19 virus infection    * No surgery found *    Recommendations:     Discharge Recommendations: home  Discharge Equipment Recommendations:  none  Barriers to discharge:  None    Assessment:     Aniket Ac is a 35 y.o. female with a medical diagnosis of COVID-19 virus infection.  She presents with pain and is agreeable to participate with encouragement. Patient reluctant to participate further due to pain. Patient appears upset during evaluation with current medical situation and care received thus far, therapeutic listening provided. Performance deficits affecting function: weakness, impaired endurance, impaired self care skills, impaired functional mobilty, gait instability, impaired balance, decreased safety awareness, pain, impaired cardiopulmonary response to activity.      Rehab Prognosis: Good; patient would benefit from acute skilled OT services to address these deficits and reach maximum level of function.       Plan:     Patient to be seen 2 x/week to address the above listed problems via self-care/home management, therapeutic activities, therapeutic exercises  · Plan of Care Expires: 01/14/21  · Plan of Care Reviewed with: patient    Subjective     Chief Complaint: "I told you guys I had pain, I don't want to do anymore." "I don't get any help. I just do everything by myself."  Patient/Family Comments/goals: To feel better and return home    Occupational Profile:  Living Environment: Patient lives with her  and 3 children (13, 12, 11) in a two story home with 0 steps to enter. Patient's bed/bath are located on the 2nd floor with bilateral handrails long term up flight of stairs to 2nd floor and L handrail for the other half. Patient's bathroom has a tub/shower.  Previous level of function: Independent  Roles and Routines: Mother, wife. Patient drives and does not work.   Equipment Used at " Home:  none  Assistance upon Discharge: Family    Pain/Comfort:  · Pain Rating 1: 7/10  · Location - Orientation 1: generalized  · Location 1: (back, hip, head, etc)  · Pain Addressed 1: Reposition, Cessation of Activity    Patients cultural, spiritual, Quaker conflicts given the current situation: no    Objective:     Communicated with: RN prior to session.  Patient found HOB elevated with blood pressure cuff, oxygen, pulse ox (continuous), SCD, telemetry(hep lock IV) upon OT entry to room.    General Precautions: Standard, airborne, contact, droplet, fall   Orthopedic Precautions:N/A   Braces: N/A     Occupational Performance:    Bed Mobility:    · Patient completed Supine to Sit to R side EOB with supervision  · Patient completed Sit to Supine with supervision    Functional Mobility/Transfers:  · Patient completed Sit <> Stand Transfer with supervision  with  no assistive device   · Patient declined to demonstrate toilet transfer but reported she recently used the restroom with no issues.  · Functional Mobility: ~24' with stand-by assistance and with no AD    Activities of Daily Living:  · Declined participation    Cognitive/Visual Perceptual:  Cognitive/Psychosocial Skills:     -       Oriented to: Person, Place, Time and Situation   -       Follows Commands/attention:Follows multistep  commands    Physical Exam:  Upper Extremity Range of Motion:     -       Right Upper Extremity: WFL  -       Left Upper Extremity: WFL  Upper Extremity Strength:    -       Right Upper Extremity: WFL  -       Left Upper Extremity: WFL    AMPAC 6 Click ADL:  AMPAC Total Score: 19    Treatment & Education:  Role of OT/evaluation  Call button for assistance    Patient left HOB elevated with all lines intact, call button in reach and RN notified    GOALS:   Multidisciplinary Problems     Occupational Therapy Goals        Problem: Occupational Therapy Goal    Goal Priority Disciplines Outcome Interventions   Occupational Therapy  Goal     OT, PT/OT Ongoing, Progressing    Description: Goals to be met by: 20     Patient will increase functional independence with ADLs by performing:    UE Dressing with [place].  LE Dressing with [place].  Grooming while standing with [place].  Toileting from toilet with [place] for hygiene and clothing management.   Toilet transfer to toilet with [place].                     History:     Past Medical History:   Diagnosis Date    Essential hypertension 2020       Past Surgical History:   Procedure Laterality Date     SECTION, CLASSIC      CHOLECYSTECTOMY      TUBAL LIGATION         Time Tracking:     OT Date of Treatment: 20  OT Start Time: 1015  OT Stop Time: 1024  OT Total Time (min): 9 min    Billable Minutes:Evaluation 9 minutes    Magda Dyer OT  2020

## 2020-12-14 NOTE — PT/OT/SLP EVAL
Physical Therapy Evaluation    Patient Name:  Aniket Ac   MRN:  [id number]    Recommendations:     Discharge Recommendations:  home   Discharge Equipment Recommendations: none   Barriers to discharge: None    Assessment:     Aniket Ac is a 35 y.o. female admitted with a medical diagnosis of COVID-19 virus infection.  She presents with the following impairments/functional limitations:  weakness, impaired endurance, decreased coordination, impaired self care skills, impaired functional mobilty, decreased lower extremity function, gait instability, impaired balance, pain, impaired cardiopulmonary response to activity. Pt evaluated on this date performing all mobility at SBA levels. Pt appeared guarded throughout this eval as she minimally verbalized reporting dissatisfaction with her care during this admission. Pt reported having a recent fall during this admission and felt it was d/t decreased assist provided during transfers to a BSC. Pt educated on benefit to continue utilizing NSG for transfers with OOB activity but pt voiced she will continue mobilizing to the bathroom unassisted to avoid incontinence. Pt would benefit from continued skilled acute PT 2x/wk to improve functional mobility.     Rehab Prognosis: Good; patient would benefit from acute skilled PT services to address these deficits and reach maximum level of function.    Recent Surgery: * No surgery found *      Plan:     During this hospitalization, patient to be seen 2 x/week to address the identified rehab impairments via gait training, therapeutic activities, therapeutic exercises, neuromuscular re-education and progress toward the following goals:    · Plan of Care Expires:  01/13/21    Subjective     Chief Complaint: Generalized 7/10 pain   Patient/Family Comments/goals: none noted   Pain/Comfort:  · Pain Rating 1: 7/10  · Location - Orientation 1: generalized    Patients cultural, spiritual, Taoist conflicts given the current  situation: no    Living Environment:  Pt lives with her  and children in a SSH with 0 NATALI.   Prior to admission, patients level of function was (I).  Equipment used at home: none.  DME owned (not currently used): none.  Upon discharge, patient will have assistance from family.    Objective:     Communicated with NSG prior to session.  Patient found HOB elevated with blood pressure cuff, oxygen, pulse ox (continuous), telemetry, SCD  upon PT entry to room.    General Precautions: Standard, airborne, contact, droplet, fall   Orthopedic Precautions:N/A   Braces: N/A     Exams:  · Cognitive Exam:  Patient is oriented to Person, Place, Time and Situation  · Gross Motor Coordination:  WFL  · RLE ROM: WFL  · RLE Strength: 4+/5  · LLE ROM: WFL  · LLE Strength: 4+/5    Functional Mobility:  · Bed Mobility:     · Scooting: supervision  · Bridging: supervision  · Supine to Sit: supervision  · Sit to Supine: supervision  · Transfers:     · Sit to Stand:  supervision with no AD  · Toilet Transfer: declined to demonstrate   · Gait: 24ft SBA without AD, declined further distances   · Balance: SBA    Therapeutic Activities and Exercises:   - Pt educated on:   -PT roles, expectations, and POC    -Safety with mobility   -Benefits of OOB activities to increase strength and functional mobility    -Performing ther ex for increasing LE ROM and strength   -Discharge recommendations     AM-PAC 6 CLICK MOBILITY  Total Score:18     Patient left HOB elevated with all lines intact and call button in reach.    GOALS:   Multidisciplinary Problems     Physical Therapy Goals        Problem: Physical Therapy Goal    Goal Priority Disciplines Outcome Goal Variances Interventions   Physical Therapy Goal     PT, PT/OT Ongoing, Progressing     Description: Goals to be met by: 2021    Patient will increase functional independence with mobility by performin. Supine to sit with Modified [place]  2. Sit to supine with Modified  [place]  3. Sit to stand transfer with Modified [place]  4. Gait  x 100 feet with Stand-by Assistance using LRAD  5. Lower extremity exercise program x15 reps, with independence                      History:     Past Medical History:   Diagnosis Date    Essential hypertension 2020       Past Surgical History:   Procedure Laterality Date     SECTION, CLASSIC      CHOLECYSTECTOMY      TUBAL LIGATION         Time Tracking:     PT Received On: 20  PT Start Time: 1015     PT Stop Time: 1025  PT Total Time (min): 10 min     Billable Minutes: Evaluation 10      Randall Schofield, PT  2020

## 2020-12-14 NOTE — PLAN OF CARE
Problem: Occupational Therapy Goal  Goal: Occupational Therapy Goal  Description: Goals to be met by: 12/28/20     Patient will increase functional independence with ADLs by performing:    UE Dressing with [place].  LE Dressing with [place].  Grooming while standing with [place].  Toileting from toilet with [place] for hygiene and clothing management.   Toilet transfer to toilet with [place].    Outcome: Ongoing, Progressing    OT evaluation completed and POC established.  Magda Dyer OT  12/14/2020

## 2020-12-15 LAB
ALBUMIN SERPL BCP-MCNC: 3 G/DL (ref 3.5–5.2)
ALP SERPL-CCNC: 48 U/L (ref 55–135)
ALT SERPL W/O P-5'-P-CCNC: 27 U/L (ref 10–44)
ANION GAP SERPL CALC-SCNC: 14 MMOL/L (ref 8–16)
AST SERPL-CCNC: 24 U/L (ref 10–40)
BILIRUB SERPL-MCNC: 0.5 MG/DL (ref 0.1–1)
BUN SERPL-MCNC: 20 MG/DL (ref 6–20)
CALCIUM SERPL-MCNC: 9 MG/DL (ref 8.7–10.5)
CHLORIDE SERPL-SCNC: 99 MMOL/L (ref 95–110)
CO2 SERPL-SCNC: 24 MMOL/L (ref 23–29)
CREAT SERPL-MCNC: 0.9 MG/DL (ref 0.5–1.4)
EST. GFR  (AFRICAN AMERICAN): >60 ML/MIN/1.73 M^2
EST. GFR  (NON AFRICAN AMERICAN): >60 ML/MIN/1.73 M^2
GLUCOSE SERPL-MCNC: 95 MG/DL (ref 70–110)
MAGNESIUM SERPL-MCNC: 2.6 MG/DL (ref 1.6–2.6)
PHOSPHATE SERPL-MCNC: 4.4 MG/DL (ref 2.7–4.5)
POTASSIUM SERPL-SCNC: 3.4 MMOL/L (ref 3.5–5.1)
PROT SERPL-MCNC: 7.5 G/DL (ref 6–8.4)
SODIUM SERPL-SCNC: 137 MMOL/L (ref 136–145)

## 2020-12-15 PROCEDURE — 99900035 HC TECH TIME PER 15 MIN (STAT)

## 2020-12-15 PROCEDURE — 27000221 HC OXYGEN, UP TO 24 HOURS

## 2020-12-15 PROCEDURE — 25000003 PHARM REV CODE 250: Performed by: PHYSICIAN ASSISTANT

## 2020-12-15 PROCEDURE — 20600001 HC STEP DOWN PRIVATE ROOM

## 2020-12-15 PROCEDURE — 93005 ELECTROCARDIOGRAM TRACING: CPT

## 2020-12-15 PROCEDURE — 83735 ASSAY OF MAGNESIUM: CPT

## 2020-12-15 PROCEDURE — 93010 ELECTROCARDIOGRAM REPORT: CPT | Mod: ,,, | Performed by: INTERNAL MEDICINE

## 2020-12-15 PROCEDURE — 25000003 PHARM REV CODE 250: Performed by: HOSPITALIST

## 2020-12-15 PROCEDURE — 80053 COMPREHEN METABOLIC PANEL: CPT

## 2020-12-15 PROCEDURE — 84100 ASSAY OF PHOSPHORUS: CPT

## 2020-12-15 PROCEDURE — 93010 EKG 12-LEAD: ICD-10-PCS | Mod: ,,, | Performed by: INTERNAL MEDICINE

## 2020-12-15 PROCEDURE — 63600175 PHARM REV CODE 636 W HCPCS: Performed by: INTERNAL MEDICINE

## 2020-12-15 PROCEDURE — 36415 COLL VENOUS BLD VENIPUNCTURE: CPT

## 2020-12-15 PROCEDURE — 99233 PR SUBSEQUENT HOSPITAL CARE,LEVL III: ICD-10-PCS | Mod: 95,,, | Performed by: INTERNAL MEDICINE

## 2020-12-15 PROCEDURE — 94761 N-INVAS EAR/PLS OXIMETRY MLT: CPT

## 2020-12-15 PROCEDURE — 63600175 PHARM REV CODE 636 W HCPCS: Performed by: PHYSICIAN ASSISTANT

## 2020-12-15 PROCEDURE — 99233 SBSQ HOSP IP/OBS HIGH 50: CPT | Mod: 95,,, | Performed by: INTERNAL MEDICINE

## 2020-12-15 PROCEDURE — 25000003 PHARM REV CODE 250: Performed by: INTERNAL MEDICINE

## 2020-12-15 RX ORDER — POTASSIUM CHLORIDE 20 MEQ/1
40 TABLET, EXTENDED RELEASE ORAL 3 TIMES DAILY
Status: COMPLETED | OUTPATIENT
Start: 2020-12-15 | End: 2020-12-15

## 2020-12-15 RX ADMIN — SODIUM CHLORIDE 250 ML: 0.9 INJECTION, SOLUTION INTRAVENOUS at 01:12

## 2020-12-15 RX ADMIN — POTASSIUM CHLORIDE 40 MEQ: 1500 TABLET, EXTENDED RELEASE ORAL at 09:12

## 2020-12-15 RX ADMIN — METHOCARBAMOL TABLETS 500 MG: 500 TABLET, COATED ORAL at 08:12

## 2020-12-15 RX ADMIN — KETOROLAC TROMETHAMINE 15 MG: 15 INJECTION, SOLUTION INTRAMUSCULAR; INTRAVENOUS at 09:12

## 2020-12-15 RX ADMIN — CHOLECALCIFEROL (VITAMIN D3) 25 MCG (1,000 UNIT) TABLET 1000 UNITS: TABLET at 08:12

## 2020-12-15 RX ADMIN — PANTOPRAZOLE SODIUM 40 MG: 40 TABLET, DELAYED RELEASE ORAL at 08:12

## 2020-12-15 RX ADMIN — REMDESIVIR 100 MG: 100 INJECTION, POWDER, LYOPHILIZED, FOR SOLUTION INTRAVENOUS at 05:12

## 2020-12-15 RX ADMIN — HYDROCHLOROTHIAZIDE 12.5 MG: 12.5 TABLET ORAL at 08:12

## 2020-12-15 RX ADMIN — POTASSIUM CHLORIDE 40 MEQ: 1500 TABLET, EXTENDED RELEASE ORAL at 03:12

## 2020-12-15 RX ADMIN — KETOROLAC TROMETHAMINE 15 MG: 15 INJECTION, SOLUTION INTRAMUSCULAR; INTRAVENOUS at 08:12

## 2020-12-15 RX ADMIN — CETIRIZINE HYDROCHLORIDE 10 MG: 10 TABLET, FILM COATED ORAL at 08:12

## 2020-12-15 RX ADMIN — MUPIROCIN: 20 OINTMENT TOPICAL at 08:12

## 2020-12-15 RX ADMIN — OXYCODONE HYDROCHLORIDE AND ACETAMINOPHEN 500 MG: 500 TABLET ORAL at 08:12

## 2020-12-15 RX ADMIN — ENOXAPARIN SODIUM 40 MG: 40 INJECTION SUBCUTANEOUS at 08:12

## 2020-12-15 RX ADMIN — ACETAMINOPHEN 650 MG: 325 TABLET ORAL at 01:12

## 2020-12-15 RX ADMIN — DEXAMETHASONE 6 MG: 4 TABLET ORAL at 08:12

## 2020-12-15 RX ADMIN — MUPIROCIN: 20 OINTMENT TOPICAL at 09:12

## 2020-12-15 RX ADMIN — Medication 1 TABLET: at 08:12

## 2020-12-15 NOTE — NURSING
"Explained 6 min walk test to patient. Pt refusing to get OOB at this time and does not want to do test right now. States she is too drowsy "from the medicine this morning and it needs to pass before she can walk."    "

## 2020-12-15 NOTE — NURSING
Asked Dr. Ta about scheduled robaxin to see if medication should be given with BP running low and pt c/o feeling tired after getting morning meds.   MD ordered to hold robaxin.   While messaging MD, pt called  asking c/o pain. Pt c/o 8 out of 10 constant chest pain as well as a headache. BP checked, SBP 81.   Dr. Ta notified. IVF already ordered, HCTZ d/c'd. Will administer fluids and tylenol for headache. Pt asking for EKG. MD ordered.     Went back into room to do EKG, pt sitting up in bed eating hamburger and fries that her aunt brought to her. EKG completed.

## 2020-12-15 NOTE — PROGRESS NOTES
[facility] - ICU 15 [facility] Medicine  Telemedicine Progress Note    Patient Name: Aniket Ac  MRN: [id number]  Patient Class: IP- Inpatient   Admission Date: 12/10/2020  Length of Stay: 2 days  Attending Physician: Shirley Ta MD  Primary Care Provider: ESTEFANÍA GarcíaKOFFI    [facility] Medicine Team: [facility] VIRTUAL TEAM 10 Shirley Ta MD  Virtual Telemedicine Progress Note  Patient was transferred to the telemedicine service on: 12/12/2020  Chief complaint: COVID-19 virus infection  The patient location is: [facility]/[address] [address]  The patient arrived at: 12/10/2020  9:47 PM  Start time: 1546  End time:  1552  Total time spent with patient: 6 min  Present with the patient at the time of the telemed/virtual assessment: n/a  I have assessed findings virtually using a telemedicine platform and with assistance of the bedside nurse or telemedicine presenter.  The attending portion of this evaluation, treatment, and documentation was performed per Shirley Ta MD via audiovisual.    Subjective:     Admission CC:   Chief Complaint   Patient presents with    COVID-19 Concerns     pt tested positive for COVID on 12/3/2020. pt c/o fever, fatigue, dehydration, chest pain, and lower back pain.      Follow up visit for: COVID-19 virus infection    Interval History / Events Overnight:   The patient is able to provide adequate history. Additional history was obtained from chart review and nurse. No significant events reported by Nursing. patient with hypotension and dizziness.  Patient complains of back pain. Symptoms have been unchanged since yesterday. Associated symptoms include: pleuritic chest pain and fatigue. Symptoms are stable. Alleviating factors include: medication: Robaxin and rest.  SpO2 9&% on RA    Data reviewed 12/15/2020: Lab test(s) reviewed: CRP improved    Review of Systems   Constitutional: Positive for fatigue. Negative for fever.   Respiratory: Positive for shortness of breath.     Cardiovascular: Positive for chest pain.     Objective:     Vital Signs (Most Recent):  Temp: 98.5 °F (36.9 °C) (12/15/20 0409)  Pulse: 69 (12/15/20 1512)  Resp: 18 (12/15/20 1511)  BP: 95/61(95/61) (12/15/20 1511)  SpO2: 95 % (12/15/20 1512) Vital Signs (24h Range):  Temp:  [97.8 °F (36.6 °C)-98.5 °F (36.9 °C)] 98.5 °F (36.9 °C)  Pulse:  [53-91] 69  Resp:  [18-20] 18  SpO2:  [94 %-100 %] 95 %  BP: ()/(51-75) 95/61     Weight: 111.4 kg (245 lb 8 oz)  Body mass index is 36.25 kg/m².    Intake/Output Summary (Last 24 hours) at 12/15/2020 1559  Last data filed at 12/15/2020 0900  Gross per 24 hour   Intake 540 ml   Output --   Net 540 ml      Physical Exam  Constitutional:       General: She is not in acute distress.     Appearance: Normal appearance. She is not diaphoretic.   Eyes:      General: Lids are normal. No scleral icterus.        Right eye: No discharge.         Left eye: No discharge.      Conjunctiva/sclera: Conjunctivae normal.   Neck:      Trachea: Phonation normal.   Cardiovascular:      Rate and Rhythm: Normal rate.   Pulmonary:      Effort: Pulmonary effort is normal. No tachypnea, accessory muscle usage or respiratory distress.   Abdominal:      General: There is no distension.   Neurological:      Mental Status: She is alert. She is not disoriented.   Psychiatric:         Attention and Perception: Attention normal.         Mood and Affect: Affect normal.         Behavior: Behavior is cooperative.         Significant Labs:   .  Recent Labs   Lab 12/11/20 0018 12/13/20 0035 12/14/20  0422   WBC 4.95 5.72 4.62   HGB 15.3 14.0 13.4   HCT 46.8 42.8 41.7    300 335     Recent Labs   Lab 12/11/20  0018 12/13/20 0035 12/14/20  0422   GRAN 62.0  3.1 73.4*  4.2 55.1  2.5   LYMPH 31.9  1.6 21.5  1.2 37.4  1.7   MONO 5.3  0.3 4.5  0.3 6.9  0.3   EOS 0.0 0.0 0.0     Recent Labs   Lab 12/13/20  0510 12/14/20  0422 12/15/20  0509    140 137   K 3.1* 3.5 3.4*    99 99   CO2 22*  28 24   BUN 25* 24* 20   CREATININE 0.9 1.0 0.9    87 95   CALCIUM 8.9 8.8 9.0   ALBUMIN 3.0* 3.0* 3.0*   MG 2.8* 2.9* 2.6   PHOS 5.4* 5.0* 4.4     Recent Labs   Lab 12/11/20  0902  12/13/20  0510 12/14/20  0422 12/15/20  0509   ALKPHOS  --    < > 54* 49* 48*   ALT  --    < > 34 29 27   AST  --    < > 30 28 24   PROT  --    < > 7.4 7.5 7.5   BILITOT  --    < > 0.4 0.4 0.5   INR 0.9  --   --   --   --     < > = values in this interval not displayed.     Procalcitonin (ng/mL)   Date Value   12/11/2020 0.22     Lactate (Lactic Acid) (mmol/L)   Date Value   12/11/2020 0.7   12/11/2020 2.7 (H)     BNP (pg/mL)   Date Value   12/11/2020 <10   09/09/2020 20   03/26/2020 <10     CRP (mg/L)   Date Value   12/14/2020 36.1 (H)   12/13/2020 68.0 (H)   12/11/2020 126.2 (H)     Sed Rate (mm/Hr)   Date Value   12/11/2020 89 (H)     D-Dimer (mg/L FEU)   Date Value   12/14/2020 0.65 (H)   12/11/2020 0.64 (H)   03/26/2020 0.31     Ferritin (ng/mL)   Date Value   12/14/2020 647 (H)   12/11/2020 1,900 (H)     LD (U/L)   Date Value   12/11/2020 508 (H)     Troponin I (ng/mL)   Date Value   12/11/2020 0.009   09/09/2020 <0.006   03/26/2020 <0.006     CPK (U/L)   Date Value   12/11/2020 157     Results for orders placed or performed during the hospital encounter of 12/10/20   Vitamin D   Result Value Ref Range    Vit D, 25-Hydroxy 29 (L) 30 - 96 ng/mL     SARS-CoV2 (COVID-19) Qualitative PCR (no units)   Date Value   03/25/2020 Not Detected     POC Rapid COVID (no units)   Date Value   12/03/2020 Positive (A)     ECG Results          EKG 12-lead (Final result)  Result time 12/11/20 15:51:01    Final result by Interface, Lab In [facility] (12/11/20 15:51:01)                 Narrative:    Test Reason : R07.9,    Vent. Rate : 103 BPM     Atrial Rate : 103 BPM     P-R Int : 166 ms          QRS Dur : 090 ms      QT Int : 336 ms       P-R-T Axes : 075 084 047 degrees     QTc Int : 440 ms    Sinus tachycardia  Otherwise normal ECG  When  compared with ECG of 10-DEC-2020 21:53,  No significant change was found  Confirmed by SARAH MUÑOZ MD ([id number]) on 12/11/2020 3:50:50 PM    Referred By: JAYCE   SELF           Confirmed By:SARAH MUÑOZ MD                             EKG 12-lead (Final result)  Result time 12/11/20 15:47:54    Final result by Interface, Lab In [facility] (12/11/20 15:47:54)                 Narrative:    Test Reason : U07.1,J06.9,    Vent. Rate : 110 BPM     Atrial Rate : 110 BPM     P-R Int : 156 ms          QRS Dur : 084 ms      QT Int : 310 ms       P-R-T Axes : 065 092 021 degrees     QTc Int : 419 ms    Sinus tachycardia  Right atrial enlargement  Rightward axis  Borderline Abnormal ECG  When compared with ECG of 09-SEP-2020 20:18,  Nonspecific T wave abnormality no longer evident in Lateral leads  Confirmed by SRAAH MUÑOZ MD ([id number]) on 12/11/2020 3:47:45 PM    Referred By: JAYCE   SELF           Confirmed By:SARAH MUÑOZ MD                            X-Ray Hips Bilateral 2 View Inc AP Pelvis  Narrative: EXAMINATION:  XR HIPS BILATERAL 2 VIEW INCL AP PELVIS    CLINICAL HISTORY:  fall, lower back pain;    TECHNIQUE:  AP view of the pelvis and frogleg lateral views of both hips were performed.    COMPARISON:  None.    FINDINGS:  No acute fractures.  No malalignment.  Preserved right and left SI joints and hip joint spaces.  Preserved right and left femoral head contours.  Impression: No fracture.    Electronically signed by: Gurmeet Lozano  Date:    12/11/2020  Time:    11:46  X-Ray Chest AP Portable  Narrative: EXAMINATION:  AP PORTABLE CHEST    CLINICAL HISTORY:  covid;    TECHNIQUE:  AP portable chest radiograph was submitted.    COMPARISON:  12/03/2020    FINDINGS:  AP portable chest radiograph demonstrates mild enlargement of the cardiac silhouette.  There is subtle asymmetric density in the left retrocardiac region.  Findings may represent atelectasis or infiltrate.  There is no pneumothorax or pleural effusion.   Cholecystectomy clips are present.  Impression: As above described.    Electronically signed by: Sahara Lee  Date:    12/11/2020  Time:    00:41      Assessment/Plan:      Active Diagnoses:    Diagnosis Date Noted POA    PRINCIPAL PROBLEM:  COVID-19 virus infection [U07.1] 12/11/2020 Yes    Acute respiratory failure with hypoxia [J96.01] 12/13/2020 Yes    Back pain [M54.9] 12/12/2020 Yes    Essential hypertension [I10] 12/11/2020 Yes     Chronic      Problems Resolved During this Admission:       Overview / ICU Course:    Aniket Ac is a 35 y.o. female admitted for COVID-19 virus infection.    Inpatient Medications Prescribed for Management of Current Problems:     Scheduled Meds:    ascorbic acid (vitamin C)  500 mg Oral BID    cetirizine  10 mg Oral Daily    dexAMETHasone  6 mg Oral Daily    enoxaparin  40 mg Subcutaneous Q24H    lidocaine  1 patch Transdermal Q24H    methocarbamoL  500 mg Oral QID    multivitamin  1 tablet Oral Daily    mupirocin   Nasal BID    pantoprazole  40 mg Oral Daily    remdesivir infusion  100 mg Intravenous Q24H    vitamin D  1,000 Units Oral Daily     Continuous Infusions:   As Needed: acetaminophen, albuterol, benzonatate, calcium carbonate, dextromethorphan-guaifenesin  mg/5 ml, dextrose 50%, dextrose 50%, glucagon (human recombinant), glucose, glucose, hydrOXYzine HCL, ketorolac, loperamide, melatonin, ondansetron, sodium chloride 0.9%    Assessment and Plan by Problem    COVID-19 Virus Infection:  Viral Pneumonia due to COVID-19:  -Pt tested for COVID-19 and noted to be positive on 12/3  -Isolation: Airborne/Droplet. Surgical mask on patient. Notify Infection Control  -[facility] (4/15/[facility])  -Monitoring: Telemetry & Continuous Pulse Oximetry  -Antibiotics: started on ceftriaxone 1g Q24h x 5 days and azithromycin 500mg po x1, then 250mg po daily x 4 days  -Nutrition:               -Multivitamin PO daily               -Add Boost supplement              -Vitamin D 1000IU daily if deficient              -Ascorbic acid 500mg PO bid  -Supportive Care:              -Acetaminophen 650mg PO Q6hr PRN fever/headache              -Loperamide PRN viral diarrhea              -Robitussin, tessalon perls for cough              -IVF if indicated, restrictive strategy preferred, no maintenance IV if able   -Investigational Therapies:              -Due to hypoxia, pt started on dexamethasone 6mg PO daily up to 10 days or until pt is discharged from hospital (whichever is sooner)              -Pt meets criteria for remdesivir - started. Improving   -persistent chest pain; EKG nonischemic.     Acute Hypoxemic Respiratory Failure: resolved  -RT consult via Respiratory Communication for COVID Protocols  -If wheezing, albuterol INH Q6h scheduled & PRN  -Incentive Spirometer Q4h  -Flutter Valve Q4h  -Continuous pulse oximetry; titrate oxygen to keep sats between 92-96%  -Wean off O2 as tolerated    -Supplemental O2 via LFNC, VentiMask, or HFNC (see Respiratory Support Oxygen Therapies)  -Proning Protocol if patient is a candidate with GCS >13 and able to self-prone (see HM Proning Protocol)  -If deterioration, may warrant trial of NIPPV and transfer to neg pressure room or immediate ICU consult     Hypertension:  -stable  -Continue hydrochlorothiazide 12.5 mg p.o. daily  -monitor vitals q4h  -SBP goal of <160 in hospital  - hypotensive and volume depleted 12/15; stopped HCTZ. Trial of NS bolus. BP improved.     Fall/back pain:  -reports she suffered a mechanical fall while walking to the bathroom in the ED and broke the fall with her hip  -will order x-ray pelvis/hip  -consider MRI if symptoms persist  -pain control with lidocaine patch, Tylenol, Toradol p.r.n.  -Improved with Robaxin.   -OT/PT       Diet: Diet Adult Regular (IDDSI Level 7)  GI Prophylaxis: Indicated due to multiple minor risk factors  Significant LDAs:   IV Access Type:  Peripheral  Urinary Catheter Indication if present: Patient Does Not Have Urinary Catheter  Other Lines/Tubes/Drains:    HIGH RISK CONDITION(S):   Patient has a condition that poses threat to life and bodily function: Severe Respiratory Distress     Goals of Care:    Previous admission:  12/3/20  Likely prognosis:  Fair  Code Status: Full Code  Comfort Only: No  Hospice: No  Goals at discharge: remain at home, with physician follow-up    Discharge Planning   XIAO: 12/15/2020     Code Status: Full Code   Is the patient medically ready for discharge?: No    Reason for patient still in hospital (select all that apply): Patient trending condition and Treatment  Discharge Plan A: Home        VTE Risk Mitigation (From admission, onward)         Ordered     enoxaparin injection 40 mg  Every 24 hours      12/11/20 0241     IP VTE HIGH RISK PATIENT  Once      12/11/20 0241     Place INOCENCIO hose  Until discontinued      12/11/20 0241     Place sequential compression device  Until discontinued      12/11/20 0241                   Shirley Ta MD  Department of Hospital Medicine   [facility] - ICU 15 [facility]

## 2020-12-15 NOTE — NURSING
Order for 6 minute walk test noted. Into room explained to patient procedure for test and rational. Patient states she is tired and does not want to participate in test tonight.

## 2020-12-15 NOTE — NURSING
Home Oxygen Evaluation    Date Performed: 12/15/2020    1) Patient's Home O2 Sat on room air, while at rest:  97        If O2 sats on room air at rest are 88% or below, patient qualifies. No additional testing needed. Document N/A in steps 2 and 3. If 89% or above, complete steps 2.      2) Patient's O2 Sat on room air while exercisin%        If O2 sats on room air while exercising remain 89% or above patient does not qualify, no further testing needed Document N/A in step 3. If O2 sats on room air while exercising are 88% or below, continue to step 3.      3) Patient's O2 Sat while exercising on O2: n/a     (Must show improvement from #2 for patients to qualify)    If O2 sats improve on oxygen, patient qualifies for portable oxygen. If not, the patient does not qualify.

## 2020-12-15 NOTE — NURSING
"Called into room by pt, saying that monitor has been beeping for 30 minutes and is upset that no one has come to check on her.   Last nursing rounds completed at 1100. Pt had no needs at the time.   PCT, Zo, took BP at 1110. Apparently BP was 88/54. Was not notified by PCT of BP.  Pt did not call  until 1140.   1145 Pt sitting up in bed, denies dizziness. Only complaint is a "headache from all that beeping." BP taken in bilat arms. Doppler used for bp in left arm, 90/dooppler.   Will update Dr. Ta  "

## 2020-12-15 NOTE — PLAN OF CARE
Problem: Fall Injury Risk  Goal: Absence of Fall and Fall-Related Injury  Outcome: Ongoing, Progressing     Patient slept well through the night. Free from falls. No distress noted. Respirations even and unlabored. Bed in lowest position. Wheels locked. Call bell in reach.

## 2020-12-16 VITALS
HEIGHT: 69 IN | WEIGHT: 257.19 LBS | DIASTOLIC BLOOD PRESSURE: 66 MMHG | RESPIRATION RATE: 18 BRPM | HEART RATE: 80 BPM | SYSTOLIC BLOOD PRESSURE: 110 MMHG | BODY MASS INDEX: 38.09 KG/M2 | OXYGEN SATURATION: 95 % | TEMPERATURE: 98 F

## 2020-12-16 PROBLEM — J96.01 ACUTE RESPIRATORY FAILURE WITH HYPOXIA: Status: RESOLVED | Noted: 2020-12-13 | Resolved: 2020-12-16

## 2020-12-16 LAB
ALBUMIN SERPL BCP-MCNC: 3.2 G/DL (ref 3.5–5.2)
ALP SERPL-CCNC: 56 U/L (ref 55–135)
ALT SERPL W/O P-5'-P-CCNC: 28 U/L (ref 10–44)
ANION GAP SERPL CALC-SCNC: 15 MMOL/L (ref 8–16)
AST SERPL-CCNC: 20 U/L (ref 10–40)
BACTERIA BLD CULT: NORMAL
BACTERIA BLD CULT: NORMAL
BASOPHILS # BLD AUTO: 0.05 K/UL (ref 0–0.2)
BASOPHILS NFR BLD: 0.9 % (ref 0–1.9)
BILIRUB SERPL-MCNC: 0.6 MG/DL (ref 0.1–1)
BUN SERPL-MCNC: 21 MG/DL (ref 6–20)
CALCIUM SERPL-MCNC: 9.4 MG/DL (ref 8.7–10.5)
CHLORIDE SERPL-SCNC: 99 MMOL/L (ref 95–110)
CO2 SERPL-SCNC: 24 MMOL/L (ref 23–29)
CREAT SERPL-MCNC: 0.9 MG/DL (ref 0.5–1.4)
CRP SERPL-MCNC: 14.1 MG/L (ref 0–8.2)
D DIMER PPP IA.FEU-MCNC: 0.58 MG/L FEU
DIFFERENTIAL METHOD: ABNORMAL
EOSINOPHIL # BLD AUTO: 0 K/UL (ref 0–0.5)
EOSINOPHIL NFR BLD: 0.3 % (ref 0–8)
ERYTHROCYTE [DISTWIDTH] IN BLOOD BY AUTOMATED COUNT: 14.1 % (ref 11.5–14.5)
EST. GFR  (AFRICAN AMERICAN): >60 ML/MIN/1.73 M^2
EST. GFR  (NON AFRICAN AMERICAN): >60 ML/MIN/1.73 M^2
FERRITIN SERPL-MCNC: 587 NG/ML (ref 20–300)
GLUCOSE SERPL-MCNC: 66 MG/DL (ref 70–110)
HCT VFR BLD AUTO: 44.6 % (ref 37–48.5)
HGB BLD-MCNC: 14.3 G/DL (ref 12–16)
IMM GRANULOCYTES # BLD AUTO: 0.11 K/UL (ref 0–0.04)
IMM GRANULOCYTES NFR BLD AUTO: 1.9 % (ref 0–0.5)
LYMPHOCYTES # BLD AUTO: 2.3 K/UL (ref 1–4.8)
LYMPHOCYTES NFR BLD: 39.1 % (ref 18–48)
MAGNESIUM SERPL-MCNC: 2.5 MG/DL (ref 1.6–2.6)
MCH RBC QN AUTO: 24.2 PG (ref 27–31)
MCHC RBC AUTO-ENTMCNC: 32.1 G/DL (ref 32–36)
MCV RBC AUTO: 76 FL (ref 82–98)
MONOCYTES # BLD AUTO: 0.5 K/UL (ref 0.3–1)
MONOCYTES NFR BLD: 8.7 % (ref 4–15)
NEUTROPHILS # BLD AUTO: 2.8 K/UL (ref 1.8–7.7)
NEUTROPHILS NFR BLD: 49.1 % (ref 38–73)
NRBC BLD-RTO: 0 /100 WBC
PHOSPHATE SERPL-MCNC: 4.2 MG/DL (ref 2.7–4.5)
PLATELET # BLD AUTO: 377 K/UL (ref 150–350)
PMV BLD AUTO: 11.1 FL (ref 9.2–12.9)
POTASSIUM SERPL-SCNC: 3.6 MMOL/L (ref 3.5–5.1)
PROT SERPL-MCNC: 7.5 G/DL (ref 6–8.4)
RBC # BLD AUTO: 5.9 M/UL (ref 4–5.4)
SODIUM SERPL-SCNC: 138 MMOL/L (ref 136–145)
WBC # BLD AUTO: 5.78 K/UL (ref 3.9–12.7)

## 2020-12-16 PROCEDURE — 85025 COMPLETE CBC W/AUTO DIFF WBC: CPT

## 2020-12-16 PROCEDURE — 25000003 PHARM REV CODE 250: Performed by: INTERNAL MEDICINE

## 2020-12-16 PROCEDURE — 85379 FIBRIN DEGRADATION QUANT: CPT

## 2020-12-16 PROCEDURE — 99239 HOSP IP/OBS DSCHRG MGMT >30: CPT | Mod: 95,,, | Performed by: INTERNAL MEDICINE

## 2020-12-16 PROCEDURE — 63600175 PHARM REV CODE 636 W HCPCS: Performed by: PHYSICIAN ASSISTANT

## 2020-12-16 PROCEDURE — 84100 ASSAY OF PHOSPHORUS: CPT

## 2020-12-16 PROCEDURE — 86140 C-REACTIVE PROTEIN: CPT

## 2020-12-16 PROCEDURE — 82728 ASSAY OF FERRITIN: CPT

## 2020-12-16 PROCEDURE — 25000003 PHARM REV CODE 250: Performed by: PHYSICIAN ASSISTANT

## 2020-12-16 PROCEDURE — 80053 COMPREHEN METABOLIC PANEL: CPT

## 2020-12-16 PROCEDURE — 63600175 PHARM REV CODE 636 W HCPCS: Performed by: INTERNAL MEDICINE

## 2020-12-16 PROCEDURE — 36415 COLL VENOUS BLD VENIPUNCTURE: CPT

## 2020-12-16 PROCEDURE — 99239 PR HOSPITAL DISCHARGE DAY,>30 MIN: ICD-10-PCS | Mod: 95,,, | Performed by: INTERNAL MEDICINE

## 2020-12-16 PROCEDURE — 83735 ASSAY OF MAGNESIUM: CPT

## 2020-12-16 RX ORDER — BENZONATATE 100 MG/1
100 CAPSULE ORAL 3 TIMES DAILY PRN
Qty: 30 CAPSULE | Refills: 0 | Status: SHIPPED | OUTPATIENT
Start: 2020-12-16 | End: 2020-12-26

## 2020-12-16 RX ORDER — LIDOCAINE 50 MG/G
1 PATCH TOPICAL DAILY
Qty: 15 PATCH | Refills: 1 | OUTPATIENT
Start: 2020-12-16 | End: 2022-04-23

## 2020-12-16 RX ORDER — METHOCARBAMOL 500 MG/1
500 TABLET, FILM COATED ORAL 3 TIMES DAILY PRN
Qty: 30 TABLET | Refills: 0 | Status: SHIPPED | OUTPATIENT
Start: 2020-12-16 | End: 2020-12-26

## 2020-12-16 RX ORDER — ACETAMINOPHEN 500 MG
500 TABLET ORAL EVERY 6 HOURS PRN
Qty: 40 TABLET | Refills: 1 | Status: SHIPPED | OUTPATIENT
Start: 2020-12-16 | End: 2021-07-19

## 2020-12-16 RX ORDER — IBUPROFEN 600 MG/1
600 TABLET ORAL EVERY 8 HOURS PRN
Qty: 30 TABLET | Refills: 0 | Status: SHIPPED | OUTPATIENT
Start: 2020-12-16 | End: 2022-04-19

## 2020-12-16 RX ORDER — GUAIFENESIN/DEXTROMETHORPHAN 100-10MG/5
5 SYRUP ORAL EVERY 4 HOURS PRN
Refills: 0 | COMMUNITY
Start: 2020-12-16 | End: 2020-12-26

## 2020-12-16 RX ADMIN — OXYCODONE HYDROCHLORIDE AND ACETAMINOPHEN 500 MG: 500 TABLET ORAL at 08:12

## 2020-12-16 RX ADMIN — Medication 1 TABLET: at 08:12

## 2020-12-16 RX ADMIN — MUPIROCIN: 20 OINTMENT TOPICAL at 08:12

## 2020-12-16 RX ADMIN — PANTOPRAZOLE SODIUM 40 MG: 40 TABLET, DELAYED RELEASE ORAL at 08:12

## 2020-12-16 RX ADMIN — KETOROLAC TROMETHAMINE 15 MG: 15 INJECTION, SOLUTION INTRAMUSCULAR; INTRAVENOUS at 11:12

## 2020-12-16 RX ADMIN — CHOLECALCIFEROL (VITAMIN D3) 25 MCG (1,000 UNIT) TABLET 1000 UNITS: TABLET at 08:12

## 2020-12-16 RX ADMIN — CETIRIZINE HYDROCHLORIDE 10 MG: 10 TABLET, FILM COATED ORAL at 08:12

## 2020-12-16 RX ADMIN — DEXAMETHASONE 6 MG: 4 TABLET ORAL at 08:12

## 2020-12-16 NOTE — NURSING
Discharge instructions reviewed with pt. Pt verbalizes understanding of discharge instructions, including holding HCTZ for about a week or until oral intake improves. Pt also educated on robaxin including side effects.    Bedside pharmacy not delivering meds, instructed pt to do curbside pickup. Pt verbalizes understanding. Tele and iv d/c'd. Waiting on ride

## 2020-12-16 NOTE — NURSING
Pt laying in bed, said she did not sleep much last night and wants to rest this morning. Pt c/o mild lower back pain, however, does not want any pain medication at this time.

## 2020-12-16 NOTE — NURSING
No acute events overnight. Pt is AAO, VSS, afebrile, and remained on room air in no respiratory distress. PRN Toradol 15mg given for complaints of back pain, which provided mild relief. Independent and ambulatory. Bed is locked, in lowest position, side rails are raised x2, and call bell is within reach. Will continue to monitor.

## 2020-12-16 NOTE — NURSING
"Dr. Ta was informed that pt has not received robaxin since yesterday morning. Pt's pain seems to be better today, only has need toradol around noon for back pain today.    Looking over pt's discharge instructions. Noted prescription for robaxin PRN and resuming HCTZ. Clarified meds with Dr. Ta since medication was held yesterday for low BP. Dr. Ta stated she discussed with Pt about holding HCTZ until  her intake improved, "I think she could probable hold it for a week." Will remind pt when going over instructions and write in pt's instructions so she will remember.   "

## 2020-12-16 NOTE — DISCHARGE SUMMARY
[facility] 15 [facility] Medicine  Telemedicine Discharge Summary      Patient Name: Aniket Ac  MRN: [id number]  Admission Date: 12/10/2020  Hospital Length of Stay: 3 days  Discharge Date and Time:  12/16/2020 3:01 PM  Attending Physician: Shirley Ta MD   Discharging Provider: Shirley Ta MD  Primary Care Provider: LINDA García    [facility] Medicine Team: [facility] VIRTUAL TEAM 10 Shirley Ta MD  Patient was transferred to the telemedicine service on: 12/12/2020  This document was prepared by chart review and may not directly reflect my personal knowledge of the patient's case, clinical course, or significant events during the hospital stay.      HPI:  35 year old F with HTN presented with worsening SOB and cough.  She was diagnosed with COVID on 12/3/2020 after experiencing cough and HA in the setting of her SO's positive COVID test.  On initial diagnosis, she received steroids and was discharged with an inhaler.  She states she initially felt better, but over the past few days symptoms have worsened.  She states the albuterol inhaler has not helped, but made her have palpitations.  She complains of pleuritic chest pain, back pain, N/V/D, fatigue, fever/chills, decreased appetite, decreased sense of smell and taste.  She is tearful on exam. Patient admitted for evaluation of acute respiratory failure with hypoxia secondary to COVID-19.        * No surgery found *      Hospital Course:    Aniket Ac was admitted to [facility] Medicine for treatment of suspected COVID-19 viral infection and was treated with supportive care following a comprehensive physical, radiographic, and lab evaluation tailored to the current standard of care for COVID19 at that time. Please review the admission H&P and the studies listed below for details. She improved with supportive care and was found to be suitable for discharge home without oxygen.    On the day of discharge, isolation  precautions were reviewed at length verbally. The patient was also provided with written isolation guidelines modified from the [place] Department of Health and [facility] as well as the CDC, as part of discharge paperwork. An updated phone number was obtained, and positive patients will be enrolled in the COVID-19 Home Symptom Monitoring program.    Additional details of the hospitalization include:  Patient started on dexamethasone, remdesivir, azithromycin, ceftriaxone, breathing treatments.  Patient reports she suffered a fall while walking to the bathroom in the ED, and since then has had acute on chronic worsening back pain.     COVID-19 Virus Infection:  Viral Pneumonia due to COVID-19:  -Pt tested for COVID-19 and noted to be positive on 12/3  -Isolation: Airborne/Droplet. Surgical mask on patient. Notify Infection Control  -Management: per [facility] COVID Treatment Protocol (4/15/20)  -Monitoring: Telemetry & Continuous Pulse Oximetry  -Antibiotics: started on ceftriaxone 1g Q24h x 5 days and azithromycin 500mg po x1, then 250mg po daily x 4 days  -Nutrition:               -Multivitamin PO daily              -Add Boost supplement              -Vitamin D 1000IU daily if deficient              -Ascorbic acid 500mg PO bid  -Supportive Care:              -Acetaminophen 650mg PO Q6hr PRN fever/headache              -Loperamide PRN viral diarrhea              -Robitussin, tessalon perls for cough              -IVF if indicated, restrictive strategy preferred, no maintenance IV if able   -Investigational Therapies:              -Due to hypoxia, pt started on dexamethasone 6mg PO daily up to 10 days or until pt is discharged from hospital (whichever is sooner)              -Pt meets criteria for remdesivir - started. Improving              -persistent chest pain due to COVID19; EKG nonischemic.     Acute Hypoxemic Respiratory Failure: resolved  -RT consult via Respiratory Communication for COVID Protocols  -If  wheezing, albuterol INH Q6h scheduled & PRN  -Incentive Spirometer Q4h  -Flutter Valve Q4h  -Continuous pulse oximetry; titrate oxygen to keep sats between 92-96%  -Wean off O2 as tolerated    -Supplemental O2 via LFNC, VentiMask, or HFNC (see Respiratory Support Oxygen Therapies)  -Proning Protocol if patient is a candidate with GCS >13 and able to self-prone (see HM Proning Protocol)  -If deterioration, may warrant trial of NIPPV and transfer to [facility] pressure room or immediate ICU consult     Hypertension:  -stable  -Continue hydrochlorothiazide 12.5 mg p.o. daily  -monitor vitals q4h  -SBP goal of <160 in hospital  - hypotensive and volume depleted 12/15; stopped HCTZ. Trial of NS bolus. BP improved.     Fall/back pain:  -reports she suffered a mechanical fall while walking to the bathroom in the ED and broke the fall with her hip  -order x-ray pelvis/hip  -consider MRI if symptoms persist  -Complains of lower back pain, Her numbness and tingling has now resolved.   -pain control with lidocaine patch, Tylenol, Toradol p.r.n.  -Improved with Robaxin.   -follow up with PCP      Consults:   Consults (From admission, onward)        Status Ordering Provider     Inpatient consult to Midline team  Once     Provider:  (Not yet assigned)    Completed LARON KENYON     Inpatient virtual consult to Hospital Medicine  Once     Provider:  (Not yet assigned)    Completed LARON KENYON     Pharmacy Remdesivir Consult  Once     Provider:  (Not yet assigned)    Acknowledged LARON KENYON          Final Active Diagnoses:    Diagnosis Date Noted POA    PRINCIPAL PROBLEM:  COVID-19 virus infection [U07.1] 12/11/2020 Yes    Back pain [M54.9] 12/12/2020 Yes    Essential hypertension [I10] 12/11/2020 Yes     Chronic      Problems Resolved During this Admission:    Diagnosis Date Noted Date Resolved POA    Acute respiratory failure with hypoxia [J96.01] 12/13/2020 12/16/2020 Yes      Discharged Condition:  stable    Disposition: Home or Self Care    Follow Up:  Follow-up Information     LINDA García On 12/29/2020.    Specialty: Family Medicine  Why: hospital follow-up TELEMED  @ 10:20am  ( WILL BE A PHONE VISIT)  Contact information:  [address] [facility] [id number] [phone]                   Patient Instructions:      Diet Adult Regular     Notify your health care provider if you experience any of the following:  temperature >100.4     Notify your health care provider if you experience any of the following:  persistent nausea and vomiting or diarrhea     Notify your health care provider if you experience any of the following:  difficulty breathing or increased cough     Notify your health care provider if you experience any of the following:  severe persistent headache     Notify your health care provider if you experience any of the following:  increased confusion or weakness     Notify your health care provider if you experience any of the following:  persistent dizziness, light-headedness, or visual disturbances     COVID-19 Home Symptom Monitoring  - Duration (days): 14     Order Specific Question Answer Comments   Duration (days) 14      Activity as tolerated     Medications:  Reconciled Home Medications:      Medication List      START taking these medications    acetaminophen 500 MG tablet  Commonly known as: TYLENOL  Take 1 tablet (500 mg total) by mouth every 6 (six) hours as needed for Pain.     dextromethorphan-guaifenesin  mg/5 ml  mg/5 mL liquid  Commonly known as: ROBITUSSIN-DM  Take 5 mLs by mouth every 4 (four) hours as needed.     ibuprofen 600 MG tablet  Commonly known as: ADVIL,MOTRIN  Take 1 tablet (600 mg total) by mouth every 8 (eight) hours as needed for Pain.     methocarbamoL 500 MG Tab  Commonly known as: ROBAXIN  Take 1 tablet (500 mg total) by mouth 3 (three) times daily as needed (Back pain).     multivitamin Tab  Take 1 tablet by mouth once daily.  Start  taking on: December 17, 2020        CONTINUE taking these medications    albuterol 90 mcg/actuation inhaler  Commonly known as: PROVENTIL/VENTOLIN HFA  Inhale 1-2 puffs into the lungs every 6 (six) hours as needed for Wheezing. Rescue     benzonatate 100 MG capsule  Commonly known as: TESSALON  Take 1 capsule (100 mg total) by mouth 3 (three) times daily as needed.     cetirizine 10 MG tablet  Commonly known as: ZYRTEC  Take 1 tablet (10 mg total) by mouth once daily.     hydroCHLOROthiazide 25 MG tablet  Commonly known as: HYDRODIURIL  Take 0.5 tablets (12.5 mg total) by mouth once daily.     itraconazole 100 mg Cap  Commonly known as: SPORANOX  Take 200 mg by mouth once daily.     lactobacillus acidophilus & bulgar 100 million cell packet  Commonly known as: LACTINEX  Take 1 tablet by mouth 2 (two) times daily.     lidocaine 5 %  Commonly known as: LIDODERM  Place 1 patch onto the skin once daily. Remove & Discard patch within 12 hours or as directed by MD     TURMERIC ROOT EXTRACT ORAL  Take 500 mg by mouth.     VITAMIN C 1000 MG tablet  Generic drug: ascorbic acid (vitamin C)  Take 1,000 mg by mouth once daily.     vitamin D 1000 units Tab  Commonly known as: VITAMIN D3  Take 1,000 Units by mouth once daily.        STOP taking these medications    TYLENOL-CODEINE #3 ORAL     vitamin A 53711 UNIT capsule            Significant Diagnostic Studies:     Recent Labs   Lab 12/13/20 0035 12/14/20 0422 12/16/20  0351   WBC 5.72 4.62 5.78   HGB 14.0 13.4 14.3   HCT 42.8 41.7 44.6    335 377*     Recent Labs   Lab 12/13/20 0035 12/14/20 0422 12/16/20  0351   GRAN 73.4*  4.2 55.1  2.5 49.1  2.8   LYMPH 21.5  1.2 37.4  1.7 39.1  2.3   MONO 4.5  0.3 6.9  0.3 8.7  0.5   EOS 0.0 0.0 0.0     Recent Labs   Lab 12/14/20  0422 12/15/20  0509 12/16/20  0351    137 138   K 3.5 3.4* 3.6   CL 99 99 99   CO2 28 24 24   BUN 24* 20 21*   CREATININE 1.0 0.9 0.9   GLU 87 95 66*   CALCIUM 8.8 9.0 9.4   ALBUMIN 3.0*  3.0* 3.2*   MG 2.9* 2.6 2.5   PHOS 5.0* 4.4 4.2     Recent Labs   Lab 12/11/20  0902  12/14/20  0422 12/15/20  0509 12/16/20  0351   ALKPHOS  --    < > 49* 48* 56   ALT  --    < > 29 27 28   AST  --    < > 28 24 20   PROT  --    < > 7.5 7.5 7.5   BILITOT  --    < > 0.4 0.5 0.6   INR 0.9  --   --   --   --     < > = values in this interval not displayed.     Procalcitonin (ng/mL)   Date Value   12/11/2020 0.22     Lactate (Lactic Acid) (mmol/L)   Date Value   12/11/2020 0.7   12/11/2020 2.7 (H)     BNP (pg/mL)   Date Value   12/11/2020 <10   09/09/2020 20   03/26/2020 <10     CRP (mg/L)   Date Value   12/16/2020 14.1 (H)   12/14/2020 36.1 (H)   12/13/2020 68.0 (H)   12/11/2020 126.2 (H)     Sed Rate (mm/Hr)   Date Value   12/11/2020 89 (H)     D-Dimer (mg/L FEU)   Date Value   12/16/2020 0.58 (H)   12/14/2020 0.65 (H)   12/11/2020 0.64 (H)   03/26/2020 0.31     Ferritin (ng/mL)   Date Value   12/16/2020 587 (H)   12/14/2020 647 (H)   12/11/2020 1,900 (H)     LD (U/L)   Date Value   12/11/2020 508 (H)     Troponin I (ng/mL)   Date Value   12/11/2020 0.009   09/09/2020 <0.006   03/26/2020 <0.006     CPK (U/L)   Date Value   12/11/2020 157     Results for orders placed or performed during the hospital encounter of 12/10/20   Vitamin D   Result Value Ref Range    Vit D, 25-Hydroxy 29 (L) 30 - 96 ng/mL     SARS-CoV2 (COVID-19) Qualitative PCR (no units)   Date Value   03/25/2020 Not Detected     POC Rapid COVID (no units)   Date Value   12/03/2020 Positive (A)     ECG Results          EKG 12-lead (Final result)  Result time 12/11/20 15:51:01    Final result by Interface, Lab In [facility] (12/11/20 15:51:01)                 Narrative:    Test Reason : R07.9,    Vent. Rate : 103 BPM     Atrial Rate : 103 BPM     P-R Int : 166 ms          QRS Dur : 090 ms      QT Int : 336 ms       P-R-T Axes : 075 084 047 degrees     QTc Int : 440 ms    Sinus tachycardia  Otherwise normal ECG  When compared with ECG of 10-DEC-2020 21:53,  No  significant change was found  Confirmed by SARAH MUÑOZ MD ([id number]) on 12/11/2020 3:50:50 PM    Referred By: JAYCE   SELF           Confirmed By:SARAH MUÑOZ MD                             EKG 12-lead (Final result)  Result time 12/11/20 15:47:54    Final result by Interface, Lab In [facility] (12/11/20 15:47:54)                 Narrative:    Test Reason : U07.1,J06.9,    Vent. Rate : 110 BPM     Atrial Rate : 110 BPM     P-R Int : 156 ms          QRS Dur : 084 ms      QT Int : 310 ms       P-R-T Axes : 065 092 021 degrees     QTc Int : 419 ms    Sinus tachycardia  Right atrial enlargement  Rightward axis  Borderline Abnormal ECG  When compared with ECG of 09-SEP-2020 20:18,  Nonspecific T wave abnormality no longer evident in Lateral leads  Confirmed by SARAH MUÑOZ MD ([id number]) on 12/11/2020 3:47:45 PM    Referred By: JAYCE   SELF           Confirmed By:SARAH MUÑOZ MD                            X-Ray Hips Bilateral 2 View Inc AP Pelvis  Narrative: EXAMINATION:  XR HIPS BILATERAL 2 VIEW INCL AP PELVIS    CLINICAL HISTORY:  fall, lower back pain;    TECHNIQUE:  AP view of the pelvis and frogleg lateral views of both hips were performed.    COMPARISON:  None.    FINDINGS:  No acute fractures.  No malalignment.  Preserved right and left SI joints and hip joint spaces.  Preserved right and left femoral head contours.  Impression: No fracture.    Electronically signed by: Gurmeet Lozano  Date:    12/11/2020  Time:    11:46  X-Ray Chest AP Portable  Narrative: EXAMINATION:  AP PORTABLE CHEST    CLINICAL HISTORY:  covid;    TECHNIQUE:  AP portable chest radiograph was submitted.    COMPARISON:  12/03/2020    FINDINGS:  AP portable chest radiograph demonstrates mild enlargement of the cardiac silhouette.  There is subtle asymmetric density in the left retrocardiac region.  Findings may represent atelectasis or infiltrate.  There is no pneumothorax or pleural effusion.  Cholecystectomy clips are present.  Impression: As  above described.    Electronically signed by: Sahara Lee  Date:    12/11/2020  Time:    00:41        Pending Diagnostic Studies:     None        Indwelling Lines/Drains at time of discharge:  none    Time spent on the discharge of patient: 45 minutes  Patient was seen and examined on the date of discharge and determined to be suitable for discharge.  Time was spent speaking with consultants and case management, reviewing records, and/or discussing the plan of care with patient/family.  Chief complaint: COVID-19 virus infection  The patient location is: [facility]/[id number] [address]  The patient arrived at: 12/10/2020  9:47 PM  Start time: 1424  End time:  1440  Total time spent with patient: 16 min  Present with the patient at the time of the telemed/virtual assessment: n/a  I have assessed findings virtually using a telemedicine platform and with assistance of the bedside nurse or telemedicine presenter.  The attending portion of this evaluation, treatment, and documentation was performed per Shirley Ta MD via audiovisual.         Shirley Ta MD  Department of Hospital Medicine  [facility]

## 2020-12-17 ENCOUNTER — PATIENT OUTREACH (OUTPATIENT)
Dept: ADMINISTRATIVE | Facility: CLINIC | Age: 35
End: 2020-12-17

## 2020-12-17 NOTE — PLAN OF CARE
Patient medically ready for discharge to HOME NO NEEDS. Any necessary transport setup by . This CM scheduled necessary follow-up appointments. Family/patient aware of discharge.    HOSPITAL FOLLOW- UP 12/29/2020 @ 10:20am      Chrissie Michaels RN  Case Management  Ext [phone]        12/17/20 1555   Final Note   Assessment Type Final Discharge Note   Anticipated Discharge Disposition Home   What phone number can be called within the next 1-3 days to see how you are doing after discharge? [phone]   Hospital Follow Up  Appt(s) scheduled? Yes  (12/29/2020 @ 10:20am)   Discharge plans and expectations educations in teach back method with documentation complete? Yes  (per bedside nurse)   Right Care Referral Info   Post Acute Recommendation No Care   Post-Acute Status   Discharge Delays None known at this time

## 2020-12-17 NOTE — TELEPHONE ENCOUNTER
C3 nurse attempted to contact patient. No answer. The following message was left for the patient to return the call:   Good afternoon  I am a nurse calling on behalf of [facility] from the Care Coordination [facility].  This is a Transitional Care Call for Kaitlynalbertamarcia URIAS Ac. When you have a moment please contact us at [phone] or [phone] Monday through Friday, between the hours of 8 am to 4 pm. We look forward to speaking with you. On behalf of [facility] have a nice day.    The patient has a scheduled HOSFU appointment with LINDA García on 12/29/2020 @ 1020. PCP not within [facility].    12/18/2020 @ 0819   Left voicemail

## 2020-12-18 ENCOUNTER — NURSE TRIAGE (OUTPATIENT)
Dept: ADMINISTRATIVE | Facility: CLINIC | Age: 35
End: 2020-12-18

## 2020-12-18 NOTE — TELEPHONE ENCOUNTER
Patient contacted for PDC call.  Patient complains of SOB with activity, states that she is having hallucinations at night, and is afraid to go to sleep. Disposition got to ED now.  Patient states that she will go to [facility] ED.    Reason for Disposition   MILD difficulty breathing (e.g., minimal/no SOB at rest, SOB with walking, pulse <100)    Additional Information   Negative: Severe difficulty breathing (e.g., struggling for each breath, speaks in single words)   Negative: Difficult to awaken or acting confused (e.g., disoriented, slurred speech)   Negative: Bluish (or gray) lips or face now   Negative: Shock suspected (e.g., cold/pale/clammy skin, too weak to stand, low BP, rapid pulse)   Negative: Sounds like a life-threatening emergency to the triager   Negative: SEVERE or constant chest pain or pressure (Exception: mild central chest pain, present only when coughing)   Negative: MODERATE difficulty breathing (e.g., speaks in phrases, SOB even at rest, pulse 100-120)    Protocols used: CORONAVIRUS (COVID-19) DIAGNOSED OR [id number]

## 2021-07-19 ENCOUNTER — OFFICE VISIT (OUTPATIENT)
Dept: SLEEP MEDICINE | Facility: CLINIC | Age: 36
End: 2021-07-19
Payer: MEDICAID

## 2021-07-19 VITALS
HEART RATE: 86 BPM | SYSTOLIC BLOOD PRESSURE: 121 MMHG | DIASTOLIC BLOOD PRESSURE: 86 MMHG | BODY MASS INDEX: 37.98 KG/M2 | HEIGHT: 69 IN

## 2021-07-19 DIAGNOSIS — R06.83 SNORING: Primary | ICD-10-CM

## 2021-07-19 DIAGNOSIS — G47.33 OSA (OBSTRUCTIVE SLEEP APNEA): ICD-10-CM

## 2021-07-19 DIAGNOSIS — G47.10 PERSISTENT DISORDER OF INITIATING OR MAINTAINING WAKEFULNESS: ICD-10-CM

## 2021-07-19 PROCEDURE — 99204 PR OFFICE/OUTPT VISIT, NEW, LEVL IV, 45-59 MIN: ICD-10-PCS | Mod: S$PBB,,, | Performed by: INTERNAL MEDICINE

## 2021-07-19 PROCEDURE — 99204 OFFICE O/P NEW MOD 45 MIN: CPT | Mod: S$PBB,,, | Performed by: INTERNAL MEDICINE

## 2021-07-19 PROCEDURE — 99999 PR PBB SHADOW E&M-EST. PATIENT-LVL II: CPT | Mod: PBBFAC,,, | Performed by: INTERNAL MEDICINE

## 2021-07-19 PROCEDURE — 99212 OFFICE O/P EST SF 10 MIN: CPT | Mod: PBBFAC | Performed by: INTERNAL MEDICINE

## 2021-07-19 PROCEDURE — 99999 PR PBB SHADOW E&M-EST. PATIENT-LVL II: ICD-10-PCS | Mod: PBBFAC,,, | Performed by: INTERNAL MEDICINE

## 2021-07-19 RX ORDER — DICLOFENAC SODIUM 50 MG/1
50 TABLET, DELAYED RELEASE ORAL 2 TIMES DAILY PRN
COMMUNITY
Start: 2021-05-13 | End: 2022-04-19

## 2021-07-19 RX ORDER — GABAPENTIN 100 MG/1
100 CAPSULE ORAL 2 TIMES DAILY
COMMUNITY
Start: 2021-06-24 | End: 2022-04-19

## 2021-07-20 ENCOUNTER — TELEPHONE (OUTPATIENT)
Dept: SLEEP MEDICINE | Facility: OTHER | Age: 36
End: 2021-07-20

## 2021-07-22 ENCOUNTER — HOSPITAL ENCOUNTER (OUTPATIENT)
Dept: SLEEP MEDICINE | Facility: OTHER | Age: 36
Discharge: HOME | End: 2021-07-22
Attending: INTERNAL MEDICINE
Payer: MEDICAID

## 2021-07-22 DIAGNOSIS — R06.83 SNORING: ICD-10-CM

## 2021-07-22 DIAGNOSIS — G47.10 PERSISTENT DISORDER OF INITIATING OR MAINTAINING WAKEFULNESS: ICD-10-CM

## 2021-07-22 DIAGNOSIS — G47.33 OSA (OBSTRUCTIVE SLEEP APNEA): ICD-10-CM

## 2021-07-22 PROCEDURE — 95800 PR SLEEP STUDY, UNATTENDED, RECORD HEART RATE/O2 SAT/RESP ANAL/SLEEP TIME: ICD-10-PCS | Mod: 26,,, | Performed by: INTERNAL MEDICINE

## 2021-07-22 PROCEDURE — 95800 SLP STDY UNATTENDED: CPT | Mod: 26,,, | Performed by: INTERNAL MEDICINE

## 2021-07-22 PROCEDURE — 95800 SLP STDY UNATTENDED: CPT

## 2021-07-25 DIAGNOSIS — G47.10 PERSISTENT DISORDER OF INITIATING OR MAINTAINING WAKEFULNESS: Primary | ICD-10-CM

## 2021-07-25 DIAGNOSIS — R06.83 SNORING: ICD-10-CM

## 2021-07-25 DIAGNOSIS — R06.81 WITNESSED EPISODE OF APNEA: ICD-10-CM

## 2021-07-26 ENCOUNTER — TELEPHONE (OUTPATIENT)
Dept: SLEEP MEDICINE | Facility: OTHER | Age: 36
End: 2021-07-26

## 2021-07-26 ENCOUNTER — HOSPITAL ENCOUNTER (OUTPATIENT)
Dept: SLEEP MEDICINE | Facility: OTHER | Age: 36
Discharge: HOME | End: 2021-07-26
Attending: INTERNAL MEDICINE
Payer: MEDICAID

## 2021-07-26 DIAGNOSIS — G47.10 PERSISTENT DISORDER OF INITIATING OR MAINTAINING WAKEFULNESS: ICD-10-CM

## 2021-07-26 DIAGNOSIS — R06.83 SNORING: ICD-10-CM

## 2021-07-26 DIAGNOSIS — R06.81 WITNESSED EPISODE OF APNEA: ICD-10-CM

## 2021-07-26 PROCEDURE — 95810 POLYSOM 6/> YRS 4/> PARAM: CPT | Mod: 26,,, | Performed by: INTERNAL MEDICINE

## 2021-07-26 PROCEDURE — 95810 POLYSOM 6/> YRS 4/> PARAM: CPT

## 2021-07-26 PROCEDURE — 95810 PR POLYSOMNOGRAPHY, 4 OR MORE: ICD-10-PCS | Mod: 26,,, | Performed by: INTERNAL MEDICINE

## 2021-08-10 ENCOUNTER — TELEPHONE (OUTPATIENT)
Dept: SLEEP MEDICINE | Facility: CLINIC | Age: 36
End: 2021-08-10

## 2021-08-11 ENCOUNTER — TELEPHONE (OUTPATIENT)
Dept: SLEEP MEDICINE | Facility: CLINIC | Age: 36
End: 2021-08-11

## 2022-04-07 ENCOUNTER — HOSPITAL ENCOUNTER (EMERGENCY)
Facility: HOSPITAL | Age: 37
Discharge: HOME | End: 2022-04-07
Attending: EMERGENCY MEDICINE
Payer: MEDICAID

## 2022-04-07 VITALS
OXYGEN SATURATION: 100 % | TEMPERATURE: 98 F | SYSTOLIC BLOOD PRESSURE: 136 MMHG | BODY MASS INDEX: 37.33 KG/M2 | DIASTOLIC BLOOD PRESSURE: 78 MMHG | RESPIRATION RATE: 16 BRPM | HEIGHT: 69 IN | HEART RATE: 89 BPM | WEIGHT: 252 LBS

## 2022-04-07 DIAGNOSIS — R10.30 LOWER ABDOMINAL PAIN: ICD-10-CM

## 2022-04-07 DIAGNOSIS — K52.9 COLITIS: Primary | ICD-10-CM

## 2022-04-07 LAB
ALBUMIN SERPL BCP-MCNC: 3.7 G/DL (ref 3.5–5.2)
ALP SERPL-CCNC: 61 U/L (ref 55–135)
ALT SERPL W/O P-5'-P-CCNC: 12 U/L (ref 10–44)
ANION GAP SERPL CALC-SCNC: 9 MMOL/L (ref 8–16)
AST SERPL-CCNC: 13 U/L (ref 10–40)
B-HCG UR QL: NEGATIVE
BACTERIA #/AREA URNS AUTO: NORMAL /HPF
BASOPHILS # BLD AUTO: 0.04 K/UL (ref 0–0.2)
BASOPHILS NFR BLD: 1 % (ref 0–1.9)
BILIRUB SERPL-MCNC: 0.8 MG/DL (ref 0.1–1)
BILIRUB UR QL STRIP: NEGATIVE
BUN SERPL-MCNC: 16 MG/DL (ref 6–20)
CALCIUM SERPL-MCNC: 9.5 MG/DL (ref 8.7–10.5)
CHLORIDE SERPL-SCNC: 108 MMOL/L (ref 95–110)
CLARITY UR REFRACT.AUTO: CLEAR
CO2 SERPL-SCNC: 24 MMOL/L (ref 23–29)
COLOR UR AUTO: YELLOW
CREAT SERPL-MCNC: 0.8 MG/DL (ref 0.5–1.4)
CTP QC/QA: YES
DIFFERENTIAL METHOD: ABNORMAL
EOSINOPHIL # BLD AUTO: 0.1 K/UL (ref 0–0.5)
EOSINOPHIL NFR BLD: 2.2 % (ref 0–8)
ERYTHROCYTE [DISTWIDTH] IN BLOOD BY AUTOMATED COUNT: 13 % (ref 11.5–14.5)
EST. GFR  (AFRICAN AMERICAN): >60 ML/MIN/1.73 M^2
EST. GFR  (NON AFRICAN AMERICAN): >60 ML/MIN/1.73 M^2
GLUCOSE SERPL-MCNC: 81 MG/DL (ref 70–110)
GLUCOSE UR QL STRIP: NEGATIVE
HCT VFR BLD AUTO: 38.2 % (ref 37–48.5)
HGB BLD-MCNC: 12.6 G/DL (ref 12–16)
HGB UR QL STRIP: NEGATIVE
IMM GRANULOCYTES # BLD AUTO: 0.01 K/UL (ref 0–0.04)
IMM GRANULOCYTES NFR BLD AUTO: 0.2 % (ref 0–0.5)
KETONES UR QL STRIP: NEGATIVE
LEUKOCYTE ESTERASE UR QL STRIP: NEGATIVE
LIPASE SERPL-CCNC: 16 U/L (ref 4–60)
LYMPHOCYTES # BLD AUTO: 2.2 K/UL (ref 1–4.8)
LYMPHOCYTES NFR BLD: 53.3 % (ref 18–48)
MCH RBC QN AUTO: 24.7 PG (ref 27–31)
MCHC RBC AUTO-ENTMCNC: 33 G/DL (ref 32–36)
MCV RBC AUTO: 75 FL (ref 82–98)
MICROSCOPIC COMMENT: NORMAL
MONOCYTES # BLD AUTO: 0.3 K/UL (ref 0.3–1)
MONOCYTES NFR BLD: 7.4 % (ref 4–15)
NEUTROPHILS # BLD AUTO: 1.4 K/UL (ref 1.8–7.7)
NEUTROPHILS NFR BLD: 35.9 % (ref 38–73)
NITRITE UR QL STRIP: NEGATIVE
NRBC BLD-RTO: 0 /100 WBC
PH UR STRIP: 6 [PH] (ref 5–8)
PLATELET # BLD AUTO: 239 K/UL (ref 150–450)
PMV BLD AUTO: 9.8 FL (ref 9.2–12.9)
POTASSIUM SERPL-SCNC: 3.7 MMOL/L (ref 3.5–5.1)
PROT SERPL-MCNC: 7 G/DL (ref 6–8.4)
PROT UR QL STRIP: NEGATIVE
RBC # BLD AUTO: 5.11 M/UL (ref 4–5.4)
RBC #/AREA URNS AUTO: 3 /HPF (ref 0–4)
SODIUM SERPL-SCNC: 141 MMOL/L (ref 136–145)
SP GR UR STRIP: 1.02 (ref 1–1.03)
SQUAMOUS #/AREA URNS AUTO: 2 /HPF
URN SPEC COLLECT METH UR: NORMAL
WBC # BLD AUTO: 4.03 K/UL (ref 3.9–12.7)
WBC #/AREA URNS AUTO: 1 /HPF (ref 0–5)

## 2022-04-07 PROCEDURE — 25000003 PHARM REV CODE 250: Performed by: PHYSICIAN ASSISTANT

## 2022-04-07 PROCEDURE — 99284 EMERGENCY DEPT VISIT MOD MDM: CPT | Mod: ,,, | Performed by: PHYSICIAN ASSISTANT

## 2022-04-07 PROCEDURE — 63600175 PHARM REV CODE 636 W HCPCS: Performed by: PHYSICIAN ASSISTANT

## 2022-04-07 PROCEDURE — 85025 COMPLETE CBC W/AUTO DIFF WBC: CPT | Performed by: PHYSICIAN ASSISTANT

## 2022-04-07 PROCEDURE — 96374 THER/PROPH/DIAG INJ IV PUSH: CPT | Mod: 59

## 2022-04-07 PROCEDURE — 80053 COMPREHEN METABOLIC PANEL: CPT | Performed by: PHYSICIAN ASSISTANT

## 2022-04-07 PROCEDURE — 96375 TX/PRO/DX INJ NEW DRUG ADDON: CPT

## 2022-04-07 PROCEDURE — 99284 PR EMERGENCY DEPT VISIT,LEVEL IV: ICD-10-PCS | Mod: ,,, | Performed by: PHYSICIAN ASSISTANT

## 2022-04-07 PROCEDURE — 25500020 PHARM REV CODE 255: Performed by: EMERGENCY MEDICINE

## 2022-04-07 PROCEDURE — 99285 EMERGENCY DEPT VISIT HI MDM: CPT | Mod: 25

## 2022-04-07 PROCEDURE — 81001 URINALYSIS AUTO W/SCOPE: CPT | Performed by: PHYSICIAN ASSISTANT

## 2022-04-07 PROCEDURE — 96361 HYDRATE IV INFUSION ADD-ON: CPT

## 2022-04-07 PROCEDURE — 83690 ASSAY OF LIPASE: CPT | Performed by: PHYSICIAN ASSISTANT

## 2022-04-07 PROCEDURE — 81025 URINE PREGNANCY TEST: CPT | Performed by: PHYSICIAN ASSISTANT

## 2022-04-07 RX ORDER — NAPROXEN 500 MG/1
500 TABLET ORAL 2 TIMES DAILY PRN
Qty: 10 TABLET | Refills: 0 | Status: SHIPPED | OUTPATIENT
Start: 2022-04-07 | End: 2023-03-20 | Stop reason: SDUPTHER

## 2022-04-07 RX ORDER — METRONIDAZOLE 500 MG/1
500 TABLET ORAL 3 TIMES DAILY
Qty: 15 TABLET | Refills: 0 | Status: SHIPPED | OUTPATIENT
Start: 2022-04-07 | End: 2022-04-12

## 2022-04-07 RX ORDER — KETOROLAC TROMETHAMINE 30 MG/ML
10 INJECTION, SOLUTION INTRAMUSCULAR; INTRAVENOUS
Status: COMPLETED | OUTPATIENT
Start: 2022-04-07 | End: 2022-04-07

## 2022-04-07 RX ORDER — ONDANSETRON 2 MG/ML
4 INJECTION INTRAMUSCULAR; INTRAVENOUS
Status: COMPLETED | OUTPATIENT
Start: 2022-04-07 | End: 2022-04-07

## 2022-04-07 RX ORDER — CIPROFLOXACIN 500 MG/1
500 TABLET ORAL 2 TIMES DAILY
Qty: 10 TABLET | Refills: 0 | Status: SHIPPED | OUTPATIENT
Start: 2022-04-07 | End: 2022-04-12

## 2022-04-07 RX ADMIN — ONDANSETRON 4 MG: 2 INJECTION INTRAMUSCULAR; INTRAVENOUS at 12:04

## 2022-04-07 RX ADMIN — IOHEXOL 100 ML: 350 INJECTION, SOLUTION INTRAVENOUS at 01:04

## 2022-04-07 RX ADMIN — KETOROLAC TROMETHAMINE 10 MG: 30 INJECTION, SOLUTION INTRAMUSCULAR at 12:04

## 2022-04-07 RX ADMIN — SODIUM CHLORIDE 1000 ML: 0.9 INJECTION, SOLUTION INTRAVENOUS at 12:04

## 2022-04-07 NOTE — ED PROVIDER NOTES
Encounter Date: 2022       History     Chief Complaint   Patient presents with    Abdominal Pain     Pt c/o LLQ pain x 2 days.       37-year-old female with past medical history of hypertension presents to the emergency department with chief complaint of lower abdominal pain that began 3 days ago.  She reports that the pain spans across her  section scar and has been constant since onset.  Describes pain as crampy.  She additionally complains of a burning pain to the left side of her abdomen and left flank that began today.  She endorses nausea but denies vomiting or diarrhea.  Last bowel movement was yesterday.  She denies dysuria, hematuria, urinary frequency.  She denies pain of this nature in the past.  She denies any other worsening or alleviating factors.        Review of patient's allergies indicates:   Allergen Reactions    Penicillins      Past Medical History:   Diagnosis Date    Essential hypertension 2020     Past Surgical History:   Procedure Laterality Date     SECTION, CLASSIC      CHOLECYSTECTOMY      TUBAL LIGATION       History reviewed. No pertinent family history.  Social History     Tobacco Use    Smoking status: Never Smoker    Smokeless tobacco: Never Used   Substance Use Topics    Alcohol use: Yes    Drug use: No     Review of Systems   Constitutional: Negative for fever.   HENT: Negative for sore throat.    Respiratory: Negative for shortness of breath.    Cardiovascular: Negative for chest pain.   Gastrointestinal: Positive for abdominal pain and nausea.   Genitourinary: Negative for dysuria.   Musculoskeletal: Negative for back pain.   Skin: Negative for rash.   Neurological: Negative for weakness.   Hematological: Does not bruise/bleed easily.       Physical Exam     Initial Vitals [22 1050]   BP Pulse Resp Temp SpO2   134/78 76 16 98.2 °F (36.8 °C) 100 %      MAP       --         Physical Exam    Nursing note and vitals reviewed.  Constitutional:  She appears well-developed and well-nourished. She is not diaphoretic. No distress.   HENT:   Head: Normocephalic and atraumatic.   Mouth/Throat: Oropharynx is clear and moist.   Eyes: Conjunctivae and EOM are normal. Pupils are equal, round, and reactive to light.   Neck: Neck supple.   Normal range of motion.  Cardiovascular: Normal rate, regular rhythm, normal heart sounds and intact distal pulses. Exam reveals no gallop and no friction rub.    No murmur heard.  Pulmonary/Chest: Breath sounds normal. She has no wheezes. She has no rhonchi. She has no rales.   Abdominal: Abdomen is soft. Bowel sounds are normal. There is abdominal tenderness in the right lower quadrant and left lower quadrant. There is no rebound and no guarding.   Musculoskeletal:         General: Normal range of motion.      Cervical back: Normal range of motion and neck supple.     Neurological: She is alert and oriented to person, place, and time. She has normal strength. No cranial nerve deficit or sensory deficit. GCS score is 15. GCS eye subscore is 4. GCS verbal subscore is 5. GCS motor subscore is 6.   Skin: Skin is warm and dry. Capillary refill takes less than 2 seconds.   Psychiatric: She has a normal mood and affect. Her behavior is normal. Judgment and thought content normal.         ED Course   Procedures  Labs Reviewed   CBC W/ AUTO DIFFERENTIAL - Abnormal; Notable for the following components:       Result Value    MCV 75 (*)     MCH 24.7 (*)     Gran # (ANC) 1.4 (*)     Gran % 35.9 (*)     Lymph % 53.3 (*)     All other components within normal limits   COMPREHENSIVE METABOLIC PANEL   LIPASE   URINALYSIS, REFLEX TO URINE CULTURE    Narrative:     Specimen Source->Urine   URINALYSIS MICROSCOPIC    Narrative:     Specimen Source->Urine   POCT URINE PREGNANCY          Imaging Results          CT Abdomen Pelvis With Contrast (Final result)  Result time 04/07/22 13:19:19    Final result by Saad Light MD (04/07/22 13:19:19)                  Impression:      1. Findings suggesting left lower quadrant, short-segment proximal sigmoid colitis.  Differential considerations include infectious or inflammatory including Crohn's versus less likely ischemic in this age group.  2. Appendix is normal, and no CT findings identified to explain patient's symptoms of right lower quadrant pain.  3. Remote cholecystectomy.  4. Right renal 1.6 cm cortical cyst.  5. Few additional findings as above.      Electronically signed by: Saad Light MD  Date:    04/07/2022  Time:    13:19             Narrative:    EXAMINATION:  CT ABDOMEN PELVIS WITH CONTRAST    CLINICAL HISTORY:  LLQ abdominal pain;RLQ abdominal pain (Age >= 14y);    TECHNIQUE:  Low dose axial images, sagittal and coronal reformations were obtained from the lung bases to the pubic symphysis following the IV administration of 100 mL of Omnipaque 350 .  Oral contrast was not given.    COMPARISON:  CT renal stone study 10/22/2012    FINDINGS:  Superior most aspect of the hepatic dome not included at upper most image of acquisition.    Imaged lung bases are clear.  Base of the heart is normal limits.    Remote cholecystectomy.  Main portal vein appears patent.  Imaged liver is normal in size without discrete process seen.  Pancreas, spleen, stomach, duodenum and bilateral adrenal glands are within normal limits.  No biliary ductal dilatation.    Bilateral kidneys are normal in size, shape and location with relatively symmetric normal enhancement.  No hydronephrosis or significant perinephric stranding.  Left renal upper pole 1.6 cm simple cortical cyst.  Ureters are nondilated.  Urinary bladder is within normal limits.  Previous IUD has been removed.  Uterus and bilateral adnexal regions are within normal limits.  Suspected trace volume nonspecific free pelvic fluid, commonly physiologic in this age group.    No ascites, free air or lymphadenopathy by CT criteria.  No significant atherosclerosis.  No  aortic aneurysm or dissection.    Appendix and terminal ileum are within normal limits.  No evidence of bowel obstruction.  There is short-segment circumferential wall thickening of the proximal sigmoid colon within the left lower quadrant with subtle stranding of the adjacent mesocolon.  No pneumatosis or portal venous gas.    Small fat containing umbilical hernia with linear metallic density at the more anterior aspect unchanged.    Osseous structures are intact.                                 Medications   sodium chloride 0.9% bolus 1,000 mL (0 mLs Intravenous Stopped 4/7/22 1314)   ketorolac injection 9.999 mg (9.999 mg Intravenous Given 4/7/22 1213)   ondansetron injection 4 mg (4 mg Intravenous Given 4/7/22 1213)   iohexoL (OMNIPAQUE 350) injection 100 mL (100 mLs Intravenous Given 4/7/22 1303)     Medical Decision Making:   History:   Old Medical Records: I decided to obtain old medical records.  Initial Assessment:   Emergent evaluation of a 37-year-old female who presents to the emergency department with chief complaint of lower abdominal pain that began 3 days ago.  She also complains of left-sided mid abdominal and flank pain that began today.  She describes this pain as burning in nature.  She endorses nausea but denies vomiting or diarrhea.  Patient is afebrile, hemodynamically stable, nontoxic appearing.  Will order labs, imaging, and continue to monitor.  Differential Diagnosis:   Differential diagnosis includes but is not limited to uti, nephrolithiasis, pyelonephritis, colitis, diverticulitis.   Clinical Tests:   Lab Tests: Ordered and Reviewed  Radiological Study: Ordered and Reviewed  ED Management:  No severe metabolic or hematologic derangements.   Lipase within normal limits. UPT neg.   UA without infection.   CT abdomen pelvis reveals left lower quadrant short segment proximal sigmoid colitis.   Will treat with ciprofloxacin and flagyl. Recommend outpatient CRS f/u. Return precautions given.    All questions answered.   The patient was instructed to follow up with CRS or to return to the emergency department for worsening symptoms. The treatment plan was discussed with the patient who demonstrated understanding and comfort with plan. The patient's history, physical exam, and plan of care was discussed with and agreed upon with my supervising physician.                 Attending Attestation:     Physician Attestation Statement for NP/PA:   I discussed this assessment and plan of this patient with the NP/PA, but I did not personally examine the patient. The face to face encounter was performed by the NP/PA.    Other NP/PA Attestation Additions:    History of Present Illness: Abdominal pain               ED Course as of 04/08/22 0748   Thu Apr 07, 2022   1215 WBC: 4.03 [JM]   1215 Hemoglobin: 12.6 [JM]   1215 Hematocrit: 38.2 [JM]   1215 Platelets: 239 [JM]   1215 Preg Test, Ur: Negative [JM]   1215 Occult Blood UA: Negative [JM]   1215 NITRITE UA: Negative []   1215 Leukocytes, UA: Negative []      ED Course User Index  [JM] Stephani Barraza PA-C             Clinical Impression:   Final diagnoses:  [R10.30] Lower abdominal pain  [K52.9] Colitis (Primary)          ED Disposition Condition    Discharge Stable        ED Prescriptions     Medication Sig Dispense Start Date End Date Auth. Provider    ciprofloxacin HCl (CIPRO) 500 MG tablet Take 1 tablet (500 mg total) by mouth 2 (two) times daily. for 5 days 10 tablet 4/7/2022 4/12/2022 Stephani Barraza PA-C    metroNIDAZOLE (FLAGYL) 500 MG tablet Take 1 tablet (500 mg total) by mouth 3 (three) times daily. for 5 days 15 tablet 4/7/2022 4/12/2022 Stephani Barraza PA-C    naproxen (NAPROSYN) 500 MG tablet Take 1 tablet (500 mg total) by mouth 2 (two) times daily as needed (pain). 10 tablet 4/7/2022  Stephani Barraza PA-C        Follow-up Information     Follow up With Specialties Details Why Contact Info Additional Information    [facility]  Emergency Dept Emergency Medicine Go to  If symptoms worsen [address]  [facility] [id number] [phone]     [facility] Rectal [facility] Schedule an appointment as soon as possible for a visit in 1 week  [address]  [facility] [id number] [phone] [facility] Please park in [facility] and use [facility] elevator           Stephani Barraza PA-C  04/07/22 1928       Vazquez Ho III, MD  04/08/22 0754

## 2022-04-07 NOTE — DISCHARGE INSTRUCTIONS
Your CT scan is concerning for colitis in your sigmoid colon. Please take antibiotics as prescribed and to completion. Take tylenol and naproxen as needed for pain. Do not take naproxen with other anti inflammatories. Follow up with colon and rectal surgery or return to the ER for any new or worsening symptoms.

## 2022-04-11 ENCOUNTER — TELEPHONE (OUTPATIENT)
Dept: GASTROENTEROLOGY | Facility: CLINIC | Age: 37
End: 2022-04-11
Payer: MEDICAID

## 2022-04-11 NOTE — TELEPHONE ENCOUNTER
----- Message from Chaya Perez sent at 4/11/2022 10:03 AM CDT -----  Contact: pt  Pt requesting call back re: scheduling appt ASAP for hospital f/u for ulcerative colitis. Pt would like Dr. Vargas if possible.     Confirmed contact below:  Contact Name:Aniket Ac  Phone Number: [phone]

## 2022-04-11 NOTE — TELEPHONE ENCOUNTER
Return call and spoke with patient. Scheduled patient to see a GI provider at the [place] location.     Patient scheduled appointment patient verbalized understanding.

## 2022-04-19 ENCOUNTER — OFFICE VISIT (OUTPATIENT)
Dept: GASTROENTEROLOGY | Facility: CLINIC | Age: 37
End: 2022-04-19
Payer: MEDICAID

## 2022-04-19 VITALS — HEIGHT: 69 IN | BODY MASS INDEX: 38.11 KG/M2 | WEIGHT: 257.31 LBS

## 2022-04-19 DIAGNOSIS — R10.13 EPIGASTRIC PAIN: ICD-10-CM

## 2022-04-19 DIAGNOSIS — K21.9 GASTROESOPHAGEAL REFLUX DISEASE, UNSPECIFIED WHETHER ESOPHAGITIS PRESENT: ICD-10-CM

## 2022-04-19 DIAGNOSIS — K52.9 COLITIS: Primary | ICD-10-CM

## 2022-04-19 PROBLEM — U07.1 COVID-19 VIRUS INFECTION: Status: RESOLVED | Noted: 2020-12-11 | Resolved: 2022-04-19

## 2022-04-19 PROCEDURE — 99999 PR PBB SHADOW E&M-EST. PATIENT-LVL III: ICD-10-PCS | Mod: PBBFAC,,, | Performed by: NURSE PRACTITIONER

## 2022-04-19 PROCEDURE — 1160F PR REVIEW ALL MEDS BY PRESCRIBER/CLIN PHARMACIST DOCUMENTED: ICD-10-PCS | Mod: CPTII,,, | Performed by: NURSE PRACTITIONER

## 2022-04-19 PROCEDURE — 99999 PR PBB SHADOW E&M-EST. PATIENT-LVL III: CPT | Mod: PBBFAC,,, | Performed by: NURSE PRACTITIONER

## 2022-04-19 PROCEDURE — 99203 PR OFFICE/OUTPT VISIT, NEW, LEVL III, 30-44 MIN: ICD-10-PCS | Mod: S$PBB,,, | Performed by: NURSE PRACTITIONER

## 2022-04-19 PROCEDURE — 3008F BODY MASS INDEX DOCD: CPT | Mod: CPTII,,, | Performed by: NURSE PRACTITIONER

## 2022-04-19 PROCEDURE — 99203 OFFICE O/P NEW LOW 30 MIN: CPT | Mod: S$PBB,,, | Performed by: NURSE PRACTITIONER

## 2022-04-19 PROCEDURE — 1159F PR MEDICATION LIST DOCUMENTED IN MEDICAL RECORD: ICD-10-PCS | Mod: CPTII,,, | Performed by: NURSE PRACTITIONER

## 2022-04-19 PROCEDURE — 1160F RVW MEDS BY RX/DR IN RCRD: CPT | Mod: CPTII,,, | Performed by: NURSE PRACTITIONER

## 2022-04-19 PROCEDURE — 99213 OFFICE O/P EST LOW 20 MIN: CPT | Mod: PBBFAC,PO | Performed by: NURSE PRACTITIONER

## 2022-04-19 PROCEDURE — 3008F PR BODY MASS INDEX (BMI) DOCUMENTED: ICD-10-PCS | Mod: CPTII,,, | Performed by: NURSE PRACTITIONER

## 2022-04-19 PROCEDURE — 1159F MED LIST DOCD IN RCRD: CPT | Mod: CPTII,,, | Performed by: NURSE PRACTITIONER

## 2022-04-19 RX ORDER — OMEPRAZOLE 40 MG/1
40 CAPSULE, DELAYED RELEASE ORAL DAILY
Qty: 30 CAPSULE | Refills: 2 | Status: SHIPPED | OUTPATIENT
Start: 2022-04-19 | End: 2023-04-19

## 2022-04-19 RX ORDER — SODIUM, POTASSIUM,MAG SULFATES 17.5-3.13G
1 SOLUTION, RECONSTITUTED, ORAL ORAL DAILY
Qty: 1 KIT | Refills: 0 | Status: SHIPPED | OUTPATIENT
Start: 2022-04-19 | End: 2022-04-21

## 2022-04-19 NOTE — PATIENT INSTRUCTIONS
- Take omeprazole 40 mg every morning on an empty stomach 30-45 minutes before food or other medication.    SUPREP Instructions    You are scheduled for a colonoscopy with Dr. Samuels on 5/4/2022 at [facility] in [place], [place].   To ensure that your test is accurate and complete, you MUST follow these instructions listed below.  If you have any questions, please call our office at [phone].  Plan on being at the hospital for your procedure for 3-4 hours.    1.  Follow a CLEAR LIQUID DIET for the entire day before your scheduled colonoscopy.  This means no solid food the entire day starting when you wake.  You may have as much of the clear liquids as you want throughout the day.   CLEAR LIQUID DIET:   - Avoid Red, Orange, Purple, and/or Blue food coloring   - NO DAIRY   - You can have:  Coffee with sugar (no creamer), tea, water, soda, apple or white grape juice, chicken or beef broth/bouillon (no meat, noodles, or veggies), green/yellow popsicles, green/yellow Jell-O, lemonade.    2.  AT 5 pm the evening before your colonoscopy, POUR ONE (1) BOTTLE OF SUPREP INTO THE MIXING CONTAINER, PROVIDED INSIDE THE BOX.  ADD WATER TO THE LINE ON THE CONTAINER AND MIX IT WELL.  DRINK THE ENTIRE CONTAINER AND THEN DRINK TWO (2) MORE CONTAINERS OF WATER OVER THE NEXT 1 HOUR.  This is sometimes easier to drink if this solution is cold, so you can mix the solution 20 minutes ahead of time and place in the refrigerator prior to drinking.  You have to drink the solution within 30-45 minutes of mixing it.  Do NOT put this solution over ice.  It IS ok to drink with a straw.    3.  The endoscopy department will call you 1 day before your colonoscopy to tell you the exact time to arrive, AND to tell you the exact time to drink the 2nd portion of your prep (which will be FIVE HOURS BEFORE YOUR ARRIVAL TIME).  At this time given to you, POUR ONE (1) BOTTLE OF SUPREP INTO THE MIXING CONTAINER, PROVIDED INSIDE THE BOX.  ADD WATER  TO THE LINE ON THE CONTAINER AND MIX IT WELL.  DRINK THE ENTIRE CONTAINER AND THEN DRINK TWO (2) MORE CONTAINERS OF WATER OVER THE NEXT 1 HOUR.  This is sometimes easier to drink if this solution is cold, so you can mix the solution 20 minutes ahead of time and place in the refrigerator prior to drinking.  You have to drink the solution within 30-45 minutes of mixing it.  Do NOT put this solution over ice.  It IS ok to drink with a straw.  Once this is complete, you may not have ANYTHING else by mouth!    4.  You must have someone with you to DRIVE YOU HOME since you will be receiving IV sedation for the colonoscopy.    5.  It is ok to take MOST of your REGULAR MEDICATIONS  in the morning of your test with a SIP of water.  THE ONLY MEDS YOU NEED TO HOLD ARE YOUR DIABETES MEDICATIONS,  SOME BLOOD PRESSURE MEDS, AND BLOOD THINNERS IF OK'D BY YOUR DOCTOR.  Do NOT have anything else to eat or drink the morning of your colonoscopy.  It is ok to brush your teeth.    6.  If you are on blood thinners THAT YOU HAVE BEEN INSTRUCTED TO HOLD BY YOUR DOCTOR FOR THIS PROCEDURE, then do NOT take this the morning of your colonoscopy.  Do NOT stop these medications on your own, they must be approved to be held by your doctor.  Your colonoscopy can NOT be done if you are on these medications.  Examples of blood thinners include: Coumadin, Aggrenox, Plavix, Pradaxa, Reapro, Pletal, Xarelto, Ticagrelor, Brilinta, Eliquis, and high dose aspirin (325 mg).  You do not have to stop baby aspirin 81 mg.    7.  IF YOU ARE DIABETIC:  NO INSULIN OR ORAL MEDICATIONS THE MORNING OF THE COLONOSCOPY.  TAKE ONLY HALF THE DOSE OF YOUR INSULIN THE DAY BEFORE THE COLONOSCOPY.  DO NOT TAKE ANY ORAL DIABETIC MEDICATIONS THE DAY BEFORE THE COLONOSCOPY.  IF YOU ARE AN INSULIN DEPENDENT DIABETIC WITH UNSTABLE BLOOD SUGARS, NOTIFY YOUR PRIMARY CARE PHYSICIAN FOR INSTRUCTIONS.

## 2022-04-19 NOTE — PROGRESS NOTES
"  Subjective:       Patient ID: Aniket Ac is a 37 y.o. female.    Chief Complaint: Follow-up    36 y/o female with hypertension presents to clinic for hospital follow up. Patient presented to [facility] ED 2022 with c/o abdominal pain. CT abd revealed findings consistent with sigmoid colitis. Patient treated with 7 day course of oral Cipro and Flagyl. Reports lower abdominal pain has moderately improved. States stool is soft and mushy; no blood or melena. Has noticed upper abdominal pain and heart burn after certain foods including caffienated beverages or spicy foods. Taking TUMS throughout the day with not improvement. Denies nausea or vomiting, dysphagia. No FH IBD, colon polyps or cancer.       Past Medical History:   Diagnosis Date    COVID-19 virus infection 2020    Essential hypertension 2020       Past Surgical History:   Procedure Laterality Date     SECTION, CLASSIC      CHOLECYSTECTOMY      TUBAL LIGATION         No family history on file.    Social History     Socioeconomic History    Marital status:    Tobacco Use    Smoking status: Never Smoker    Smokeless tobacco: Never Used   Substance and Sexual Activity    Alcohol use: Yes    Drug use: No       Review of Systems   Constitutional: Negative for appetite change and unexpected weight change.   HENT: Negative for trouble swallowing.    Respiratory: Negative for shortness of breath.    Cardiovascular: Negative for chest pain.   Gastrointestinal: Positive for abdominal pain. Negative for blood in stool, nausea and vomiting.   Musculoskeletal: Negative for back pain.   Neurological: Negative for dizziness and weakness.   Hematological: Negative for adenopathy. Does not bruise/bleed easily.   Psychiatric/Behavioral: Negative for dysphoric mood.         Objective:     Vitals:    22 0920   Weight: 116.7 kg (257 lb 4.8 oz)   Height: 5' 9" (1.753 m)          Physical Exam  Constitutional:       General: " She is not in acute distress.     Appearance: Normal appearance. She is not ill-appearing.   Eyes:      Conjunctiva/sclera: Conjunctivae normal.      Pupils: Pupils are equal, round, and reactive to light.   Pulmonary:      Effort: Pulmonary effort is normal. No respiratory distress.   Musculoskeletal:         General: Normal range of motion.      Cervical back: Normal range of motion.   Skin:     General: Skin is warm and dry.   Neurological:      Mental Status: She is alert and oriented to person, place, and time.   Psychiatric:         Mood and Affect: Mood normal.         Behavior: Behavior normal.               Assessment:         ICD-10-CM ICD-9-CM   1. Colitis  K52.9 558.9   2. Gastroesophageal reflux disease, unspecified whether esophagitis present  K21.9 530.81   3. Epigastric pain  R10.13 789.06       Plan:       Colitis  -     Case Request Endoscopy: COLONOSCOPY  -     SUPREP BOWEL PREP KIT 17.5-3.13-1.6 gram SolR; Take 177 mLs by mouth once daily. for 2 days  Dispense: 1 kit; Refill: 0    Gastroesophageal reflux disease, unspecified whether esophagitis present  -     omeprazole (PRILOSEC) 40 MG capsule; Take 1 capsule (40 mg total) by mouth once daily.  Dispense: 30 capsule; Refill: 2    Epigastric pain  -     omeprazole (PRILOSEC) 40 MG capsule; Take 1 capsule (40 mg total) by mouth once daily.  Dispense: 30 capsule; Refill: 2      Follow up if symptoms worsen or fail to improve.     Patient's Medications   New Prescriptions    OMEPRAZOLE (PRILOSEC) 40 MG CAPSULE    Take 1 capsule (40 mg total) by mouth once daily.    SUPREP BOWEL PREP KIT 17.5-3.13-1.6 GRAM SOLR    Take 177 mLs by mouth once daily. for 2 days   Previous Medications    LIDOCAINE (LIDODERM) 5 %    Place 1 patch onto the skin once daily. Remove & Discard patch within 12 hours or as directed by MD    NAPROXEN (NAPROSYN) 500 MG TABLET    Take 1 tablet (500 mg total) by mouth 2 (two) times daily as needed (pain).    TURMERIC ROOT EXTRACT  ORAL    Take 500 mg by mouth.   Modified Medications    No medications on file   Discontinued Medications    CETIRIZINE (ZYRTEC) 10 MG TABLET    Take 1 tablet (10 mg total) by mouth once daily.    DICLOFENAC (VOLTAREN) 50 MG EC TABLET    Take 50 mg by mouth 2 (two) times daily as needed.    GABAPENTIN (NEURONTIN) 100 MG CAPSULE    Take 100 mg by mouth 2 (two) times daily.    IBUPROFEN (ADVIL,MOTRIN) 600 MG TABLET    Take 1 tablet (600 mg total) by mouth every 8 (eight) hours as needed for Pain.    MULTIVITAMIN TAB    Take 1 tablet by mouth once daily.

## 2022-04-23 ENCOUNTER — HOSPITAL ENCOUNTER (EMERGENCY)
Facility: HOSPITAL | Age: 37
Discharge: HOME | End: 2022-04-23
Attending: EMERGENCY MEDICINE
Payer: MEDICAID

## 2022-04-23 VITALS
HEART RATE: 75 BPM | DIASTOLIC BLOOD PRESSURE: 97 MMHG | SYSTOLIC BLOOD PRESSURE: 132 MMHG | OXYGEN SATURATION: 98 % | TEMPERATURE: 98 F | RESPIRATION RATE: 16 BRPM | HEIGHT: 69 IN | WEIGHT: 250 LBS | BODY MASS INDEX: 37.03 KG/M2

## 2022-04-23 DIAGNOSIS — S32.2XXA CLOSED FRACTURE OF COCCYX, INITIAL ENCOUNTER: Primary | ICD-10-CM

## 2022-04-23 DIAGNOSIS — S39.92XA TAILBONE INJURY: ICD-10-CM

## 2022-04-23 DIAGNOSIS — R07.9 CHEST PAIN: ICD-10-CM

## 2022-04-23 LAB
B-HCG UR QL: NEGATIVE
CTP QC/QA: YES

## 2022-04-23 PROCEDURE — 63600175 PHARM REV CODE 636 W HCPCS: Performed by: PHYSICIAN ASSISTANT

## 2022-04-23 PROCEDURE — 93010 EKG 12-LEAD: ICD-10-PCS | Mod: ,,, | Performed by: INTERNAL MEDICINE

## 2022-04-23 PROCEDURE — 93010 ELECTROCARDIOGRAM REPORT: CPT | Mod: ,,, | Performed by: INTERNAL MEDICINE

## 2022-04-23 PROCEDURE — 93005 ELECTROCARDIOGRAM TRACING: CPT

## 2022-04-23 PROCEDURE — 25000003 PHARM REV CODE 250: Performed by: PHYSICIAN ASSISTANT

## 2022-04-23 PROCEDURE — 81025 URINE PREGNANCY TEST: CPT | Performed by: PHYSICIAN ASSISTANT

## 2022-04-23 PROCEDURE — 96372 THER/PROPH/DIAG INJ SC/IM: CPT | Performed by: PHYSICIAN ASSISTANT

## 2022-04-23 PROCEDURE — 99284 EMERGENCY DEPT VISIT MOD MDM: CPT | Mod: 25

## 2022-04-23 RX ORDER — LIDOCAINE 50 MG/G
1 PATCH TOPICAL
Status: DISCONTINUED | OUTPATIENT
Start: 2022-04-23 | End: 2022-04-24 | Stop reason: HOSPADM

## 2022-04-23 RX ORDER — KETOROLAC TROMETHAMINE 30 MG/ML
30 INJECTION, SOLUTION INTRAMUSCULAR; INTRAVENOUS
Status: COMPLETED | OUTPATIENT
Start: 2022-04-23 | End: 2022-04-23

## 2022-04-23 RX ORDER — DIPHENHYDRAMINE HYDROCHLORIDE 50 MG/ML
25 INJECTION INTRAMUSCULAR; INTRAVENOUS
Status: COMPLETED | OUTPATIENT
Start: 2022-04-23 | End: 2022-04-23

## 2022-04-23 RX ORDER — ONDANSETRON 4 MG/1
4 TABLET, ORALLY DISINTEGRATING ORAL
Status: COMPLETED | OUTPATIENT
Start: 2022-04-23 | End: 2022-04-23

## 2022-04-23 RX ORDER — MORPHINE SULFATE 4 MG/ML
6 INJECTION, SOLUTION INTRAMUSCULAR; INTRAVENOUS
Status: COMPLETED | OUTPATIENT
Start: 2022-04-23 | End: 2022-04-23

## 2022-04-23 RX ORDER — DICYCLOMINE HYDROCHLORIDE 10 MG/1
20 CAPSULE ORAL
Status: COMPLETED | OUTPATIENT
Start: 2022-04-23 | End: 2022-04-23

## 2022-04-23 RX ORDER — LIDOCAINE 50 MG/G
1 PATCH TOPICAL
Qty: 5 PATCH | Refills: 0 | Status: SHIPPED | OUTPATIENT
Start: 2022-04-23

## 2022-04-23 RX ORDER — OXYCODONE AND ACETAMINOPHEN 5; 325 MG/1; MG/1
1 TABLET ORAL EVERY 4 HOURS PRN
Qty: 15 TABLET | Refills: 0 | Status: SHIPPED | OUTPATIENT
Start: 2022-04-23 | End: 2023-03-20

## 2022-04-23 RX ADMIN — KETOROLAC TROMETHAMINE 30 MG: 30 INJECTION, SOLUTION INTRAMUSCULAR at 08:04

## 2022-04-23 RX ADMIN — ONDANSETRON 4 MG: 4 TABLET, ORALLY DISINTEGRATING ORAL at 10:04

## 2022-04-23 RX ADMIN — DIPHENHYDRAMINE HYDROCHLORIDE 25 MG: 50 INJECTION, SOLUTION INTRAMUSCULAR; INTRAVENOUS at 10:04

## 2022-04-23 RX ADMIN — DICYCLOMINE HYDROCHLORIDE 20 MG: 10 CAPSULE ORAL at 10:04

## 2022-04-23 RX ADMIN — LIDOCAINE 1 PATCH: 50 PATCH TOPICAL at 08:04

## 2022-04-23 RX ADMIN — MORPHINE SULFATE 6 MG: 4 INJECTION INTRAVENOUS at 09:04

## 2022-04-24 NOTE — ED NOTES
Pt states she fell last year and ever since has been having pain intermittently at her lower back, pt is alert and oriented x 4, pt rates pain at 7.

## 2022-04-24 NOTE — ED PROVIDER NOTES
Encounter Date: 2022       History     Chief Complaint   Patient presents with    Tailbone Pain     PT reports she fell several times years ago in the bathtub. Since he has been staying in the ICU with her dad. She is having severe pain in her tailbone and lower back.      Chief Complaint: Tailbone pain  History of  Present Illness: History obtained from patient. This 37 y.o. female who has past medical history of hypertension presents to the ED complaining of tailbone pain that is mechanical slip and fall 1 year ago.  Patient states she has had intermittent pain in her tailbone since then.  Patient states that she has never been evaluated.  Patient reports increased pain with long periods of sitting.  Patient states that she has been visiting her father in the ICU in the last few days and reports increased pain.  No prior treatment for symptoms.  Denies urinary incontinence, bowel incontinence, saddle anesthesia, numbness, tingling, weakness, abdominal pain, nausea, vomiting, fever, dysuria, hematuria, urinary frequency.          Review of patient's allergies indicates:   Allergen Reactions    Penicillins      Past Medical History:   Diagnosis Date    COVID-19 virus infection 2020    Essential hypertension 2020     Past Surgical History:   Procedure Laterality Date     SECTION, CLASSIC      CHOLECYSTECTOMY      TUBAL LIGATION       No family history on file.  Social History     Tobacco Use    Smoking status: Never Smoker    Smokeless tobacco: Never Used   Substance Use Topics    Alcohol use: Yes    Drug use: No     Review of Systems   Constitutional: Negative for chills and fever.   HENT: Negative for congestion, rhinorrhea and sore throat.    Eyes: Negative for visual disturbance.   Respiratory: Negative for cough and shortness of breath.    Cardiovascular: Negative for chest pain.   Gastrointestinal: Negative for abdominal pain, diarrhea, nausea and vomiting.   Genitourinary:  Negative for dysuria, frequency and hematuria.   Musculoskeletal: Negative for back pain.        (+) tailbone pain   Skin: Negative for rash.   Neurological: Negative for dizziness, weakness and headaches.        (-) urinary incontinence  (-) bowel incontinence  (-) saddle anesthesia  (-) urinary retention         Physical Exam     Initial Vitals [04/23/22 1956]   BP Pulse Resp Temp SpO2   (!) 142/88 88 19 98.8 °F (37.1 °C) 98 %      MAP       --         Physical Exam    Nursing note and vitals reviewed.  Constitutional: She appears well-developed and well-nourished. No distress.   HENT:   Head: Normocephalic and atraumatic.   Right Ear: Tympanic membrane normal.   Left Ear: Tympanic membrane normal.   Nose: Nose normal.   Mouth/Throat: Uvula is midline, oropharynx is clear and moist and mucous membranes are normal.   Eyes: EOM are normal. Pupils are equal, round, and reactive to light.   Neck: Trachea normal and phonation normal. Neck supple. No stridor present.   Normal range of motion.   Full passive range of motion without pain.     Cardiovascular: Normal rate, regular rhythm and normal heart sounds. Exam reveals no gallop and no friction rub.    No murmur heard.  Pulmonary/Chest: Effort normal and breath sounds normal. No respiratory distress. She has no wheezes. She has no rhonchi. She has no rales.   Abdominal: Abdomen is soft. Bowel sounds are normal. There is no abdominal tenderness. There is no rebound and no guarding.   Musculoskeletal:         General: Normal range of motion.      Cervical back: Full passive range of motion without pain, normal range of motion and neck supple. No rigidity. No spinous process tenderness or muscular tenderness. Normal range of motion.      Comments: No C-spine, T-spine or L-spine midline tenderness.  There is no tenderness to the paraspinal musculature.  There is tenderness palpation to the superior gluteal cleft.  There is full range of motion of the bilateral upper and  lower extremities.     Neurological: She is alert and oriented to person, place, and time. She has normal strength. No cranial nerve deficit or sensory deficit.   Skin: Skin is warm and dry. Capillary refill takes less than 2 seconds.   Psychiatric: She has a normal mood and affect.         ED Course   Procedures  Labs Reviewed   POCT URINE PREGNANCY          Imaging Results          X-Ray Sacrum And Coccyx (Final result)  Result time 04/23/22 21:22:17    Final result by Landon Hanna MD (04/23/22 21:22:17)                 Impression:      Questionable acute distal coccygeal fracture.      Electronically signed by: Landon Hanna MD  Date:    04/23/2022  Time:    21:22             Narrative:    EXAMINATION:  XR SACRUM AND COCCYX    CLINICAL HISTORY:  Unspecified injury of lower back, initial encounter    TECHNIQUE:  Three views of the sacrum and coccyx were performed.    COMPARISON:  Remote CT from 20/12.    FINDINGS:  There is posterior cortical step-off seen involving the lower aspect of the coccyx.  This may potentially represent acute fracture if setting of recent trauma.  This finding is new compared to remote CT from 2012.  No additional acute sacral or coccygeal abnormalities are seen.                                 Medications   LIDOcaine 5 % patch 1 patch (1 patch Transdermal Patch Applied 4/23/22 2048)   ketorolac injection 30 mg (30 mg Intramuscular Given 4/23/22 2047)     Medical Decision Making:   ED Management:  This is an evaluation of a 37 y.o. female that presents to the Emergency Department for tele pain status post mechanical slip and fall 1 year ago with worsening with long periods of sitting.  Denies fever, rash, night sweats, weight loss, dysuria, bowel/bladder incontinence, or IV\SQ drug use. The patient is a non-toxic, afebrile, and well appearing female. On physical exam, there is tenderness to the superior gluteal cleft.  There is no abdominal pain to palpation, CVA tenderness, or  saddle anesthesia. Strength and sensation are symmetric bilaterally.  Upper and lower extremity pulses are symmetrical. There is no rash, erythema, open wounds, or increased warmth over the back.      Vital signs reassuring.     X-ray of the sacrum and coccyx shows questionable acute distal coccygeal fracture.    Given the above findings, my overall impression is coccyx fracture. Given the above findings, I do not think the patient has acute vertebral fracture, subluxation, dislocation, sciatica, epidural abscess, cauda equina, shingles, UTI.    Patient reported improvement of symptoms after treatment with Toradol and Lidoderm.  Will discharge patient home symptomatic care.  Encourage follow-up with Orthopedics.    The diagnosis, treatment plan, instructions for follow-up and reevaluation with primary care as well as ED return precautions were discussed and understanding was verbalized. All questions or concerns have been addressed.                         Clinical Impression:   Final diagnoses:  [S39.92XA] Tailbone injury  [S32.2XXA] Closed fracture of coccyx, initial encounter (Primary)          ED Disposition Condition    Discharge Stable        ED Prescriptions     Medication Sig Dispense Start Date End Date Auth. Provider    oxyCODONE-acetaminophen (PERCOCET) 5-325 mg per tablet Take 1 tablet by mouth every 4 (four) hours as needed for Pain. 15 tablet 4/23/2022  Christiano Catalan PA-C    LIDOcaine (LIDODERM) 5 % Place 1 patch onto the skin every 24 hours as needed. Remove & Discard patch within 12 hours after application 5 patch 4/23/2022  Christiano Catalan PA-C        Follow-up Information     Follow up With Specialties Details Why Contact [facility] Surgical Oncology, Orthopedic Surgery, Genetics, Physical Medicine and Rehabilitation, Occupational Therapy, Radiology Schedule an appointment as soon as possible for a visit in 1 day For reevaluation with Orthopedics [address]  [facility] [address]  [phone]      [facility] Emergency Dept Emergency Medicine Go in 1 day If symptoms worsen [address]  [facility] [address] [phone]           Christiano Catalan PA-C  04/23/22 [id number]

## 2022-10-16 ENCOUNTER — HOSPITAL ENCOUNTER (OUTPATIENT)
Facility: HOSPITAL | Age: 37
Discharge: HOME | End: 2022-10-18
Attending: EMERGENCY MEDICINE | Admitting: INTERNAL MEDICINE
Payer: MEDICAID

## 2022-10-16 DIAGNOSIS — R07.9 CHEST PAIN: ICD-10-CM

## 2022-10-16 DIAGNOSIS — J98.01 ACUTE BRONCHOSPASM: ICD-10-CM

## 2022-10-16 DIAGNOSIS — J68.0: ICD-10-CM

## 2022-10-16 DIAGNOSIS — J98.01 BRONCHOSPASM: Primary | ICD-10-CM

## 2022-10-16 PROCEDURE — 94640 AIRWAY INHALATION TREATMENT: CPT

## 2022-10-16 PROCEDURE — 99285 EMERGENCY DEPT VISIT HI MDM: CPT | Mod: 25

## 2022-10-16 PROCEDURE — 94761 N-INVAS EAR/PLS OXIMETRY MLT: CPT

## 2022-10-16 PROCEDURE — 25000242 PHARM REV CODE 250 ALT 637 W/ HCPCS: Performed by: EMERGENCY MEDICINE

## 2022-10-16 RX ORDER — IPRATROPIUM BROMIDE AND ALBUTEROL SULFATE 2.5; .5 MG/3ML; MG/3ML
3 SOLUTION RESPIRATORY (INHALATION) ONCE
Status: COMPLETED | OUTPATIENT
Start: 2022-10-16 | End: 2022-10-16

## 2022-10-16 RX ADMIN — IPRATROPIUM BROMIDE AND ALBUTEROL SULFATE 3 ML: 2.5; .5 SOLUTION RESPIRATORY (INHALATION) at 11:10

## 2022-10-16 NOTE — Clinical Note
Diagnosis: Bronchospasm [[id number]]   Future Attending Provider: JESSICA BRITT [[id number]]   Admitting Provider:: JESSICA BRITT [[id number]]   Special Needs:: No Special Needs [1]

## 2022-10-16 NOTE — LETTER
October 18, 2022    Aniket Ac  [address] [place] [address]            [address] [place] [address]  Phone: [phone] October 18, 2022     Patient: Aniket Ac   YOB: 1985   Date of Visit: 10/16/2022       To Whom It May Concern:    It is my medical opinion that Aniket Ac may return to work on 10/24/22.    If you have any questions or concerns, please don't hesitate to call.    Sincerely,        REESE Marie MD

## 2022-10-17 PROBLEM — J98.01 ACUTE BRONCHOSPASM: Status: ACTIVE | Noted: 2022-10-17

## 2022-10-17 LAB
ALBUMIN SERPL BCP-MCNC: 3.8 G/DL (ref 3.5–5.2)
ALP SERPL-CCNC: 62 U/L (ref 55–135)
ALT SERPL W/O P-5'-P-CCNC: 17 U/L (ref 10–44)
ANION GAP SERPL CALC-SCNC: 11 MMOL/L (ref 8–16)
ANION GAP SERPL CALC-SCNC: 15 MMOL/L (ref 8–16)
AST SERPL-CCNC: 24 U/L (ref 10–40)
B-HCG UR QL: NEGATIVE
BASOPHILS # BLD AUTO: 0.04 K/UL (ref 0–0.2)
BASOPHILS NFR BLD: 0.6 % (ref 0–1.9)
BILIRUB SERPL-MCNC: 0.5 MG/DL (ref 0.1–1)
BUN SERPL-MCNC: 11 MG/DL (ref 6–20)
BUN SERPL-MCNC: 12 MG/DL (ref 6–20)
CALCIUM SERPL-MCNC: 9.2 MG/DL (ref 8.7–10.5)
CALCIUM SERPL-MCNC: 9.4 MG/DL (ref 8.7–10.5)
CHLORIDE SERPL-SCNC: 107 MMOL/L (ref 95–110)
CHLORIDE SERPL-SCNC: 109 MMOL/L (ref 95–110)
CO2 SERPL-SCNC: 15 MMOL/L (ref 23–29)
CO2 SERPL-SCNC: 18 MMOL/L (ref 23–29)
CREAT SERPL-MCNC: 0.9 MG/DL (ref 0.5–1.4)
CREAT SERPL-MCNC: 0.9 MG/DL (ref 0.5–1.4)
CTP QC/QA: YES
DIFFERENTIAL METHOD: ABNORMAL
EOSINOPHIL # BLD AUTO: 0.1 K/UL (ref 0–0.5)
EOSINOPHIL NFR BLD: 1 % (ref 0–8)
ERYTHROCYTE [DISTWIDTH] IN BLOOD BY AUTOMATED COUNT: 13.2 % (ref 11.5–14.5)
EST. GFR  (NO RACE VARIABLE): >60 ML/MIN/1.73 M^2
EST. GFR  (NO RACE VARIABLE): >60 ML/MIN/1.73 M^2
GLUCOSE SERPL-MCNC: 109 MG/DL (ref 70–110)
GLUCOSE SERPL-MCNC: 153 MG/DL (ref 70–110)
HCT VFR BLD AUTO: 37.6 % (ref 37–48.5)
HGB BLD-MCNC: 12.9 G/DL (ref 12–16)
IMM GRANULOCYTES # BLD AUTO: 0.02 K/UL (ref 0–0.04)
IMM GRANULOCYTES NFR BLD AUTO: 0.3 % (ref 0–0.5)
LYMPHOCYTES # BLD AUTO: 2.5 K/UL (ref 1–4.8)
LYMPHOCYTES NFR BLD: 36.3 % (ref 18–48)
MCH RBC QN AUTO: 25.6 PG (ref 27–31)
MCHC RBC AUTO-ENTMCNC: 34.3 G/DL (ref 32–36)
MCV RBC AUTO: 75 FL (ref 82–98)
MONOCYTES # BLD AUTO: 0.5 K/UL (ref 0.3–1)
MONOCYTES NFR BLD: 6.5 % (ref 4–15)
NEUTROPHILS # BLD AUTO: 3.9 K/UL (ref 1.8–7.7)
NEUTROPHILS NFR BLD: 55.3 % (ref 38–73)
NRBC BLD-RTO: 0 /100 WBC
PLATELET # BLD AUTO: 256 K/UL (ref 150–450)
PMV BLD AUTO: 10.7 FL (ref 9.2–12.9)
POTASSIUM SERPL-SCNC: 4.1 MMOL/L (ref 3.5–5.1)
POTASSIUM SERPL-SCNC: 5.7 MMOL/L (ref 3.5–5.1)
PROT SERPL-MCNC: 7.6 G/DL (ref 6–8.4)
RBC # BLD AUTO: 5.03 M/UL (ref 4–5.4)
SODIUM SERPL-SCNC: 137 MMOL/L (ref 136–145)
SODIUM SERPL-SCNC: 138 MMOL/L (ref 136–145)
WBC # BLD AUTO: 6.95 K/UL (ref 3.9–12.7)

## 2022-10-17 PROCEDURE — 63600175 PHARM REV CODE 636 W HCPCS: Performed by: EMERGENCY MEDICINE

## 2022-10-17 PROCEDURE — 80048 BASIC METABOLIC PNL TOTAL CA: CPT | Mod: XB | Performed by: EMERGENCY MEDICINE

## 2022-10-17 PROCEDURE — 25000242 PHARM REV CODE 250 ALT 637 W/ HCPCS: Performed by: EMERGENCY MEDICINE

## 2022-10-17 PROCEDURE — 25000003 PHARM REV CODE 250: Performed by: EMERGENCY MEDICINE

## 2022-10-17 PROCEDURE — 94640 AIRWAY INHALATION TREATMENT: CPT | Mod: XB

## 2022-10-17 PROCEDURE — G0378 HOSPITAL OBSERVATION PER HR: HCPCS

## 2022-10-17 PROCEDURE — 81025 URINE PREGNANCY TEST: CPT | Performed by: EMERGENCY MEDICINE

## 2022-10-17 PROCEDURE — 80053 COMPREHEN METABOLIC PANEL: CPT | Performed by: EMERGENCY MEDICINE

## 2022-10-17 PROCEDURE — 96374 THER/PROPH/DIAG INJ IV PUSH: CPT

## 2022-10-17 PROCEDURE — 94640 AIRWAY INHALATION TREATMENT: CPT

## 2022-10-17 PROCEDURE — 85025 COMPLETE CBC W/AUTO DIFF WBC: CPT | Performed by: EMERGENCY MEDICINE

## 2022-10-17 PROCEDURE — 96376 TX/PRO/DX INJ SAME DRUG ADON: CPT

## 2022-10-17 PROCEDURE — 96372 THER/PROPH/DIAG INJ SC/IM: CPT | Mod: 59 | Performed by: EMERGENCY MEDICINE

## 2022-10-17 PROCEDURE — 96361 HYDRATE IV INFUSION ADD-ON: CPT

## 2022-10-17 PROCEDURE — 27000221 HC OXYGEN, UP TO 24 HOURS

## 2022-10-17 PROCEDURE — 25500020 PHARM REV CODE 255: Performed by: EMERGENCY MEDICINE

## 2022-10-17 PROCEDURE — 94760 N-INVAS EAR/PLS OXIMETRY 1: CPT

## 2022-10-17 RX ORDER — DEXAMETHASONE SODIUM PHOSPHATE 4 MG/ML
4 INJECTION, SOLUTION INTRA-ARTICULAR; INTRALESIONAL; INTRAMUSCULAR; INTRAVENOUS; SOFT TISSUE DAILY
Status: DISCONTINUED | OUTPATIENT
Start: 2022-10-17 | End: 2022-10-17

## 2022-10-17 RX ORDER — ALBUTEROL SULFATE 2.5 MG/.5ML
2.5 SOLUTION RESPIRATORY (INHALATION)
Status: DISCONTINUED | OUTPATIENT
Start: 2022-10-18 | End: 2022-10-18 | Stop reason: HOSPADM

## 2022-10-17 RX ORDER — NALOXONE HCL 0.4 MG/ML
0.02 VIAL (ML) INJECTION
Status: DISCONTINUED | OUTPATIENT
Start: 2022-10-17 | End: 2022-10-18 | Stop reason: HOSPADM

## 2022-10-17 RX ORDER — SODIUM CHLORIDE 0.9 % (FLUSH) 0.9 %
10 SYRINGE (ML) INJECTION EVERY 12 HOURS PRN
Status: DISCONTINUED | OUTPATIENT
Start: 2022-10-17 | End: 2022-10-18 | Stop reason: HOSPADM

## 2022-10-17 RX ORDER — ALBUTEROL SULFATE 2.5 MG/.5ML
2.5 SOLUTION RESPIRATORY (INHALATION) EVERY 4 HOURS
Status: DISCONTINUED | OUTPATIENT
Start: 2022-10-17 | End: 2022-10-17

## 2022-10-17 RX ORDER — IPRATROPIUM BROMIDE AND ALBUTEROL SULFATE 2.5; .5 MG/3ML; MG/3ML
3 SOLUTION RESPIRATORY (INHALATION) EVERY 4 HOURS PRN
Status: DISCONTINUED | OUTPATIENT
Start: 2022-10-17 | End: 2022-10-18 | Stop reason: HOSPADM

## 2022-10-17 RX ORDER — SODIUM CHLORIDE 0.9 % (FLUSH) 0.9 %
10 SYRINGE (ML) INJECTION
Status: DISCONTINUED | OUTPATIENT
Start: 2022-10-17 | End: 2022-10-18 | Stop reason: HOSPADM

## 2022-10-17 RX ORDER — TALC
6 POWDER (GRAM) TOPICAL NIGHTLY PRN
Status: DISCONTINUED | OUTPATIENT
Start: 2022-10-18 | End: 2022-10-18 | Stop reason: HOSPADM

## 2022-10-17 RX ORDER — DEXAMETHASONE SODIUM PHOSPHATE 4 MG/ML
4 INJECTION, SOLUTION INTRA-ARTICULAR; INTRALESIONAL; INTRAMUSCULAR; INTRAVENOUS; SOFT TISSUE DAILY
Status: DISCONTINUED | OUTPATIENT
Start: 2022-10-18 | End: 2022-10-18 | Stop reason: HOSPADM

## 2022-10-17 RX ORDER — SODIUM CHLORIDE 9 MG/ML
INJECTION, SOLUTION INTRAVENOUS
Status: COMPLETED | OUTPATIENT
Start: 2022-10-17 | End: 2022-10-17

## 2022-10-17 RX ORDER — GABAPENTIN 100 MG/1
100 CAPSULE ORAL 3 TIMES DAILY
Status: DISCONTINUED | OUTPATIENT
Start: 2022-10-17 | End: 2022-10-18 | Stop reason: HOSPADM

## 2022-10-17 RX ORDER — IPRATROPIUM BROMIDE AND ALBUTEROL SULFATE 2.5; .5 MG/3ML; MG/3ML
3 SOLUTION RESPIRATORY (INHALATION) ONCE
Status: COMPLETED | OUTPATIENT
Start: 2022-10-17 | End: 2022-10-17

## 2022-10-17 RX ORDER — ACETAMINOPHEN 325 MG/1
650 TABLET ORAL EVERY 6 HOURS PRN
Status: DISCONTINUED | OUTPATIENT
Start: 2022-10-18 | End: 2022-10-18 | Stop reason: HOSPADM

## 2022-10-17 RX ORDER — DEXAMETHASONE SODIUM PHOSPHATE 4 MG/ML
6 INJECTION, SOLUTION INTRA-ARTICULAR; INTRALESIONAL; INTRAMUSCULAR; INTRAVENOUS; SOFT TISSUE
Status: COMPLETED | OUTPATIENT
Start: 2022-10-17 | End: 2022-10-17

## 2022-10-17 RX ORDER — SODIUM CHLORIDE 9 MG/ML
INJECTION, SOLUTION INTRAVENOUS CONTINUOUS
Status: DISCONTINUED | OUTPATIENT
Start: 2022-10-17 | End: 2022-10-18

## 2022-10-17 RX ORDER — METHYLPREDNISOLONE SOD SUCC 125 MG
125 VIAL (EA) INJECTION EVERY 6 HOURS
Status: COMPLETED | OUTPATIENT
Start: 2022-10-17 | End: 2022-10-18

## 2022-10-17 RX ORDER — IPRATROPIUM BROMIDE AND ALBUTEROL SULFATE 2.5; .5 MG/3ML; MG/3ML
3 SOLUTION RESPIRATORY (INHALATION)
Status: COMPLETED | OUTPATIENT
Start: 2022-10-17 | End: 2022-10-17

## 2022-10-17 RX ORDER — LIDOCAINE 50 MG/G
1 PATCH TOPICAL
Status: DISCONTINUED | OUTPATIENT
Start: 2022-10-17 | End: 2022-10-18 | Stop reason: HOSPADM

## 2022-10-17 RX ADMIN — METHYLPREDNISOLONE SODIUM SUCCINATE 125 MG: 125 INJECTION, POWDER, FOR SOLUTION INTRAMUSCULAR; INTRAVENOUS at 11:10

## 2022-10-17 RX ADMIN — IPRATROPIUM BROMIDE AND ALBUTEROL SULFATE 3 ML: 2.5; .5 SOLUTION RESPIRATORY (INHALATION) at 01:10

## 2022-10-17 RX ADMIN — DEXAMETHASONE SODIUM PHOSPHATE 6 MG: 4 INJECTION, SOLUTION INTRA-ARTICULAR; INTRALESIONAL; INTRAMUSCULAR; INTRAVENOUS; SOFT TISSUE at 12:10

## 2022-10-17 RX ADMIN — ALBUTEROL SULFATE 2.5 MG: 2.5 SOLUTION RESPIRATORY (INHALATION) at 08:10

## 2022-10-17 RX ADMIN — METHYLPREDNISOLONE SODIUM SUCCINATE 125 MG: 125 INJECTION, POWDER, FOR SOLUTION INTRAMUSCULAR; INTRAVENOUS at 05:10

## 2022-10-17 RX ADMIN — IOHEXOL 75 ML: 350 INJECTION, SOLUTION INTRAVENOUS at 09:10

## 2022-10-17 RX ADMIN — IPRATROPIUM BROMIDE AND ALBUTEROL SULFATE 3 ML: 2.5; .5 SOLUTION RESPIRATORY (INHALATION) at 03:10

## 2022-10-17 RX ADMIN — LIDOCAINE 1 PATCH: 50 PATCH TOPICAL at 05:10

## 2022-10-17 RX ADMIN — SODIUM CHLORIDE: 0.9 INJECTION, SOLUTION INTRAVENOUS at 08:10

## 2022-10-17 RX ADMIN — SODIUM CHLORIDE: 0.9 INJECTION, SOLUTION INTRAVENOUS at 02:10

## 2022-10-17 RX ADMIN — METHYLPREDNISOLONE SODIUM SUCCINATE 125 MG: 125 INJECTION, POWDER, FOR SOLUTION INTRAMUSCULAR; INTRAVENOUS at 12:10

## 2022-10-17 RX ADMIN — GABAPENTIN 100 MG: 100 CAPSULE ORAL at 08:10

## 2022-10-17 RX ADMIN — ALBUTEROL SULFATE 2.5 MG: 2.5 SOLUTION RESPIRATORY (INHALATION) at 02:10

## 2022-10-17 NOTE — NURSING
I was notified by another nurse that her HR was 170 on the monitor. I walked into the pt's room to check on her and found pt standing at the sink washing off. I asked how she was feeling and she stated she was feeling a bit winded. I had the pt return to the bed and continued to monitor her HR on the monitor. Her HR dropped down to around 115 the longer she rested. Pt's O2 remained 97-98% the whole time on RA. Dr. Murillo notified. A stat CTA was ordered. CT called to say the 22g in her hand would not work and that they needed at least a 20g in the FA or higher. Myself and another nurse tried 5 times to get a new PIV but we were unsuccessful. I double checked with CT to make sure they could not use a @@g and was told they could not. House Sup notified of unsuccessful attempts. He stated he would come try. I will notify CT if/when a new PIV is secured.

## 2022-10-17 NOTE — ED PROVIDER NOTES
"SCRIBE #1 NOTE: I, CHELSEA CARTAGENA, am scribing for, and in the presence of,  Hannah Siegel MD. I have scribed the following portions of the note - Other sections scribed: HPI, ROS, PE.         EM PHYSICIAN NOTE       This patient presents with a complaint of chemical exposure.   Chief Complaint   Patient presents with    Chemical Exposure     PT accidentally mixed ammonia and bleach together while cleaning 1 hour prior to arrival. PT currently coughing and with burning in throat and lungs.       HPI: 37-year-old female patient with no pertinent past medical history, presents to the ED with a chief complaint of chemical exposure onset one hour ago. The patient states that she accidentally mixed and inhaled ammonia and bleach cleaning products at her house, causing her to have symptoms of cough and "burning" pain with taking a deep breath. No other exacerbating or alleviating factors.  Patient has had an endometrial ablation and no longer so has menstrual cycles.  She does not have a history of asthma.    REVIEW of PMH, SOC History and Family History:  No past medical history on file.  Social history noncontributory  Family history noncontributory    Review of patient's allergies indicates:   Allergen Reactions    Amoxicillin Hives           REVIEW of SYSTEMS  Source: The patient   The nurse's notes and triage vital signs were reviewed.  GENERAL/CONSTITUTIONAL: There is no report of fever, fatigue, weakness, or unexplained weight loss.  CARDIOVASCULAR: There is no report of chest pain   RESPIRATORY: SEE HPI.   GASTROINTESTINAL: There is no report of nausea, vomiting, diarrhea  MUSCULOSKELETAL: There is no report of joint or muscle pain. No muscle weakness or tenderness.  SKIN AND BREASTS: There is no report of easy bruising, skin redness, skin rash.  HEMATOLOGIC/LYMPHATIC: There is no report of anemia, bleeding or clotting defects. There is no report of anticoagulant use.  The remainder of the ROS is " "negative.        PHYSICAL EXAMINATION    ED Triage Vitals [10/16/22 2240]   Enc Vitals Group      /71      Pulse 104      Resp (!) 25      Temp 98.6 °F (37 °C)      Temp src Oral      SpO2 100 %      Weight 250 lb      Height 5' 9"      Head Circumference       Peak Flow       Pain Score       Pain Loc       Pain Edu?       Excl. in GC?      Vital signs and Pulse Ox reviewed in clinical context. Abnormalities noted: none:  Heart rate, blood pressure, temperature, respiratory rate and pulse ox are wnl  Pt's level of consciousness is alert, and the patient is in mild distress.  Skin: warm, pink and dry.  Capillary refill is less than 2 seconds.  Mucosa:moist  Head and Neck: WNL  Cardiac exam: RRR  Pulmonary exam: Wheezes in bilateral lung fields.  Abd Exam: soft nontender   Musculoskeletal: no joint tenderness, deformity or swelling   Neurologic: GCS: GCS 15; 5 over 5 strength, cranial nerves intact, neck supple       Initial Impression:  Chemical induced bronchospasm rule out pneumonitis  Plan:  DuoNeb, chest x-ray, supplementary humidified oxygen, Decadron and reassess  Hannah Siegel MD, 10:52 PM 10/16/2022      Medical decision making:   Nurses notes and Vital Signs reviewed.  Orders Placed This Encounter   Procedures    X-Ray Chest AP Portable    CBC Auto Differential    Comprehensive Metabolic Panel    Oxygen Continuous    Inhalation Treatment Once    Inhalation Treatment Once    Pulse Oximetry Continuous    POCT urine pregnancy    Saline lock IV    Possible Hospitalization       ED Course as of 10/17/22 0157   Mon Oct 17, 2022   0014 CXR wnl [MH]   0014 Clear on inspiratory effort but harsh expiratory wheezing with frequent coughing [MH]   0121 Continues to have expiratory wheezing.  O2 sats or 100% on supplementary nasal cannula. [MH]   0126 Tox review of the literature reveals that treatment for chloramine lung irritation includes humidified oxygen, beta agonist inhaled, and steroids. If symptoms " last greater than 6 hour patient be should be admitted for observation. [MH]   0150 Patient with continued harsh coughing and prolonged expiratory phase.  On room air, patient becomes uncomfortable and sats dropped to 92% [MH]   0151 Will place in observation for continued DuoNebs, Decadron and humidified O2 [MH]      ED Course User Index  [MH] Hannah Siegel MD       MDM  Number of Diagnoses or Management Options  Bronchitis and pneumonitis due to chemical fumes: new, needed workup  Bronchospasm: new, needed workup     Amount and/or Complexity of Data Reviewed  Tests in the radiology section of CPT®: ordered and reviewed    Risk of Complications, Morbidity, and/or Mortality  Presenting problems: moderate  Diagnostic procedures: low  Management options: moderate    Critical Care  Total time providing critical care: 30-74 minutes    Patient Progress  Patient progress: stable       Diagnoses that have been ruled out:   None   Diagnoses that are still under consideration:   None   Final diagnoses:   Bronchospasm   Bronchitis and pneumonitis due to chemical fumes            Follow-up Information    None       ED Prescriptions    None         This note was created using Dictation Software.  This program may occasionally misinterpret certain words and phrases.      SCRIBE ATTESTATION NOTE:   I attest that I personally performed the services documented by the scribe and acknowledged and confirm the content of the note.   Nurses notes were reviewed.  Hannah Siegel      Nurses notes were reviewed.      CRITICAL CARE TIME:  These services included the following: chart data review, reviewing nursing notes and researching old charts from internal and external sources, documentation time, consultant collaboration regarding findings and treatment options, medication orders and management, direct patient care, vital sign assessments, physical exam reassessments, and ordering, interpreting and reviewing diagnostic  studies/lab tests.    Aggregate critical care time was approximately 35 minutes, which includes only time during which I was engaged in work directly related to the patient's care, as described above, whether at the bedside or elsewhere in the Emergency Department.  It did not include time spent performing other reported procedures or the services of residents, students, nurses or physician assistants.        Transfer of Care: 1:57 AM  The medical management of Aniket Ac has been transferred to the admitting team.  At this time, the patient's condition is unchanged, and this was relayed to the accepting team.  Please see notes from the admitting team for continued care.  Hannah Siegel 1:57 AM                               Hannah Siegel MD  10/17/22 0125       Hannah Siegel MD  10/17/22 0129       Hannah Siegel MD  10/17/22 0157

## 2022-10-17 NOTE — PLAN OF CARE
[facility] - Telemetry  Discharge Assessment    Primary Care Provider: Primary Doctor No     Discharge Assessment (most recent)       BRIEF DISCHARGE ASSESSMENT - 10/17/22 1426          Discharge Planning    Assessment Type Discharge Planning Assessment     Resource/Environmental Concerns none     Support Systems Spouse/significant other     Equipment Currently Used at Home none     Current Living Arrangements home/apartment/condo     Patient/Family Anticipates Transition to home with family     Patient/Family Anticipated Services at Transition none     DME Needed Upon Discharge  none     Discharge Plan A Home with family     Discharge Plan B Home with family                   Patient stated she lives with her spouse. She stated he would be her help at home, and spouse will provide transportation at discharge.

## 2022-10-17 NOTE — H&P
"[facility]  [facility] Medicine  History & Physical    Patient Name: Aniket Ac  MRN: [id number]  Patient Class: OP- Observation  Admission Date: 10/16/2022  Attending Physician: Jamari Garrison MD   Primary Care Provider: Primary Doctor No         Patient information was obtained from patient, past medical records and ER records.     Subjective:     Principal Problem:Acute bronchospasm    Chief Complaint:   Chief Complaint   Patient presents with    Chemical Exposure     PT accidentally mixed ammonia and bleach together while cleaning 1 hour prior to arrival. PT currently coughing and with burning in throat and lungs.        HPI: 37 year old female present to ED with a complaint of chemical exposure onset one hour ago. Reports accidentally mixed and inhaled ammonia and bleach cleaning products at her house. She started having symptoms of cough and "burning" pain with taking a deep breath. No other exacerbating or alleviating factors. No PMHx               No past medical history on file.    No past surgical history on file.    Review of patient's allergies indicates:   Allergen Reactions    Amoxicillin Hives       No current facility-administered medications on file prior to encounter.     No current outpatient medications on file prior to encounter.     Family History    None       Tobacco Use    Smoking status: Not on file    Smokeless tobacco: Not on file   Substance and Sexual Activity    Alcohol use: Not on file    Drug use: Not on file    Sexual activity: Not on file     Review of Systems   HENT:          Throat burning    Respiratory:  Positive for cough.    All other systems reviewed and are negative.  Objective:     Vital Signs (Most Recent):  Temp: 98.6 °F (37 °C) (10/16/22 2240)  Pulse: 91 (10/17/22 0133)  Resp: (!) 21 (10/17/22 0133)  BP: 120/73 (10/17/22 0133)  SpO2: 99 % (10/17/22 0133)   Vital Signs (24h Range):  Temp:  [98.6 °F (37 °C)] 98.6 °F (37 °C)  Pulse:  [] " 91  Resp:  [18-25] 21  SpO2:  [98 %-100 %] 99 %  BP: (117-127)/(70-78) 120/73     Weight: 113.4 kg (250 lb)  Body mass index is 36.92 kg/m².    Physical Exam  Constitutional:       Appearance: Normal appearance.   HENT:      Head: Normocephalic and atraumatic.      Mouth/Throat:      Mouth: Mucous membranes are moist.   Eyes:      Extraocular Movements: Extraocular movements intact.      Pupils: Pupils are equal, round, and reactive to light.   Cardiovascular:      Rate and Rhythm: Normal rate and regular rhythm.   Pulmonary:      Breath sounds: Wheezing present.      Comments: Cough    Abdominal:      General: Abdomen is flat.      Palpations: Abdomen is soft.   Musculoskeletal:         General: Normal range of motion.      Cervical back: Normal range of motion.   Skin:     General: Skin is warm and dry.   Neurological:      Mental Status: She is alert and oriented to person, place, and time. Mental status is at baseline.         CRANIAL NERVES     CN III, IV, VI   Pupils are equal, round, and reactive to light.     Significant Labs: All pertinent labs within the past 24 hours have been reviewed.    Significant Imaging: I have reviewed all pertinent imaging results/findings within the past 24 hours.    Assessment/Plan:     * Acute bronchospasm  Chemical induced acute bronchospasm  -steroids  -respiratory tx  -oxygen as needed           VTE Risk Mitigation (From admission, onward)         Ordered     Reason for No Pharmacological VTE Prophylaxis  Once        Question:  Reasons:  Answer:  Patient is Ambulatory    10/17/22 0307     IP VTE HIGH RISK PATIENT  Once         10/17/22 0307     Place sequential compression device  Until discontinued         10/17/22 0307               As clarification, on 10/17/22 @0335, patient should be placed in hospital observation services under my care in collaboration with MD Gayle Moreira NP  Department of Hospital Medicine   [facility] - Emergency Dept

## 2022-10-17 NOTE — SUBJECTIVE & OBJECTIVE
No past medical history on file.    No past surgical history on file.    Review of patient's allergies indicates:   Allergen Reactions    Amoxicillin Hives       No current facility-administered medications on file prior to encounter.     No current outpatient medications on file prior to encounter.     Family History    None       Tobacco Use    Smoking status: Not on file    Smokeless tobacco: Not on file   Substance and Sexual Activity    Alcohol use: Not on file    Drug use: Not on file    Sexual activity: Not on file     Review of Systems   HENT:          Throat burning    Respiratory:  Positive for cough.    All other systems reviewed and are negative.  Objective:     Vital Signs (Most Recent):  Temp: 98.6 °F (37 °C) (10/16/22 2240)  Pulse: 91 (10/17/22 0133)  Resp: (!) 21 (10/17/22 0133)  BP: 120/73 (10/17/22 0133)  SpO2: 99 % (10/17/22 0133)   Vital Signs (24h Range):  Temp:  [98.6 °F (37 °C)] 98.6 °F (37 °C)  Pulse:  [] 91  Resp:  [18-25] 21  SpO2:  [98 %-100 %] 99 %  BP: (117-127)/(70-78) 120/73     Weight: 113.4 kg (250 lb)  Body mass index is 36.92 kg/m².    Physical Exam  Constitutional:       Appearance: Normal appearance.   HENT:      Head: Normocephalic and atraumatic.      Mouth/Throat:      Mouth: Mucous membranes are moist.   Eyes:      Extraocular Movements: Extraocular movements intact.      Pupils: Pupils are equal, round, and reactive to light.   Cardiovascular:      Rate and Rhythm: Normal rate and regular rhythm.   Pulmonary:      Breath sounds: Wheezing present.      Comments: Cough    Abdominal:      General: Abdomen is flat.      Palpations: Abdomen is soft.   Musculoskeletal:         General: Normal range of motion.      Cervical back: Normal range of motion.   Skin:     General: Skin is warm and dry.   Neurological:      Mental Status: She is alert and oriented to person, place, and time. Mental status is at baseline.         CRANIAL NERVES     CN III, IV, VI   Pupils are equal,  round, and reactive to light.     Significant Labs: All pertinent labs within the past 24 hours have been reviewed.    Significant Imaging: I have reviewed all pertinent imaging results/findings within the past 24 hours.

## 2022-10-17 NOTE — ED TRIAGE NOTES
Pt BIB  with C/O burning in throat and lungs x1hr ago. Pt mixed ammonia and bleach and inhaled the chemical while spraying it on appliances. Pt experiences midsternal burning and audible wheezing noted. Denies Hx of respiratory illness.  Pt is AAOx4, NAD, tachypneic and labored breathing.

## 2022-10-17 NOTE — ED NOTES
Received report from REESE Mercer. Pt in NAD, AAOx4, VSS. Bed rails up x2, wheels locked, call light in reach. Placed on cardiac monitoring, pulse ox, and BP cuff. Pt requesting food tray and notified that breakfast is passed out around 0800.

## 2022-10-17 NOTE — HPI
"37 year old female present to ED with a complaint of chemical exposure onset one hour ago. Reports accidentally mixed and inhaled ammonia and bleach cleaning products at her house. She started having symptoms of cough and "burning" pain with taking a deep breath. No other exacerbating or alleviating factors. No PMHx           "

## 2022-10-17 NOTE — NURSING
Pt arrived to the floor from the ED. Report taken from REESE Maradiaga. Pt able to ambulate from stretcher to bed without assist. Pt connected to wall O2 and telemetry box placed. Verified placement with tele tech. VSS. O2 96% on 3L. Pt staes she only has pain in her chest when she coughs and explains the cough is infrequent. Bed locked in lowest position, call light in reach. All belongings at the bedside. NAD noted.

## 2022-10-18 VITALS
WEIGHT: 266.13 LBS | BODY MASS INDEX: 39.42 KG/M2 | RESPIRATION RATE: 18 BRPM | HEIGHT: 69 IN | TEMPERATURE: 98 F | HEART RATE: 89 BPM | OXYGEN SATURATION: 96 % | DIASTOLIC BLOOD PRESSURE: 59 MMHG | SYSTOLIC BLOOD PRESSURE: 113 MMHG

## 2022-10-18 PROCEDURE — 25000003 PHARM REV CODE 250: Performed by: NURSE PRACTITIONER

## 2022-10-18 PROCEDURE — 96375 TX/PRO/DX INJ NEW DRUG ADDON: CPT

## 2022-10-18 PROCEDURE — 96361 HYDRATE IV INFUSION ADD-ON: CPT

## 2022-10-18 PROCEDURE — 25000003 PHARM REV CODE 250: Performed by: EMERGENCY MEDICINE

## 2022-10-18 PROCEDURE — 96376 TX/PRO/DX INJ SAME DRUG ADON: CPT

## 2022-10-18 PROCEDURE — 63600175 PHARM REV CODE 636 W HCPCS: Performed by: NURSE PRACTITIONER

## 2022-10-18 PROCEDURE — G0378 HOSPITAL OBSERVATION PER HR: HCPCS

## 2022-10-18 PROCEDURE — 94640 AIRWAY INHALATION TREATMENT: CPT

## 2022-10-18 PROCEDURE — 25000242 PHARM REV CODE 250 ALT 637 W/ HCPCS: Performed by: INTERNAL MEDICINE

## 2022-10-18 PROCEDURE — 63600175 PHARM REV CODE 636 W HCPCS: Performed by: EMERGENCY MEDICINE

## 2022-10-18 PROCEDURE — 94760 N-INVAS EAR/PLS OXIMETRY 1: CPT

## 2022-10-18 PROCEDURE — 27000221 HC OXYGEN, UP TO 24 HOURS

## 2022-10-18 RX ORDER — ALBUTEROL SULFATE 90 UG/1
2 AEROSOL, METERED RESPIRATORY (INHALATION) EVERY 6 HOURS PRN
Qty: 8.5 G | Refills: 0 | Status: SHIPPED | OUTPATIENT
Start: 2022-10-18 | End: 2023-10-18

## 2022-10-18 RX ORDER — PREDNISONE 20 MG/1
40 TABLET ORAL DAILY
Qty: 10 TABLET | Refills: 0 | Status: SHIPPED | OUTPATIENT
Start: 2022-10-18 | End: 2022-10-23

## 2022-10-18 RX ADMIN — SODIUM CHLORIDE: 0.9 INJECTION, SOLUTION INTRAVENOUS at 06:10

## 2022-10-18 RX ADMIN — ALBUTEROL SULFATE 2.5 MG: 2.5 SOLUTION RESPIRATORY (INHALATION) at 08:10

## 2022-10-18 RX ADMIN — ACETAMINOPHEN 650 MG: 325 TABLET ORAL at 12:10

## 2022-10-18 RX ADMIN — GABAPENTIN 100 MG: 100 CAPSULE ORAL at 08:10

## 2022-10-18 RX ADMIN — DEXAMETHASONE SODIUM PHOSPHATE 4 MG: 4 INJECTION INTRA-ARTICULAR; INTRALESIONAL; INTRAMUSCULAR; INTRAVENOUS; SOFT TISSUE at 08:10

## 2022-10-18 RX ADMIN — METHYLPREDNISOLONE SODIUM SUCCINATE 125 MG: 125 INJECTION, POWDER, FOR SOLUTION INTRAMUSCULAR; INTRAVENOUS at 05:10

## 2022-10-18 RX ADMIN — Medication 6 MG: at 12:10

## 2022-10-18 NOTE — PLAN OF CARE
[facility] - Telemetry  Discharge Final Note    Primary Care Provider: Primary Doctor No    Expected Discharge Date: 10/18/2022    Final Discharge Note (most recent)       Final Note - 10/18/22 1012          Final Note    Assessment Type Final Discharge Note     Anticipated Discharge Disposition Home or Self Care     What phone number can be called within the next 1-3 days to see how you are doing after discharge? [phone]     Hospital Resources/Appts/Education Provided Appointments scheduled and added to AVS;Appointments scheduled by Navigator/Coordinator        Post-Acute Status    Discharge Delays None known at this time                     Important Message from Medicare             Contact Info       [facility] [place]    [address] [address]   Phone: [phone]       Next Steps: Schedule an appointment as soon as possible for a visit          JUSTIN spoke with nurse Santiago that patient cleared from case management standpoint.

## 2022-10-18 NOTE — NURSING
Discharge instructions explained to the pt. All questions answered. PIV and telemetry removed. Discharge medications delivered tot he bedside by [facility]. Pt aware to make follow up appt with PCP. Pt stable and cooperative. All belongings in pt's possession. Pt discharge off floor pending arrival of mother for .

## 2022-10-18 NOTE — HOSPITAL COURSE
Admitted to observation for chemical induced bronchospasm. IV steroid and duoneb started. Tachycardia when up ambulating. CTA chest no PE but does small nonspecific focal GGO at mid left lung apex and mod bilateral scarring or atelectasis. Remains afebrile and without leukocytosis. Tolerated meals. This am, VS wnl. Lung clear and oxygenating well on ZM12-826% on RA. Instruct patient to follow up PCP at [facility]

## 2022-10-18 NOTE — DISCHARGE SUMMARY
"[facility] Medicine  Discharge Summary      Patient Name: Aniket Ac  MRN: [id number]  Patient Class: OP- Observation  Admission Date: 10/16/2022  Hospital Length of Stay: 0 days  Discharge Date and Time: 10/18/2022  Attending Physician: Cata Page MD  Discharging Provider: Amanda Catalan NP  Primary Care Provider: Primary Doctor No      HPI:   37 year old female present to ED with a complaint of chemical exposure onset one hour ago. Reports accidentally mixed and inhaled ammonia and bleach cleaning products at her house. She started having symptoms of cough and "burning" pain with taking a deep breath. No other exacerbating or alleviating factors. No PMHx               * No surgery found *      Hospital Course:   Admitted to observation for chemical induced bronchospasm. IV steroid and duoneb started. Tachycardia when up ambulating. CTA chest no PE but does small nonspecific focal GGO at mid left lung apex and mod bilateral scarring or atelectasis. Remains afebrile and without leukocytosis. Tolerated meals. This am, VS wnl. Lung clear and oxygenating well on BG43-602% on RA. Instruct patient to follow up PCP at [facility]        Goals of Care Treatment Preferences:  Code Status: Full Code      Consults:     No new Assessment & Plan notes have been filed under this hospital service since the last note was generated.  Service: Hospital Medicine    Final Active Diagnoses:    Diagnosis Date Noted POA    PRINCIPAL PROBLEM:  Acute bronchospasm [J98.01] 10/17/2022 Unknown      Problems Resolved During this Admission:       Discharged Condition: stable    Disposition: Home or Self Care    Follow Up:   Follow-up Information     [facility] Aubree Noriega. Schedule an appointment as soon as possible for a visit.    Contact information:  [address] [place] [id number] [phone]                       Patient Instructions:      Diet Cardiac     Notify your health care provider " if you experience any of the following:  temperature >100.4     Notify your health care provider if you experience any of the following:  redness, tenderness, or signs of infection (pain, swelling, redness, odor or green/yellow discharge around incision site)     Notify your health care provider if you experience any of the following:  increased confusion or weakness     Activity as tolerated       Significant Diagnostic Studies: Labs: All labs within the past 24 hours have been reviewed    Pending Diagnostic Studies:     None         Medications:  Reconciled Home Medications:      Medication List      START taking these medications    albuterol 90 mcg/actuation inhaler  Commonly known as: PROVENTIL HFA  Inhale 2 puffs into the lungs every 6 (six) hours as needed for Wheezing. Rescue     predniSONE 20 MG tablet  Commonly known as: DELTASONE  Take 2 tablets (40 mg total) by mouth once daily. for 5 days            Indwelling Lines/Drains at time of discharge:   Lines/Drains/Airways     None                 Time spent on the discharge of patient: 30 minutes         Amanda Catalan NP  Department of [facility] [place]  [facility]

## 2023-03-20 ENCOUNTER — HOSPITAL ENCOUNTER (EMERGENCY)
Facility: HOSPITAL | Age: 38
Discharge: HOME | End: 2023-03-20
Attending: EMERGENCY MEDICINE
Payer: MEDICAID

## 2023-03-20 VITALS
WEIGHT: 250 LBS | BODY MASS INDEX: 36.92 KG/M2 | TEMPERATURE: 98 F | HEART RATE: 87 BPM | SYSTOLIC BLOOD PRESSURE: 116 MMHG | DIASTOLIC BLOOD PRESSURE: 79 MMHG | RESPIRATION RATE: 18 BRPM | OXYGEN SATURATION: 100 %

## 2023-03-20 DIAGNOSIS — R06.02 SHORTNESS OF BREATH: ICD-10-CM

## 2023-03-20 DIAGNOSIS — U07.1 COVID-19 VIRUS DETECTED: ICD-10-CM

## 2023-03-20 DIAGNOSIS — U07.1 COVID-19: Primary | ICD-10-CM

## 2023-03-20 PROBLEM — R60.9 EDEMA: Status: ACTIVE | Noted: 2020-10-16

## 2023-03-20 PROBLEM — K21.9 GASTROESOPHAGEAL REFLUX DISEASE WITHOUT ESOPHAGITIS: Status: ACTIVE | Noted: 2020-10-16

## 2023-03-20 PROBLEM — E87.6 HYPOKALEMIA: Status: ACTIVE | Noted: 2021-04-29

## 2023-03-20 PROBLEM — G62.9 POLYNEUROPATHY: Status: ACTIVE | Noted: 2021-04-29

## 2023-03-20 PROBLEM — M17.11 ARTHROPATHY OF RIGHT KNEE: Status: ACTIVE | Noted: 2021-05-13

## 2023-03-20 PROBLEM — E55.9 VITAMIN D DEFICIENCY: Status: ACTIVE | Noted: 2022-04-04

## 2023-03-20 PROBLEM — R20.2 TINGLING OF SKIN: Status: ACTIVE | Noted: 2021-04-06

## 2023-03-20 LAB
BNP SERPL-MCNC: <10 PG/ML (ref 0–99)
BUN SERPL-MCNC: 10 MG/DL (ref 6–30)
CHLORIDE SERPL-SCNC: 104 MMOL/L (ref 95–110)
CREAT SERPL-MCNC: 0.9 MG/DL (ref 0.5–1.4)
GLUCOSE SERPL-MCNC: 85 MG/DL (ref 70–110)
HCT VFR BLD CALC: 44 %PCV (ref 36–54)
HCV AB SERPL QL IA: NORMAL
HIV 1+2 AB+HIV1 P24 AG SERPL QL IA: NORMAL
POC IONIZED CALCIUM: 1.06 MMOL/L (ref 1.06–1.42)
POC TCO2 (MEASURED): 26 MMOL/L (ref 23–29)
POTASSIUM BLD-SCNC: 5 MMOL/L (ref 3.5–5.1)
SAMPLE: NORMAL
SARS-COV-2 RDRP RESP QL NAA+PROBE: POSITIVE
SODIUM BLD-SCNC: 137 MMOL/L (ref 136–145)
TROPONIN I SERPL DL<=0.01 NG/ML-MCNC: <0.006 NG/ML (ref 0–0.03)

## 2023-03-20 PROCEDURE — 99284 EMERGENCY DEPT VISIT MOD MDM: CPT | Mod: CR,,, | Performed by: PHYSICIAN ASSISTANT

## 2023-03-20 PROCEDURE — 96375 TX/PRO/DX INJ NEW DRUG ADDON: CPT

## 2023-03-20 PROCEDURE — 93010 EKG 12-LEAD: ICD-10-PCS | Mod: ,,, | Performed by: INTERNAL MEDICINE

## 2023-03-20 PROCEDURE — 63600175 PHARM REV CODE 636 W HCPCS: Performed by: PHYSICIAN ASSISTANT

## 2023-03-20 PROCEDURE — 87389 HIV-1 AG W/HIV-1&-2 AB AG IA: CPT | Performed by: PHYSICIAN ASSISTANT

## 2023-03-20 PROCEDURE — 99285 EMERGENCY DEPT VISIT HI MDM: CPT | Mod: 25

## 2023-03-20 PROCEDURE — 84484 ASSAY OF TROPONIN QUANT: CPT | Performed by: PHYSICIAN ASSISTANT

## 2023-03-20 PROCEDURE — 86803 HEPATITIS C AB TEST: CPT | Performed by: PHYSICIAN ASSISTANT

## 2023-03-20 PROCEDURE — U0002 COVID-19 LAB TEST NON-CDC: HCPCS | Performed by: PHYSICIAN ASSISTANT

## 2023-03-20 PROCEDURE — 96374 THER/PROPH/DIAG INJ IV PUSH: CPT

## 2023-03-20 PROCEDURE — 25000003 PHARM REV CODE 250: Performed by: PHYSICIAN ASSISTANT

## 2023-03-20 PROCEDURE — 93010 ELECTROCARDIOGRAM REPORT: CPT | Mod: ,,, | Performed by: INTERNAL MEDICINE

## 2023-03-20 PROCEDURE — 99284 PR EMERGENCY DEPT VISIT,LEVEL IV: ICD-10-PCS | Mod: CR,,, | Performed by: PHYSICIAN ASSISTANT

## 2023-03-20 PROCEDURE — 83880 ASSAY OF NATRIURETIC PEPTIDE: CPT | Performed by: PHYSICIAN ASSISTANT

## 2023-03-20 PROCEDURE — 80047 BASIC METABLC PNL IONIZED CA: CPT

## 2023-03-20 PROCEDURE — 93005 ELECTROCARDIOGRAM TRACING: CPT

## 2023-03-20 RX ORDER — ONDANSETRON 4 MG/1
4 TABLET, ORALLY DISINTEGRATING ORAL EVERY 6 HOURS PRN
Qty: 15 TABLET | Refills: 0 | Status: SHIPPED | OUTPATIENT
Start: 2023-03-20

## 2023-03-20 RX ORDER — OXYMETAZOLINE HCL 0.05 %
1 SPRAY, NON-AEROSOL (ML) NASAL
Status: COMPLETED | OUTPATIENT
Start: 2023-03-20 | End: 2023-03-20

## 2023-03-20 RX ORDER — KETOROLAC TROMETHAMINE 30 MG/ML
10 INJECTION, SOLUTION INTRAMUSCULAR; INTRAVENOUS
Status: COMPLETED | OUTPATIENT
Start: 2023-03-20 | End: 2023-03-20

## 2023-03-20 RX ORDER — NAPROXEN 500 MG/1
500 TABLET ORAL 2 TIMES DAILY PRN
Qty: 20 TABLET | Refills: 0 | Status: SHIPPED | OUTPATIENT
Start: 2023-03-20

## 2023-03-20 RX ORDER — BENZONATATE 100 MG/1
100 CAPSULE ORAL 3 TIMES DAILY PRN
Qty: 20 CAPSULE | Refills: 0 | Status: SHIPPED | OUTPATIENT
Start: 2023-03-20 | End: 2023-03-30

## 2023-03-20 RX ORDER — ONDANSETRON 2 MG/ML
4 INJECTION INTRAMUSCULAR; INTRAVENOUS
Status: COMPLETED | OUTPATIENT
Start: 2023-03-20 | End: 2023-03-20

## 2023-03-20 RX ORDER — BENZONATATE 100 MG/1
100 CAPSULE ORAL
Status: COMPLETED | OUTPATIENT
Start: 2023-03-20 | End: 2023-03-20

## 2023-03-20 RX ADMIN — ONDANSETRON 4 MG: 2 INJECTION INTRAMUSCULAR; INTRAVENOUS at 01:03

## 2023-03-20 RX ADMIN — BENZONATATE 100 MG: 100 CAPSULE ORAL at 01:03

## 2023-03-20 RX ADMIN — KETOROLAC TROMETHAMINE 10 MG: 30 INJECTION, SOLUTION INTRAMUSCULAR; INTRAVENOUS at 01:03

## 2023-03-20 RX ADMIN — OXYMETAZOLINE HCL 1 SPRAY: 0.05 SPRAY NASAL at 01:03

## 2023-03-20 NOTE — ED NOTES
"  Patient identifiers for Aniket Ac 38 y.o. female checked and correct.  Chief Complaint   Patient presents with    COVID-19 Concerns     Reports 2 positive home tests yesterday. Reports "I feel like I have the flu."     Past Medical History:   Diagnosis Date    COVID-19 virus infection 12/11/2020    Essential hypertension 12/11/2020     Allergies reported:   Review of patient's allergies indicates:   Allergen Reactions    Codeine Hives    Hydrochlorothiazide      Other reaction(s): hypokalemia    Penicillins          HEENT: Denies vision changes. Denies ear drainage or hearing loss. Denies dysphagia or voice changes. Loss of taste smell. Congestion noted.   Appearance: Pt awake, alert & oriented to person, place & time. Pt in no acute distress at present time. Pt is clean and well groomed with clothes appropriately fastened.   Skin: Skin warm, dry & intact. Color consistent with ethnicity. Mucous membranes moist. No breakdown or brusing noted.   Musculoskeletal: Patient moving all extremities well, no obvious swelling or deformities noted.   Respiratory: Report SOB w/ dry cough. Visible chest rise noted. Airway is open and patent. No accessory muscle use noted.   Neurologic: Sensation is intact. Speech is clear and appropriate. Eyes open spontaneously, behavior appropriate to situation, follows commands, facial expression symmetrical, bilateral hand grasp equal and even, purposeful motor response noted.  Cardiac: Weakness/ Fatigue noted. All peripheral pulses present. No Bilateral lower extremity edema. Cap refill is <3 seconds.  Abdomen: Abdomen soft, non distended, non tender to palpation. N/V noted.  : Pt voids independently, denies dysuria, hematuria, frequency.    "

## 2023-03-20 NOTE — Clinical Note
"Myiosha "Marlinmarcia" Osorio was seen and treated in our emergency department on 3/20/2023.  She may return to work on 03/25/2023.       If you have any questions or concerns, please don't hesitate to call.      Claudia Wilkinson PA-C"

## 2023-03-20 NOTE — DISCHARGE INSTRUCTIONS
Diagnosis: COVID-19    Your COVID-19 test is positive.  Starting today, please self isolate for 7 days.  You do not need to be retested after this.  Do not go out in public.  Please inform any close contacts that they have been exposed. I am prescribing an experimental anti virus medicine called Paxlovid which has been shown to decrease the length of illness and keep people out of the hospital. However, it can have some side effects, mostly nausea and vomiting.  It is still considered an experimental medicine.  There are no studies to see if there interactions between Paxlovid and supplements, do not take any of your over-the-counter supplements if you are taking Paxlovid.  I am prescribing medicine for cough, nausea and pain. You can take Tylenol as needed for pain up to 3 grams daily which is 6 of the 500 mg extra strength tablets.  You can take to 2400 mg daily of ibuprofen in addition to this.    Please schedule a follow-up appointment with your primary care doctor after you finish your quarantine.  If you start to have shortness of breath, chest pain, passing out or inability to hold down fluids please return to the emergency department.

## 2023-03-20 NOTE — Clinical Note
"Aniket "Turner Ac was seen and treated in our emergency department on 3/20/2023.     COVID-19 is present in our communities across the state. There is limited testing for COVID at this time, so not all patients can be tested. In this situation, your employee meets the following criteria:    Aniket Ac has met the criteria for COVID-19 testing and has a POSITIVE result. She can return to work once they are asymptomatic for 24 hours without the use of fever reducing medications AND at least five days from the first positive result. A mask is recommended for 5 days post quarantine.     If you have any questions or concerns, or if I can be of further assistance, please do not hesitate to contact me.    Sincerely,             Claudia Wilkinson PA-C"

## 2023-03-20 NOTE — ED NOTES
I-STAT Chem-8+ Results:   Value Reference Range   Sodium 137 136-145 mmol/L   Potassium  5.0 3.5-5.1 mmol/L   Chloride 104  mmol/L   Ionized Calcium 1.06 1.06-1.42 mmol/L   CO2 (measured) 26 23-29 mmol/L   Glucose 85  mg/dL   BUN 10 6-30 mg/dL   Creatinine 0.9 0.5-1.4 mg/dL   Hematocrit 44 36-54%

## 2023-03-20 NOTE — ED PROVIDER NOTES
"Encounter Date: 3/20/2023       History     Chief Complaint   Patient presents with    COVID-19 Concerns     Reports 2 positive home tests yesterday. Reports "I feel like I have the flu."     38-year-old female with history of hypertension and GERD presents for a COVID-19 test after testing positive twice at home.  She started to feel ill 2 days ago with congestion, cough, fatigue, generalized weakness, nausea and vomiting and decreased taste and smell.  Initially, she stated to me that she only feels short of breath due to the mask, however her  reports that she has been complaining of shortness of breath for over a week now prior to the onset of her other symptoms.  She notes associated orthopnea and some bilateral leg pain with an indentation on her right shin.  She denies chest pain, fever or leg swelling.  No history of DVT/PE, no recent surgeries or immobilization.  Not OCPs or other hormonal medications.    Review of patient's allergies indicates:   Allergen Reactions    Codeine Hives    Hydrochlorothiazide      Other reaction(s): hypokalemia    Penicillins      Past Medical History:   Diagnosis Date    COVID-19 virus infection 2020    Essential hypertension 2020     Past Surgical History:   Procedure Laterality Date     SECTION, CLASSIC      CHOLECYSTECTOMY      COLONOSCOPY N/A 2022    Procedure: COLONOSCOPY;  Surgeon: Tila Samuels MD;  Location: [facility];  Service: Endoscopy;  Laterality: N/A;    TUBAL LIGATION       History reviewed. No pertinent family history.  Social History     Tobacco Use    Smoking status: Never    Smokeless tobacco: Never   Substance Use Topics    Alcohol use: Yes     Comment: SOCIALLY    Drug use: No     Review of Systems   Constitutional:  Positive for fatigue. Negative for fever.   HENT:  Positive for congestion.    Respiratory:  Positive for cough. Negative for shortness of breath.    Cardiovascular:  Negative for chest pain. "   Gastrointestinal:  Positive for nausea and vomiting. Negative for abdominal pain.   Musculoskeletal:  Positive for arthralgias and myalgias.   Allergic/Immunologic: Negative for immunocompromised state.     Physical Exam     Initial Vitals [03/20/23 1228]   BP Pulse Resp Temp SpO2   113/76 88 18 98 °F (36.7 °C) 100 %      MAP       --         Physical Exam    Nursing note and vitals reviewed.  Constitutional: She appears well-developed and well-nourished.   HENT:   Head: Normocephalic and atraumatic.   Eyes: EOM are normal. Pupils are equal, round, and reactive to light.   Neck: Neck supple.   Normal range of motion.  Cardiovascular:  Normal rate, regular rhythm, normal heart sounds and intact distal pulses.     Exam reveals no gallop and no friction rub.       No murmur heard.  No lower extremity edema   Pulmonary/Chest: Breath sounds normal. No respiratory distress. She has no wheezes. She has no rhonchi. She has no rales. She exhibits no tenderness.   Musculoskeletal:         General: Normal range of motion.      Cervical back: Normal range of motion and neck supple.      Comments: There is some tenderness to palpation of the anterior shin, no induration or swelling.  No calf swelling.  Calves equal in size.  Compartments soft.     Neurological: She is alert and oriented to person, place, and time.   Skin: Skin is warm and dry.   Psychiatric: She has a normal mood and affect.       ED Course   Procedures  Labs Reviewed   SARS-COV-2 RNA AMPLIFICATION, QUAL - Abnormal; Notable for the following components:       Result Value    SARS-CoV-2 RNA, Amplification, Qual Positive (*)     All other components within normal limits    Narrative:     Is the patient symptomatic?->Yes  Is testing needed for patient travel?->No  Is this needed for pre-procedure or pre-op testing?->No   HIV 1 / 2 ANTIBODY    Narrative:     Release to patient->Immediate   HEPATITIS C ANTIBODY    Narrative:     Release to patient->Immediate    TROPONIN I   B-TYPE NATRIURETIC PEPTIDE   ISTAT PROCEDURE   ISTAT CHEM8     EKG Readings: (Independently Interpreted)   Initial Reading: No STEMI. Previous EKG: Compared with most recent EKG Previous EKG Date: 4/23/2022. Rhythm: Normal Sinus Rhythm. Heart Rate: 85. Ectopy: No Ectopy. ST Segments: Normal ST Segments. Clinical Impression: Normal Sinus Rhythm   ECG Results              EKG 12-lead (Final result)  Result time 03/20/23 14:52:11      Final result by Interface, Lab In [facility] (03/20/23 14:52:11)                   Narrative:    Test Reason : R06.02,    Vent. Rate : 085 BPM     Atrial Rate : 085 BPM     P-R Int : 174 ms          QRS Dur : 078 ms      QT Int : 348 ms       P-R-T Axes : 053 057 031 degrees     QTc Int : 414 ms    Normal sinus rhythm  Low anterior forces and R wave progression  Abnormal ECG  When compared with ECG of 23-APR-2022 22:30,  No significant change was found  Confirmed by Kishan CORNELIUS MD ([id number]) on 3/20/2023 2:51:56 PM    Referred By:             Confirmed By:Kishan CORNELIUS MD                                  Imaging Results              X-Ray Chest AP Portable (Final result)  Result time 03/20/23 13:52:10      Final result by Ahsan Murillo MD (03/20/23 13:52:10)                   Impression:      See above      Electronically signed by: Ahsan Murillo MD  Date:    03/20/2023  Time:    13:52               Narrative:    EXAMINATION:  XR CHEST AP PORTABLE    CLINICAL HISTORY:  Shortness of breath;    TECHNIQUE:  Single frontal view of the chest was performed.    COMPARISON:  No 12/11/2020 ne    FINDINGS:  Heart size normal.  The lungs are clear.  No pleural effusion .                                       Medications   benzonatate capsule 100 mg (100 mg Oral Given 3/20/23 1327)   ketorolac injection 9.999 mg (9.999 mg Intravenous Given 3/20/23 1324)   ondansetron injection 4 mg (4 mg Intravenous Given 3/20/23 1324)   oxymetazoline 0.05 % nasal spray 1 spray (1 spray Each Nostril Given 3/20/23  3800)     Medical Decision Making:   History:   Old Medical Records: I decided to obtain old medical records.  Old Records Summarized: records from previous admission(s) and records from clinic visits.       <> Summary of Records: Patient seen in April for colitis then subsequently for a coccygeal fracture  Initial Assessment:   38-year-old female presenting for positive COVID-19 test with URI symptoms.  Also reporting shortness of breath and orthopnea which preceded this.  Her vitals are normal, she appears uncomfortable but nontoxic.  Differential Diagnosis:   COVID-19  CHF  Pneumonia   Think ACS is unlikely  Independently Interpreted Test(s):   I have ordered and independently interpreted X-rays - see summary below.       <> Summary of X-Ray Reading(s): No consolidation or pulmonary edema  I have ordered and independently interpreted EKG Reading(s) - see prior notes  Clinical Tests:   Lab Tests: Ordered and Reviewed  Radiological Study: Ordered and Reviewed  Medical Tests: Ordered and Reviewed  ED Management:  Will check labs EKG chest x-ray, give medications for symptomatic relief and reassess.    COVID-19 test is positive, workup otherwise reassuring.  Patient reports improvement of symptoms.  She is able to ambulate without desaturation.  Patient offered Paxlovid treatment, she states that she would like to think about it, Rx provided.  I discussed the risks and benefits of this medication.  I did advise her to discontinue any supplement use if she is taking Paxlovid as there maybe interactions which we do not have studies for.  Patient instructed to self isolate and follow up with PCP, return to the ED for worsening symptoms. Stressed the importance of follow-up, strict ED return precautions given.  Patient voiced understanding and is comfortable with discharge.     Additional MDM:   PERC Rule:   Age is greater than or equal to 50 = 0.0  Heart Rate is greater than or equal to 100 = 0.0  SaO2 on room air < 95%  = 0.0  Unilateral leg swelling = 0.0  Hemoptysis = 0.0  Recent surgery or trauma = 0.0  Prior PE or DVT =  0.0  Hormone use = 0.00  PERC Score = 0        ED Course as of 03/20/23 1522   Mon Mar 20, 2023   1331 SpO2(S): 97 %  No desaturation with ambulation [CC]   1331 POC Hematocrit: 44 [CC]   1338 X-Ray Chest AP Portable  Per my independent interpretation, no consolidation or pulmonary edema [CC]   1432 SARS-CoV-2 RNA, Amplification, Qual(!): Positive [CC]   1436 Patient reports feeling better with therapy. [CC]   1457 Troponin I: <0.006 [CC]   1457 BNP: <10 [CC]      ED Course User Index  [CC] Claudia Wilkinson PA-C                   Clinical Impression:   Final diagnoses:  [R06.02] Shortness of breath  [U07.1] COVID-19 (Primary)        ED Disposition Condition    Discharge Stable          ED Prescriptions       Medication Sig Dispense Start Date End Date Auth. Provider    naproxen (NAPROSYN) 500 MG tablet Take 1 tablet (500 mg total) by mouth 2 (two) times daily as needed (pain). 20 tablet 3/20/2023 -- Claudia Wilkinson PA-C    ondansetron (ZOFRAN-ODT) 4 MG TbDL Take 1 tablet (4 mg total) by mouth every 6 (six) hours as needed (Nausea). 15 tablet 3/20/2023 -- Claudia Wilkinson PA-C    benzonatate (TESSALON) 100 MG capsule Take 1 capsule (100 mg total) by mouth 3 (three) times daily as needed for Cough. 20 capsule 3/20/2023 3/30/2023 Claudia Wilkinson PA-C    nirmatrelvir-ritonavir 300 mg (150 mg x 2)-100 mg copackaged tablets (EUA) Take 3 tablets by mouth 2 (two) times daily for 5 days. Each dose contains 2 nirmatrelvir (pink tablets) and 1 ritonavir (white tablet). Take all 3 tablets together 30 tablet 3/20/2023 3/25/2023 Claudia Wilkinson PA-C          Follow-up Information       Follow up With Specialties Details Why Contact Info    [facility] Schedule an appointment as soon as possible for a visit in 1 week  [address] [facility] [address] [phone]       [facility] Emergency Medicine Go to  If symptoms worsen [address]  [facility] [id number]  [phone]             Claudia Wilkinson PA-C  03/20/23 1529

## 2023-06-09 ENCOUNTER — PATIENT MESSAGE (OUTPATIENT)
Dept: RESEARCH | Facility: HOSPITAL | Age: 38
End: 2023-06-09
Payer: MEDICAID

## 2023-07-27 ENCOUNTER — PATIENT MESSAGE (OUTPATIENT)
Dept: RESEARCH | Facility: HOSPITAL | Age: 38
End: 2023-07-27
Payer: MEDICAID

## 2024-07-21 ENCOUNTER — HOSPITAL ENCOUNTER (EMERGENCY)
Facility: HOSPITAL | Age: 39
Discharge: HOME | End: 2024-07-21
Attending: STUDENT IN AN ORGANIZED HEALTH CARE EDUCATION/TRAINING PROGRAM
Payer: MEDICAID

## 2024-07-21 VITALS
BODY MASS INDEX: 35.1 KG/M2 | HEIGHT: 69 IN | TEMPERATURE: 98 F | OXYGEN SATURATION: 100 % | SYSTOLIC BLOOD PRESSURE: 131 MMHG | WEIGHT: 237 LBS | DIASTOLIC BLOOD PRESSURE: 87 MMHG | HEART RATE: 67 BPM | RESPIRATION RATE: 17 BRPM

## 2024-07-21 DIAGNOSIS — R11.10 EMESIS: ICD-10-CM

## 2024-07-21 DIAGNOSIS — R06.02 SOB (SHORTNESS OF BREATH): ICD-10-CM

## 2024-07-21 DIAGNOSIS — E86.0 DEHYDRATION: Primary | ICD-10-CM

## 2024-07-21 LAB
ALBUMIN SERPL BCP-MCNC: 4.2 G/DL (ref 3.5–5.2)
ALP SERPL-CCNC: 51 U/L (ref 55–135)
ALT SERPL W/O P-5'-P-CCNC: 15 U/L (ref 10–44)
ANION GAP SERPL CALC-SCNC: 11 MMOL/L (ref 8–16)
AST SERPL-CCNC: 16 U/L (ref 10–40)
BASOPHILS # BLD AUTO: 0.06 K/UL (ref 0–0.2)
BASOPHILS NFR BLD: 1.6 % (ref 0–1.9)
BILIRUB SERPL-MCNC: 1.3 MG/DL (ref 0.1–1)
BILIRUB UR QL STRIP: NEGATIVE
BNP SERPL-MCNC: <10 PG/ML (ref 0–99)
BUN SERPL-MCNC: 7 MG/DL (ref 6–20)
CALCIUM SERPL-MCNC: 9.4 MG/DL (ref 8.7–10.5)
CHLORIDE SERPL-SCNC: 112 MMOL/L (ref 95–110)
CLARITY UR REFRACT.AUTO: CLEAR
CO2 SERPL-SCNC: 16 MMOL/L (ref 23–29)
COLOR UR AUTO: YELLOW
CREAT SERPL-MCNC: 0.8 MG/DL (ref 0.5–1.4)
DIFFERENTIAL METHOD BLD: ABNORMAL
EOSINOPHIL # BLD AUTO: 0 K/UL (ref 0–0.5)
EOSINOPHIL NFR BLD: 0.8 % (ref 0–8)
ERYTHROCYTE [DISTWIDTH] IN BLOOD BY AUTOMATED COUNT: 13 % (ref 11.5–14.5)
EST. GFR  (NO RACE VARIABLE): >60 ML/MIN/1.73 M^2
GLUCOSE SERPL-MCNC: 83 MG/DL (ref 70–110)
GLUCOSE UR QL STRIP: NEGATIVE
HCT VFR BLD AUTO: 42.6 % (ref 37–48.5)
HGB BLD-MCNC: 14 G/DL (ref 12–16)
HGB UR QL STRIP: NEGATIVE
IMM GRANULOCYTES # BLD AUTO: 0 K/UL (ref 0–0.04)
IMM GRANULOCYTES NFR BLD AUTO: 0 % (ref 0–0.5)
KETONES UR QL STRIP: ABNORMAL
LEUKOCYTE ESTERASE UR QL STRIP: NEGATIVE
LIPASE SERPL-CCNC: 17 U/L (ref 4–60)
LYMPHOCYTES # BLD AUTO: 2.2 K/UL (ref 1–4.8)
LYMPHOCYTES NFR BLD: 56.9 % (ref 18–48)
MCH RBC QN AUTO: 25.6 PG (ref 27–31)
MCHC RBC AUTO-ENTMCNC: 32.9 G/DL (ref 32–36)
MCV RBC AUTO: 78 FL (ref 82–98)
MONOCYTES # BLD AUTO: 0.4 K/UL (ref 0.3–1)
MONOCYTES NFR BLD: 11.2 % (ref 4–15)
NEUTROPHILS # BLD AUTO: 1.1 K/UL (ref 1.8–7.7)
NEUTROPHILS NFR BLD: 29.5 % (ref 38–73)
NITRITE UR QL STRIP: NEGATIVE
NRBC BLD-RTO: 0 /100 WBC
PH UR STRIP: 6 [PH] (ref 5–8)
PLATELET # BLD AUTO: 274 K/UL (ref 150–450)
PMV BLD AUTO: 10.4 FL (ref 9.2–12.9)
POTASSIUM SERPL-SCNC: 3.5 MMOL/L (ref 3.5–5.1)
PROT SERPL-MCNC: 7.4 G/DL (ref 6–8.4)
PROT UR QL STRIP: ABNORMAL
RBC # BLD AUTO: 5.47 M/UL (ref 4–5.4)
SODIUM SERPL-SCNC: 139 MMOL/L (ref 136–145)
SP GR UR STRIP: 1.02 (ref 1–1.03)
TROPONIN I SERPL DL<=0.01 NG/ML-MCNC: <0.006 NG/ML (ref 0–0.03)
URN SPEC COLLECT METH UR: ABNORMAL
WBC # BLD AUTO: 3.83 K/UL (ref 3.9–12.7)

## 2024-07-21 PROCEDURE — 81003 URINALYSIS AUTO W/O SCOPE: CPT | Performed by: PHYSICIAN ASSISTANT

## 2024-07-21 PROCEDURE — 63600175 PHARM REV CODE 636 W HCPCS: Performed by: PHYSICIAN ASSISTANT

## 2024-07-21 PROCEDURE — 99285 EMERGENCY DEPT VISIT HI MDM: CPT | Mod: 25

## 2024-07-21 PROCEDURE — 93005 ELECTROCARDIOGRAM TRACING: CPT

## 2024-07-21 PROCEDURE — 83690 ASSAY OF LIPASE: CPT | Performed by: PHYSICIAN ASSISTANT

## 2024-07-21 PROCEDURE — 80053 COMPREHEN METABOLIC PANEL: CPT | Performed by: PHYSICIAN ASSISTANT

## 2024-07-21 PROCEDURE — 85025 COMPLETE CBC W/AUTO DIFF WBC: CPT | Performed by: PHYSICIAN ASSISTANT

## 2024-07-21 PROCEDURE — 93010 ELECTROCARDIOGRAM REPORT: CPT | Mod: ,,, | Performed by: INTERNAL MEDICINE

## 2024-07-21 PROCEDURE — 96361 HYDRATE IV INFUSION ADD-ON: CPT

## 2024-07-21 PROCEDURE — 83880 ASSAY OF NATRIURETIC PEPTIDE: CPT | Performed by: PHYSICIAN ASSISTANT

## 2024-07-21 PROCEDURE — 84484 ASSAY OF TROPONIN QUANT: CPT | Performed by: PHYSICIAN ASSISTANT

## 2024-07-21 PROCEDURE — 96360 HYDRATION IV INFUSION INIT: CPT

## 2024-07-21 RX ORDER — ONDANSETRON 4 MG/1
4 TABLET, ORALLY DISINTEGRATING ORAL EVERY 6 HOURS PRN
Qty: 12 TABLET | Refills: 0 | Status: SHIPPED | OUTPATIENT
Start: 2024-07-21

## 2024-07-21 RX ADMIN — SODIUM CHLORIDE, POTASSIUM CHLORIDE, SODIUM LACTATE AND CALCIUM CHLORIDE 1000 ML: 600; 310; 30; 20 INJECTION, SOLUTION INTRAVENOUS at 07:07

## 2024-07-21 RX ADMIN — SODIUM CHLORIDE, POTASSIUM CHLORIDE, SODIUM LACTATE AND CALCIUM CHLORIDE 1000 ML: 600; 310; 30; 20 INJECTION, SOLUTION INTRAVENOUS at 05:07

## 2024-07-21 NOTE — ED TRIAGE NOTES
Aniket Ac, a 39 y.o. female presents to the ED w/ complaint of N/V, SOB with exertion for a week or more, unable to hold anything down, painful urination today    Triage note:  Chief Complaint   Patient presents with    Multiple Complaints     SOB and nausea with emesis.      Review of patient's allergies indicates:   Allergen Reactions    Amoxicillin Hives    Codeine Hives    Hydrochlorothiazide      Other reaction(s): hypokalemia    Penicillins      Past Medical History:   Diagnosis Date    COVID-19 virus infection 12/11/2020    Essential hypertension 12/11/2020         APPEARANCE: awake and alert in NAD. PAIN  10/10  SKIN: warm, dry and intact. No breakdown or bruising.  MUSCULOSKELETAL: Patient moving all extremities spontaneously, no obvious swelling or deformities noted. Ambulates independently.  RESPIRATORY: Denies shortness of breath.Respirations unlabored.   CARDIAC: Denies CP, 2+ distal pulses; no peripheral edema  ABDOMEN: S/ND/NT, endorses nausea  : voids spontaneously, denies difficulty  Neurologic: AAO x 4; follows commands equal strength in all extremities; denies numbness/tingling. Denies dizziness

## 2024-07-21 NOTE — ED PROVIDER NOTES
Encounter Date: 2024       History     Chief Complaint   Patient presents with    Multiple Complaints     SOB and nausea with emesis.      Aniket Ac, a 39 y.o. female who presents to [facility] ED for emergent evaluation of multiple complaints.    About 1 week ago she has had symptoms fatigue, restlessness, nausea, vomiting without abdominal pain, muscle spasms, shortness of breath with exertion.  Noted dysuria earlier today but then it resolved.  States sometimes she has a hard time tolerating fluids.  She denies chest pain, cough, sore throat, fever.  Her last bowel movement was today and normal.  Has not had any diarrhea, melena, or blood in stool.  Has poor appetite and has not been able to eat or drink much.    She did start a weight loss medication Semaglutide/Wygovy 2 weeks ago.        Review of patient's allergies indicates:   Allergen Reactions    Amoxicillin Hives    Hydrochlorothiazide      Other reaction(s): hypokalemia    Penicillins      Past Medical History:   Diagnosis Date    COVID-19 virus infection 2020     Past Surgical History:   Procedure Laterality Date     SECTION, CLASSIC      CHOLECYSTECTOMY      COLONOSCOPY N/A 2022    Procedure: COLONOSCOPY;  Surgeon: Tila Samuels MD;  Location: [facility];  Service: Endoscopy;  Laterality: N/A;    TUBAL LIGATION       No family history on file.  Social History     Tobacco Use    Smoking status: Never    Smokeless tobacco: Never   Substance Use Topics    Alcohol use: Yes     Comment: SOCIALLY    Drug use: No     Review of Systems   Constitutional:  Positive for activity change, appetite change and fatigue. Negative for chills, diaphoresis and fever.   HENT:  Negative for sore throat.    Respiratory:  Negative for shortness of breath.    Cardiovascular:  Negative for chest pain.   Gastrointestinal:  Positive for nausea and vomiting. Negative for abdominal pain, constipation and diarrhea.   Genitourinary:  Positive for dysuria.  Negative for frequency and hematuria.   Musculoskeletal:  Positive for myalgias.   Skin:  Negative for rash.   Neurological:  Positive for weakness.   Hematological:  Does not bruise/bleed easily.       Physical Exam     Initial Vitals [07/21/24 1453]   BP Pulse Resp Temp SpO2   (!) 155/83 90 20 98.9 °F (37.2 °C) 100 %      MAP       --         Physical Exam    Vitals reviewed.  Constitutional: She appears well-developed and well-nourished. She is not diaphoretic. She is cooperative.  Non-toxic appearance. She does not have a sickly appearance. She does not appear ill. No distress.   HENT:   Head: Normocephalic and atraumatic.   Nose: Nose normal.   Mouth/Throat: No trismus in the jaw.   Eyes: Conjunctivae and EOM are normal.   Neck:   Normal range of motion.  Cardiovascular:  Normal rate and regular rhythm.           Pulmonary/Chest: Breath sounds normal. No accessory muscle usage. No tachypnea. No respiratory distress. She has no wheezes. She has no rhonchi. She has no rales.   Abdominal: Abdomen is soft. She exhibits no distension. There is no abdominal tenderness. There is no rebound and no guarding.   Musculoskeletal:         General: Normal range of motion.      Cervical back: Normal range of motion.      Right lower leg: No edema.      Left lower leg: No edema.     Neurological: She is alert. She has normal strength.   Skin: Skin is warm and dry. No erythema. No pallor.         ED Course   Procedures  Labs Reviewed   CBC W/ AUTO DIFFERENTIAL - Abnormal       Result Value    WBC 3.83 (*)     RBC 5.47 (*)     Hemoglobin 14.0      Hematocrit 42.6      MCV 78 (*)     MCH 25.6 (*)     MCHC 32.9      RDW 13.0      Platelets 274      MPV 10.4      Immature Granulocytes 0.0      Gran # (ANC) 1.1 (*)     Immature Grans (Abs) 0.00      Lymph # 2.2      Mono # 0.4      Eos # 0.0      Baso # 0.06      nRBC 0      Gran % 29.5 (*)     Lymph % 56.9 (*)     Mono % 11.2      Eosinophil % 0.8      Basophil % 1.6       Differential Method Automated      Narrative:     add on BNP per Margy García RN on  07/21/2024 @ 17:33.   COMPREHENSIVE METABOLIC PANEL - Abnormal    Sodium 139      Potassium 3.5      Chloride 112 (*)     CO2 16 (*)     Glucose 83      BUN 7      Creatinine 0.8      Calcium 9.4      Total Protein 7.4      Albumin 4.2      Total Bilirubin 1.3 (*)     Alkaline Phosphatase 51 (*)     AST 16      ALT 15      eGFR >60.0      Anion Gap 11      Narrative:     add on BNP per Margy García RN on  07/21/2024 @ 17:33.   URINALYSIS, REFLEX TO URINE CULTURE - Abnormal    Specimen UA Urine, Clean Catch      Color, UA Yellow      Appearance, UA Clear      pH, UA 6.0      Specific Gravity, UA 1.020      Protein, UA Trace (*)     Glucose, UA Negative      Ketones, UA 3+ (*)     Bilirubin (UA) Negative      Occult Blood UA Negative      Nitrite, UA Negative      Leukocytes, UA Negative      Narrative:     Specimen Source->Urine   LIPASE    Lipase 17      Narrative:     add on BNP per Margy García RN on  07/21/2024 @ 17:33.   TROPONIN I    Troponin I <0.006      Narrative:     add on BNP per Margy García RN on  07/21/2024 @ 17:33.   B-TYPE NATRIURETIC PEPTIDE   B-TYPE NATRIURETIC PEPTIDE    BNP <10      Narrative:     add on BNP per Margy García RN on  07/21/2024 @ 17:33.        ECG Results              EKG 12-lead (Final result)        Collection Time Result Time QRS Duration OHS QTC Calculation    07/21/24 14:56:39 07/22/24 11:21:01 82 416                     Final result by Interface, [facility] (07/22/24 11:21:05)                   Narrative:    Test Reason : R11.10,    Vent. Rate : 098 BPM     Atrial Rate : 098 BPM     P-R Int : 160 ms          QRS Dur : 082 ms      QT Int : 326 ms       P-R-T Axes : 081 084 049 degrees     QTc Int : 416 ms    Normal sinus rhythm with sinus arrhythmia  Possible Left atrial enlargement  Borderline Abnormal ECG  When compared with ECG of 20-MAR-2023  13:12,  No significant change was found  Confirmed by JESSICA GARCIA MD ([id number]) on 7/22/2024 11:20:57 AM    Referred By: [email or web address]   SELF           Confirmed By:JESSICA GARCIA MD                                  Imaging Results              X-Ray Chest PA And Lateral (Final result)  Result time 07/21/24 18:01:19      Final result by Alireza Barnes MD (07/21/24 18:01:19)                   Impression:      No acute process.      Electronically signed by: Alireza Barnes MD  Date:    07/21/2024  Time:    18:01               Narrative:    EXAMINATION:  XR CHEST PA AND LATERAL    CLINICAL HISTORY:  Shortness of breath    TECHNIQUE:  PA and lateral views of the chest were performed.    COMPARISON:  03/20/2023.    FINDINGS:  The trachea is unremarkable.  The cardiomediastinal silhouette is within normal limits.  The hilar structures are unremarkable.  There is no evidence of free air beneath the hemidiaphragms.  There are no pleural effusions.  There is no evidence of a pneumothorax.  There is no evidence of pneumomediastinum.  No airspace opacity is present.  The osseous structures are unremarkable.    There are postoperative changes in the right upper abdominal quadrant.                                       Medications   lactated ringers bolus 1,000 mL (0 mLs Intravenous Stopped 7/21/24 1919)   lactated ringers bolus 1,000 mL (0 mLs Intravenous Stopped 7/21/24 2037)     Medical Decision Making  Aniket Ac, a 39 y.o. female who presents to [facility] ED for emergent evaluation of multiple complaints.    DDx includes but is not limited to medication side effects, gastritis, gerd, pud, viral GE, other viral syndrome, dehydraiton, sharri, uti. No abd pain. Abd soft NTND. No fever. VSS. Nontoxic appearing. Doubt acute surgical abd and sepsis.     Will check labs and UA, hydrate given history of dehydration, and continue to monitor.    Amount and/or Complexity of Data Reviewed  Labs: ordered. Decision-making details documented in ED  Course.  Radiology: ordered. Decision-making details documented in ED Course.  ECG/medicine tests: ordered and independent interpretation performed.     Details: On my independent interpretation, ECG with NSR at 98 bpm. Normal intervals. No ST changes. No STEMI.    Risk  Prescription drug management.               ED Course as of 07/22/24 1815   Sun Jul 21, 2024   1619 BP(!): 155/83 [CL]   1619 Temp: 98.9 °F (37.2 °C) [CL]   1619 Pulse: 90 [CL]   1619 Resp: 20 [CL]   1620 SpO2: 100 % [CL]   1823 WBC(!): 3.83 [CL]   1824 Hemoglobin: 14.0 [CL]   1826 Sodium: 139 [CL]   1826 Potassium: 3.5 [CL]   1826 Chloride(!): 112 [CL]   1826 CO2(!): 16 [CL]   1826 Glucose: 83 [CL]   1826 BUN: 7 [CL]   1826 Creatinine: 0.8 [CL]   1826 BILIRUBIN TOTAL(!): 1.3 [CL]   1826 AST: 16 [CL]   1826 ALT: 15 [CL]   1826 Troponin I: <0.006 [CL]   1826 Lipase: 17 [CL]   1827 X-Ray Chest PA And Lateral  No acute process. [CL]   1855 Patient updated with results and chest x-ray.  We are waiting for BNP and urinalysis which are still in process. [CL]   1921 Leukocyte Esterase, UA: Negative [CL]   1921 NITRITE UA: Negative [CL]   1921 Blood, UA: Negative [CL]   1921 Ketones, UA(!): 3+ [CL]      ED Course User Index  [CL] Erika Rome PA-C          Patient had first dose of Wegovy (semaglutide) 2 weeks ago. Half life is 7 days. So likely still had drug in her when symptoms started. I suspect symptoms due to medical side effect. Her labs suggest dehydration from dec appetite, nausea, and vomiting. UA with ketones. No UTI or CE. She was PO challenged without issues. Reports feeling improved. We will continue to treat conservatively. Zofran as needed for nausea. Soft/fluid diet. Advance to solids as tolerated. I don't think she should take her Wegovy anymore. Follow up with PCP. Return to ER precautions given.                  Clinical Impression:  Final diagnoses:  [R11.10] Emesis  [R06.02] SOB (shortness of breath)  [E86.0] Dehydration (Primary)           ED Disposition Condition    Discharge Stable          ED Prescriptions       Medication Sig Dispense Start Date End Date Auth. Provider    ondansetron (ZOFRAN-ODT) 4 MG TbDL Take 1 tablet (4 mg total) by mouth every 6 (six) hours as needed (Nausea). 12 tablet 7/21/2024 -- Erika Rome PA-C          Follow-up Information       Follow up With Specialties Details Why Contact Info    [facility]  Schedule an appointment as soon as possible for a visit  [phone] [address] [facility] [address]    [facility]  Schedule an appointment as soon as possible for a visit  [phone] [address] [facility] [address]    [facility] - Emergency Dept Emergency Medicine  If symptoms worsen [address] [facility] [address] [phone]               Erika Rome PA-C  07/22/24 1220

## 2024-07-21 NOTE — Clinical Note
"Myiosha "Marlinmarcia" Osorio was seen and treated in our emergency department on 7/21/2024.  She may return to work on 07/23/2024.       If you have any questions or concerns, please don't hesitate to call.      Erika Rome PA-C"

## 2024-07-22 LAB
OHS QRS DURATION: 82 MS
OHS QTC CALCULATION: 416 MS

## 2024-07-22 NOTE — DISCHARGE INSTRUCTIONS
You have ketones in your urine which suggest dehydration.  You do not have signs of sepsis, anemia, significant electrolyte abnormalities, kidney injury, liver failure, or pancreatitis.  You do not have signs of heart failure or a heart attack.  Your chest x-ray is clear.    Take Zofran every 6 hours as needed for your nausea.  Start soft/fluid diet.  Drink 4-6 water bottles on a daily basis.  Consider electrolyte drinks every other day.    Follow up with PCP.  Return to the ER for new or worsening symptoms.  No future appointments.  Imaging Results              X-Ray Chest PA And Lateral (Final result)  Result time 07/21/24 18:01:19      Final result by Alireza Barnes MD (07/21/24 18:01:19)                   Impression:      No acute process.      Electronically signed by: Alireza Barnes MD  Date:    07/21/2024  Time:    18:01               Narrative:    EXAMINATION:  XR CHEST PA AND LATERAL    CLINICAL HISTORY:  Shortness of breath    TECHNIQUE:  PA and lateral views of the chest were performed.    COMPARISON:  03/20/2023.    FINDINGS:  The trachea is unremarkable.  The cardiomediastinal silhouette is within normal limits.  The hilar structures are unremarkable.  There is no evidence of free air beneath the hemidiaphragms.  There are no pleural effusions.  There is no evidence of a pneumothorax.  There is no evidence of pneumomediastinum.  No airspace opacity is present.  The osseous structures are unremarkable.    There are postoperative changes in the right upper abdominal quadrant.

## 2024-10-07 NOTE — PLAN OF CARE
CM called and spoke with patient in [id number] for DISCHARGE PLANNING ASSESSMENT.  Per patient's mother, she lives with spouse and dependent children in a single family home on a slab foundation with 0 steps to porch and point of entry.  Patient was independent with ADLS and DID NOT use DME or in-home assistive equipment.  She is not on dialysis or COUMADIN, takes medications as prescribed / keeps refilled / has resources for all daily and prescriptive needs.  Preferred pharmacy is [facility]-Agreeable to bedside delivery.  Will have help from her mother, Macrina, and other immediate family upon discharge. (Pt. will be staying with her mother for care after hospital discharge).  All questions addressed.  Unit and CM direct numbers provided.  Will continue to follow for course of hospitalization.    12/10/2020  9:47 PM    Hypoxia [R09.02]  Chest pain [R07.9]  COVID-19 virus infection [U07.1]  Acute respiratory disease due to COVID-19 virus [U07.1, J06.9]    Pharmacy: [facility]    Payor: MEDICAID / Plan: [facility] COMMUNITY PLAN [facility] ([place] MEDICAID) / Product Type: Managed Medicaid /     Tuyet Prasad RN CM  [id number]  Case Management       12/11/20 1039   Discharge Assessment   Assessment Type Discharge Planning Assessment   Confirmed/corrected address and phone number on facesheet? Yes   Assessment information obtained from? Other  (Macrina (mother))   Expected Length of Stay (days) 2   Communicated expected length of stay with patient/caregiver yes   Prior to hospitilization cognitive status: Alert/Oriented;No Deficits   Prior to hospitalization functional status: Independent   Current cognitive status: Alert/Oriented;No Deficits   Current Functional Status: Independent   Facility Arrived From: home   Lives With spouse;child(vita), dependent   Able to Return to Prior Arrangements other (see comments)  (mother states pt. will be staying with her after discharge for post hospitalization care)   Is  General Sunscreen Counseling: I recommended a broad spectrum sunscreen with a SPF of 30 or higher.  I explained that SPF 30 sunscreens block approximately 97 percent of the sun's harmful rays.  Sunscreens should be applied at least 15 minutes prior to expected sun exposure and then every 2 hours after that as long as sun exposure continues. If swimming or exercising sunscreen should be reapplied every 45 minutes to an hour after getting wet or sweating.  One ounce, or the equivalent of a shot glass full of sunscreen, is adequate to protect the skin not covered by a bathing suit. I also recommended a lip balm with a sunscreen as well. Sun protective clothing can be used in lieu of sunscreen but must be worn the entire time you are exposed to the sun's rays. patient able to care for self after discharge? Yes   Who are your caregiver(s) and their phone number(s)? Macrina (mother [phone])   Patient's perception of discharge disposition home or selfcare   Readmission Within the Last 30 Days no previous admission in last 30 days   Patient currently being followed by outpatient case management? No   Patient currently receives any other outside agency services? No   Equipment Currently Used at Home none   Do you have any problems affording any of your prescribed medications? No   Is the patient taking medications as prescribed? yes   Does the patient have transportation home? Yes  (mother Macrina)   Transportation Anticipated family or friend will provide   Dialysis Name and Scheduled days N/A   Does the patient receive services at the [facility]? No   Discharge Plan A Home   Discharge Plan B Home with family   DME Needed Upon Discharge  none   Patient/Family in Agreement with Plan yes         Detail Level: Detailed